# Patient Record
Sex: FEMALE | Race: WHITE | Employment: OTHER | URBAN - METROPOLITAN AREA
[De-identification: names, ages, dates, MRNs, and addresses within clinical notes are randomized per-mention and may not be internally consistent; named-entity substitution may affect disease eponyms.]

---

## 2017-08-17 ENCOUNTER — HOSPITAL ENCOUNTER (OUTPATIENT)
Dept: SURGERY | Age: 62
Discharge: HOME | End: 2017-08-17
Attending: INTERNAL MEDICINE | Admitting: INTERNAL MEDICINE

## 2017-08-17 VITALS
OXYGEN SATURATION: 96 % | WEIGHT: 180 LBS | SYSTOLIC BLOOD PRESSURE: 133 MMHG | TEMPERATURE: 98.2 F | DIASTOLIC BLOOD PRESSURE: 85 MMHG | RESPIRATION RATE: 16 BRPM | HEIGHT: 62 IN | BODY MASS INDEX: 33.13 KG/M2 | HEART RATE: 77 BPM

## 2017-08-17 DIAGNOSIS — Z12.11 ENCOUNTER FOR SCREENING FOR MALIGNANT NEOPLASM OF COLON: ICD-10-CM

## 2017-08-17 RX ORDER — LIDOCAINE HYDROCHLORIDE 10 MG/ML
0.1 INJECTION, SOLUTION EPIDURAL; INFILTRATION; INTRACAUDAL; PERINEURAL ONCE
Status: DISCONTINUED | OUTPATIENT
Start: 2017-08-17 | End: 2017-08-18 | Stop reason: HOSPADM

## 2017-08-17 RX ORDER — DULOXETIN HYDROCHLORIDE 30 MG/1
30 CAPSULE, DELAYED RELEASE ORAL DAILY
COMMUNITY
End: 2022-05-05 | Stop reason: SDUPTHER

## 2017-08-17 RX ORDER — SODIUM CHLORIDE, SODIUM LACTATE, POTASSIUM CHLORIDE, CALCIUM CHLORIDE 600; 310; 30; 20 MG/100ML; MG/100ML; MG/100ML; MG/100ML
INJECTION, SOLUTION INTRAVENOUS CONTINUOUS
Status: DISCONTINUED | OUTPATIENT
Start: 2017-08-17 | End: 2017-08-18 | Stop reason: HOSPADM

## 2017-08-17 RX ADMIN — SODIUM CHLORIDE, SODIUM LACTATE, POTASSIUM CHLORIDE, CALCIUM CHLORIDE: 600; 310; 30; 20 INJECTION, SOLUTION INTRAVENOUS at 07:17

## 2017-08-17 ASSESSMENT — PAIN SCALES - GENERAL
PAINLEVEL_OUTOF10: 0

## 2017-08-17 ASSESSMENT — PAIN - FUNCTIONAL ASSESSMENT: PAIN_FUNCTIONAL_ASSESSMENT: 0-10

## 2020-11-02 ENCOUNTER — HOSPITAL ENCOUNTER (OUTPATIENT)
Dept: MAMMOGRAPHY | Age: 65
Discharge: HOME | End: 2020-11-02
Payer: MEDICARE

## 2020-11-02 PROCEDURE — 77063 BREAST TOMOSYNTHESIS BI: CPT

## 2020-11-03 ENCOUNTER — TELEPHONE (OUTPATIENT)
Dept: MAMMOGRAPHY | Age: 65
End: 2020-11-03

## 2020-11-03 NOTE — TELEPHONE ENCOUNTER
Spoke with patient in regards to radiologist's recommendation for follow up to screening mammogram. tx to scheduling

## 2020-11-09 ENCOUNTER — HOSPITAL ENCOUNTER (OUTPATIENT)
Dept: MAMMOGRAPHY | Age: 65
Discharge: HOME | End: 2020-11-09
Payer: MEDICARE

## 2020-11-09 ENCOUNTER — HOSPITAL ENCOUNTER (OUTPATIENT)
Dept: ULTRASOUND IMAGING | Age: 65
Discharge: HOME | End: 2020-11-09
Payer: MEDICARE

## 2020-11-09 PROCEDURE — G0279 TOMOSYNTHESIS, MAMMO: HCPCS

## 2020-11-09 PROCEDURE — 76642 ULTRASOUND BREAST LIMITED: CPT

## 2021-07-15 ENCOUNTER — HOSPITAL ENCOUNTER (OUTPATIENT)
Dept: CT IMAGING | Age: 66
Discharge: HOME | End: 2021-07-15
Payer: MEDICARE

## 2021-07-15 DIAGNOSIS — M17.11 OSTEOARTHRITIS OF RIGHT KNEE, UNSPECIFIED OSTEOARTHRITIS TYPE: ICD-10-CM

## 2021-07-15 PROCEDURE — 73700 CT LOWER EXTREMITY W/O DYE: CPT

## 2021-07-27 ENCOUNTER — HOSPITAL ENCOUNTER (OUTPATIENT)
Age: 66
Discharge: HOME | End: 2021-07-27
Payer: MEDICARE

## 2021-07-27 LAB
ALBUMIN SERPL-MCNC: 4.5 G/DL (ref 3.4–5)
ANION GAP SERPL CALCULATED.3IONS-SCNC: 12 MMOL/L (ref 3–16)
APTT: 46.3 SEC (ref 26.2–38.6)
BASOPHILS ABSOLUTE: 0.1 K/UL (ref 0–0.2)
BASOPHILS RELATIVE PERCENT: 1.1 %
BILIRUBIN URINE: NEGATIVE
BLOOD, URINE: ABNORMAL
BUN BLDV-MCNC: 21 MG/DL (ref 7–20)
CALCIUM SERPL-MCNC: 9.2 MG/DL (ref 8.3–10.6)
CHLORIDE BLD-SCNC: 99 MMOL/L (ref 99–110)
CLARITY: CLEAR
CO2: 27 MMOL/L (ref 21–32)
COLOR: YELLOW
CREAT SERPL-MCNC: 0.7 MG/DL (ref 0.6–1.2)
EOSINOPHILS ABSOLUTE: 0.2 K/UL (ref 0–0.6)
EOSINOPHILS RELATIVE PERCENT: 2.6 %
EPITHELIAL CELLS, UA: NORMAL /HPF (ref 0–5)
GFR AFRICAN AMERICAN: >60
GFR NON-AFRICAN AMERICAN: >60
GLUCOSE BLD-MCNC: 94 MG/DL (ref 70–99)
GLUCOSE URINE: NEGATIVE MG/DL
HCT VFR BLD CALC: 40 % (ref 36–48)
HEMOGLOBIN: 13.3 G/DL (ref 12–16)
INR BLD: 1.03 (ref 0.88–1.12)
KETONES, URINE: NEGATIVE MG/DL
LEUKOCYTE ESTERASE, URINE: NEGATIVE
LYMPHOCYTES ABSOLUTE: 1.7 K/UL (ref 1–5.1)
LYMPHOCYTES RELATIVE PERCENT: 29.5 %
MCH RBC QN AUTO: 28.3 PG (ref 26–34)
MCHC RBC AUTO-ENTMCNC: 33.4 G/DL (ref 31–36)
MCV RBC AUTO: 84.8 FL (ref 80–100)
MICROSCOPIC EXAMINATION: YES
MONOCYTES ABSOLUTE: 0.5 K/UL (ref 0–1.3)
MONOCYTES RELATIVE PERCENT: 7.8 %
NEUTROPHILS ABSOLUTE: 3.4 K/UL (ref 1.7–7.7)
NEUTROPHILS RELATIVE PERCENT: 59 %
NITRITE, URINE: NEGATIVE
PDW BLD-RTO: 14.3 % (ref 12.4–15.4)
PH UA: 6.5 (ref 5–8)
PLATELET # BLD: 219 K/UL (ref 135–450)
PMV BLD AUTO: 8.5 FL (ref 5–10.5)
POTASSIUM SERPL-SCNC: 4 MMOL/L (ref 3.5–5.1)
PROTEIN UA: NEGATIVE MG/DL
PROTHROMBIN TIME: 11.7 SEC (ref 9.9–12.7)
RBC # BLD: 4.71 M/UL (ref 4–5.2)
RBC UA: NORMAL /HPF (ref 0–4)
SODIUM BLD-SCNC: 138 MMOL/L (ref 136–145)
SPECIFIC GRAVITY UA: 1.02 (ref 1–1.03)
TRANSFERRIN: 228 MG/DL (ref 200–360)
URINE TYPE: ABNORMAL
UROBILINOGEN, URINE: 0.2 E.U./DL
WBC # BLD: 5.8 K/UL (ref 4–11)
WBC UA: NORMAL /HPF (ref 0–5)

## 2021-07-27 PROCEDURE — 83036 HEMOGLOBIN GLYCOSYLATED A1C: CPT

## 2021-07-27 PROCEDURE — 80048 BASIC METABOLIC PNL TOTAL CA: CPT

## 2021-07-27 PROCEDURE — 81001 URINALYSIS AUTO W/SCOPE: CPT

## 2021-07-27 PROCEDURE — 85730 THROMBOPLASTIN TIME PARTIAL: CPT

## 2021-07-27 PROCEDURE — 36415 COLL VENOUS BLD VENIPUNCTURE: CPT

## 2021-07-27 PROCEDURE — 85610 PROTHROMBIN TIME: CPT

## 2021-07-27 PROCEDURE — 87086 URINE CULTURE/COLONY COUNT: CPT

## 2021-07-27 PROCEDURE — 82040 ASSAY OF SERUM ALBUMIN: CPT

## 2021-07-27 PROCEDURE — 84466 ASSAY OF TRANSFERRIN: CPT

## 2021-07-27 PROCEDURE — 85025 COMPLETE CBC W/AUTO DIFF WBC: CPT

## 2021-07-28 LAB
ESTIMATED AVERAGE GLUCOSE: 125.5 MG/DL
HBA1C MFR BLD: 6 %
URINE CULTURE, ROUTINE: NORMAL

## 2021-10-15 ENCOUNTER — CLINICAL DOCUMENTATION (OUTPATIENT)
Dept: OTHER | Age: 66
End: 2021-10-15

## 2021-12-16 ENCOUNTER — TELEPHONE (OUTPATIENT)
Dept: ORTHOPEDIC SURGERY | Age: 66
End: 2021-12-16

## 2021-12-16 NOTE — TELEPHONE ENCOUNTER
LVM for patient regarding the [address] [place] [address] Orthopedic joint pain program. Patient can call [phone] for more information or to schedule an appointment with a joint pain specialist.

## 2022-04-01 LAB
ALBUMIN SERPL-MCNC: 4.3 G/DL
ALP BLD-CCNC: 93 U/L
ALT SERPL-CCNC: 35 U/L
ANION GAP SERPL CALCULATED.3IONS-SCNC: NORMAL MMOL/L
AST SERPL-CCNC: 24 U/L
AVERAGE GLUCOSE: 131
BASOPHILS ABSOLUTE: 0.1 /ΜL
BASOPHILS RELATIVE PERCENT: 1 %
BILIRUB SERPL-MCNC: 0.3 MG/DL (ref 0.1–1.4)
BUN BLDV-MCNC: 16 MG/DL
CALCIUM SERPL-MCNC: 9.3 MG/DL
CHLORIDE BLD-SCNC: 99 MMOL/L
CHOLESTEROL, TOTAL: 164 MG/DL
CHOLESTEROL/HDL RATIO: NORMAL
CO2: 23 MMOL/L
CREAT SERPL-MCNC: 0.69 MG/DL
EOSINOPHILS ABSOLUTE: 0.1 /ΜL
EOSINOPHILS RELATIVE PERCENT: 2 %
GFR CALCULATED: 94
GLUCOSE BLD-MCNC: 99 MG/DL
HBA1C MFR BLD: 6.2 %
HCT VFR BLD CALC: 42.4 % (ref 36–46)
HDLC SERPL-MCNC: 55 MG/DL (ref 35–70)
HEMOGLOBIN: 13.9 G/DL (ref 12–16)
LDL CHOLESTEROL CALCULATED: 90 MG/DL (ref 0–160)
LYMPHOCYTES ABSOLUTE: 1.6 /ΜL
LYMPHOCYTES RELATIVE PERCENT: 27 %
MCH RBC QN AUTO: 27.9 PG
MCHC RBC AUTO-ENTMCNC: 32.8 G/DL
MCV RBC AUTO: 85 FL
MONOCYTES ABSOLUTE: 0.4 /ΜL
MONOCYTES RELATIVE PERCENT: 7 %
NEUTROPHILS ABSOLUTE: 3.6 /ΜL
NEUTROPHILS RELATIVE PERCENT: 63 %
NONHDLC SERPL-MCNC: NORMAL MG/DL
PLATELET # BLD: 234 K/ΜL
PMV BLD AUTO: NORMAL FL
POTASSIUM SERPL-SCNC: 4.3 MMOL/L
RBC # BLD: 4.98 10^6/ΜL
SODIUM BLD-SCNC: 139 MMOL/L
TOTAL PROTEIN: 7
TRIGL SERPL-MCNC: 106 MG/DL
TSH SERPL DL<=0.05 MIU/L-ACNC: 2.9 UIU/ML
VLDLC SERPL CALC-MCNC: 19 MG/DL
WBC # BLD: 5.8 10^3/ML

## 2022-05-05 ENCOUNTER — OFFICE VISIT (OUTPATIENT)
Dept: FAMILY MEDICINE CLINIC | Age: 67
End: 2022-05-05
Payer: MEDICARE

## 2022-05-05 VITALS
BODY MASS INDEX: 39.93 KG/M2 | HEART RATE: 115 BPM | DIASTOLIC BLOOD PRESSURE: 82 MMHG | SYSTOLIC BLOOD PRESSURE: 126 MMHG | OXYGEN SATURATION: 97 % | HEIGHT: 62 IN | TEMPERATURE: 98 F | WEIGHT: 217 LBS

## 2022-05-05 DIAGNOSIS — M79.7 FIBROMYALGIA: ICD-10-CM

## 2022-05-05 DIAGNOSIS — Z78.0 POSTMENOPAUSAL: ICD-10-CM

## 2022-05-05 DIAGNOSIS — N81.10 BLADDER PROLAPSE, FEMALE, ACQUIRED: ICD-10-CM

## 2022-05-05 DIAGNOSIS — I10 ESSENTIAL HYPERTENSION: ICD-10-CM

## 2022-05-05 DIAGNOSIS — R22.43 LOCALIZED SWELLING OF BOTH LOWER LEGS: ICD-10-CM

## 2022-05-05 DIAGNOSIS — Z53.20 SCREENING MAMMOGRAPHY DECLINED: ICD-10-CM

## 2022-05-05 DIAGNOSIS — F33.1 MODERATE EPISODE OF RECURRENT MAJOR DEPRESSIVE DISORDER (HCC): ICD-10-CM

## 2022-05-05 DIAGNOSIS — Z76.89 ENCOUNTER TO ESTABLISH CARE: Primary | ICD-10-CM

## 2022-05-05 LAB
ALBUMIN SERPL-MCNC: 4.7 G/DL (ref 3.4–5)
ANION GAP SERPL CALCULATED.3IONS-SCNC: 18 MMOL/L (ref 3–16)
BUN BLDV-MCNC: 18 MG/DL (ref 7–20)
CALCIUM SERPL-MCNC: 9.7 MG/DL (ref 8.3–10.6)
CHLORIDE BLD-SCNC: 101 MMOL/L (ref 99–110)
CO2: 24 MMOL/L (ref 21–32)
CREAT SERPL-MCNC: 0.8 MG/DL (ref 0.6–1.2)
GFR AFRICAN AMERICAN: >60
GFR NON-AFRICAN AMERICAN: >60
GLUCOSE BLD-MCNC: 93 MG/DL (ref 70–99)
PHOSPHORUS: 3.2 MG/DL (ref 2.5–4.9)
POTASSIUM SERPL-SCNC: 4.2 MMOL/L (ref 3.5–5.1)
SODIUM BLD-SCNC: 143 MMOL/L (ref 136–145)

## 2022-05-05 PROCEDURE — 1036F TOBACCO NON-USER: CPT | Performed by: PHYSICIAN ASSISTANT

## 2022-05-05 PROCEDURE — G8400 PT W/DXA NO RESULTS DOC: HCPCS | Performed by: PHYSICIAN ASSISTANT

## 2022-05-05 PROCEDURE — 1123F ACP DISCUSS/DSCN MKR DOCD: CPT | Performed by: PHYSICIAN ASSISTANT

## 2022-05-05 PROCEDURE — G8417 CALC BMI ABV UP PARAM F/U: HCPCS | Performed by: PHYSICIAN ASSISTANT

## 2022-05-05 PROCEDURE — 99214 OFFICE O/P EST MOD 30 MIN: CPT | Performed by: PHYSICIAN ASSISTANT

## 2022-05-05 PROCEDURE — 3017F COLORECTAL CA SCREEN DOC REV: CPT | Performed by: PHYSICIAN ASSISTANT

## 2022-05-05 PROCEDURE — 4040F PNEUMOC VAC/ADMIN/RCVD: CPT | Performed by: PHYSICIAN ASSISTANT

## 2022-05-05 PROCEDURE — G8427 DOCREV CUR MEDS BY ELIG CLIN: HCPCS | Performed by: PHYSICIAN ASSISTANT

## 2022-05-05 PROCEDURE — 1090F PRES/ABSN URINE INCON ASSESS: CPT | Performed by: PHYSICIAN ASSISTANT

## 2022-05-05 RX ORDER — FUROSEMIDE 40 MG/1
TABLET ORAL
COMMUNITY
Start: 2022-04-28 | End: 2022-05-20 | Stop reason: SDUPTHER

## 2022-05-05 RX ORDER — DULOXETIN HYDROCHLORIDE 60 MG/1
60 CAPSULE, DELAYED RELEASE ORAL DAILY
Qty: 90 CAPSULE | Refills: 1 | Status: SHIPPED | OUTPATIENT
Start: 2022-05-05

## 2022-05-05 RX ORDER — DULOXETIN HYDROCHLORIDE 30 MG/1
30 CAPSULE, DELAYED RELEASE ORAL DAILY
Qty: 7 CAPSULE | Refills: 0 | Status: SHIPPED | OUTPATIENT
Start: 2022-05-05 | End: 2022-05-20

## 2022-05-05 SDOH — ECONOMIC STABILITY: TRANSPORTATION INSECURITY
IN THE PAST 12 MONTHS, HAS THE LACK OF TRANSPORTATION KEPT YOU FROM MEDICAL APPOINTMENTS OR FROM GETTING MEDICATIONS?: NO

## 2022-05-05 SDOH — ECONOMIC STABILITY: TRANSPORTATION INSECURITY
IN THE PAST 12 MONTHS, HAS LACK OF TRANSPORTATION KEPT YOU FROM MEETINGS, WORK, OR FROM GETTING THINGS NEEDED FOR DAILY LIVING?: NO

## 2022-05-05 SDOH — ECONOMIC STABILITY: HOUSING INSECURITY: IN THE LAST 12 MONTHS, HOW MANY PLACES HAVE YOU LIVED?: 1

## 2022-05-05 SDOH — ECONOMIC STABILITY: INCOME INSECURITY: IN THE LAST 12 MONTHS, WAS THERE A TIME WHEN YOU WERE NOT ABLE TO PAY THE MORTGAGE OR RENT ON TIME?: NO

## 2022-05-05 SDOH — ECONOMIC STABILITY: FOOD INSECURITY: WITHIN THE PAST 12 MONTHS, YOU WORRIED THAT YOUR FOOD WOULD RUN OUT BEFORE YOU GOT MONEY TO BUY MORE.: NEVER TRUE

## 2022-05-05 SDOH — ECONOMIC STABILITY: HOUSING INSECURITY
IN THE LAST 12 MONTHS, WAS THERE A TIME WHEN YOU DID NOT HAVE A STEADY PLACE TO SLEEP OR SLEPT IN A SHELTER (INCLUDING NOW)?: NO

## 2022-05-05 SDOH — ECONOMIC STABILITY: FOOD INSECURITY: WITHIN THE PAST 12 MONTHS, THE FOOD YOU BOUGHT JUST DIDN'T LAST AND YOU DIDN'T HAVE MONEY TO GET MORE.: NEVER TRUE

## 2022-05-05 ASSESSMENT — PATIENT HEALTH QUESTIONNAIRE - PHQ9
SUM OF ALL RESPONSES TO PHQ QUESTIONS 1-9: 0
2. FEELING DOWN, DEPRESSED OR HOPELESS: 0
SUM OF ALL RESPONSES TO PHQ QUESTIONS 1-9: 0
SUM OF ALL RESPONSES TO PHQ9 QUESTIONS 1 & 2: 0
1. LITTLE INTEREST OR PLEASURE IN DOING THINGS: 0

## 2022-05-05 ASSESSMENT — ENCOUNTER SYMPTOMS
CONSTIPATION: 0
NAUSEA: 0
SORE THROAT: 0
ABDOMINAL PAIN: 1
SHORTNESS OF BREATH: 0
COUGH: 0
DIARRHEA: 0
RHINORRHEA: 0
VOMITING: 0

## 2022-05-05 ASSESSMENT — SOCIAL DETERMINANTS OF HEALTH (SDOH): HOW HARD IS IT FOR YOU TO PAY FOR THE VERY BASICS LIKE FOOD, HOUSING, MEDICAL CARE, AND HEATING?: HARD

## 2022-05-05 NOTE — PROGRESS NOTES
2022  Agustin Conrad (: 1955)  74 y.o. HPI     Patient presents to establish care- switching pcp for personal reasons. Fibromyalgia- previously on cymbalta for \"whole body pain. \" was told in the past it was fibro. Stopped the medication a few months ago and has noticed gradual worsening of body aches and pains. Mostly in arms torso, and legs. Palpitations: stable on metoprolol. Reports she has an upcoming appointment with cardiology for ongoing chest pain. BP is normal in office today. Denies ha, soa, orthopnea and shen. Anxiety and depression: previously on cymbalta, felt more even keeled. Worsening sleep over the last year, difficulty falling and staying asleep. Currently takes lasix for swelling in the lower extremities. Takes 40 mg daily - has been on this medication 2 months- has not had repeat blood work since starting. Follows with Dr. Jadyn Cerna for chronic abdominal pain and GERD. Currently  on esomeprazole- not working as well as previously. Some increased abdominal pain from baseline. Denies odynophagia and dysphagia. Has prolapsed bladder requesting referral for repair. Has had associated incontinence. Due for mammo- declines states \"I will never do that again. \" she is aware of risks associated with foregoing screening including undiagnosed breast cancer. Review of Systems   Constitutional: Negative for activity change, chills and fever. HENT: Negative for congestion, ear pain, rhinorrhea and sore throat. Eyes: Negative for visual disturbance. Respiratory: Negative for cough and shortness of breath. Cardiovascular: Positive for chest pain. Negative for palpitations. Gastrointestinal: Positive for abdominal pain. Negative for constipation, diarrhea, nausea and vomiting. Genitourinary: Positive for pelvic pain. Negative for difficulty urinating and dysuria. Bladder prolapse   Musculoskeletal: Positive for arthralgias and myalgias.    Skin: Negative for rash. Neurological: Positive for numbness (bilateral feet). Negative for dizziness and weakness. Psychiatric/Behavioral: Negative for sleep disturbance. Past Medical History:   Diagnosis Date    Colitis     Diverticulosis     MVP (mitral valve prolapse)     Spastic colon      Past Surgical History:   Procedure Laterality Date    APPENDECTOMY      BREAST ENHANCEMENT SURGERY  1973    x3- all breast tissue removed then implants    CARPAL TUNNEL RELEASE      COLONOSCOPY  08/17/2017    polyps    HYSTERECTOMY      INNER EAR SURGERY      JOINT REPLACEMENT Right     Knee    TONSILLECTOMY      UPPER GASTROINTESTINAL ENDOSCOPY  08/17/2017     Family History   Problem Relation Age of Onset    Heart Disease Mother     High Blood Pressure Mother     Heart Disease Father     High Blood Pressure Father     Cancer Sister     Heart Disease Maternal Grandfather      Social History     Socioeconomic History    Marital status: Single     Spouse name: Not on file    Number of children: Not on file    Years of education: Not on file    Highest education level: Not on file   Occupational History    Not on file   Tobacco Use    Smoking status: Former Smoker    Smokeless tobacco: Never Used   Vaping Use    Vaping Use: Some days   Substance and Sexual Activity    Alcohol use: No    Drug use: No    Sexual activity: Never   Other Topics Concern    Not on file   Social History Narrative    Not on file     Social Determinants of Health     Financial Resource Strain: High Risk    Difficulty of Paying Living Expenses: Hard   Food Insecurity: No Food Insecurity    Worried About Running Out of Food in the Last Year: Never true    [place] of Food in the Last Year: Never true   Transportation Needs: No Transportation Needs    Lack of Transportation (Medical): No    Lack of Transportation (Non-Medical):  No   Physical Activity:     Days of Exercise per Week: Not on file    Minutes of Exercise per Session: Not on file   Stress:     Feeling of Stress : Not on file   Social Connections:     Frequency of Communication with Friends and Family: Not on file    Frequency of Social Gatherings with Friends and Family: Not on file    Attends Latter day Services: Not on file    Active Member of [address] [place] [address] or Organizations: Not on file    Attends Club or Organization Meetings: Not on file    Marital Status: Not on file   Intimate Partner Violence:     Fear of Current or Ex-Partner: Not on file    Emotionally Abused: Not on file    Physically Abused: Not on file    Sexually Abused: Not on file   Housing Stability: [address] Unable to Pay for Housing in the Last Year: No    Number of [place] in the Last Year: 1    Unstable Housing in the Last Year: No     Allergies   Allergen Reactions    Sertraline Other (See Comments)     Suicidal ideation requiring hospitalization    Sulfa Antibiotics Nausea Only and Other (See Comments)     dizziness       Current Outpatient Medications   Medication Sig Dispense Refill    furosemide (LASIX) 40 MG tablet TAKE 1 TABLET BY MOUTH ONCE DAILY      DULoxetine (CYMBALTA) 30 MG extended release capsule Take 1 capsule by mouth daily 7 capsule 0    DULoxetine (CYMBALTA) 60 MG extended release capsule Take 1 capsule by mouth daily 90 capsule 1    Esomeprazole Magnesium (NEXIUM PO) Take by mouth daily as needed       No current facility-administered medications for this visit. Vitals:    05/05/22 0937   BP: 126/82   Site: Right Upper Arm   Position: Sitting   Cuff Size: Large Adult   Pulse: 115   Temp: 98 °F (36.7 °C)   TempSrc: Oral   SpO2: 97%   Weight: 217 lb (98.4 kg)   Height: 5' 1.5\" (1.562 m)     Estimated body mass index is 40.34 kg/m² as calculated from the following:    Height as of this encounter: 5' 1.5\" (1.562 m). Weight as of this encounter: 217 lb (98.4 kg). Physical Exam  Constitutional:       General: She is not in acute distress. Appearance: She is well-developed. HENT:      Head: Normocephalic and atraumatic. Eyes:      Conjunctiva/sclera: Conjunctivae normal.      Pupils: Pupils are equal, round, and reactive to light. Cardiovascular:      Rate and Rhythm: Normal rate and regular rhythm. Heart sounds: Normal heart sounds. No murmur heard. Pulmonary:      Effort: Pulmonary effort is normal.      Breath sounds: Normal breath sounds. No wheezing. Abdominal:      General: Bowel sounds are normal.      Palpations: Abdomen is soft. Tenderness: There is no abdominal tenderness. Musculoskeletal:      Cervical back: Neck supple. Lymphadenopathy:      Cervical: No cervical adenopathy. Skin:     General: Skin is warm and dry. Findings: No rash. Neurological:      Mental Status: She is alert and oriented to person, place, and time. ASSESSMENT and PLAN:  Army Mckeon was seen today for new patient. Diagnoses and all orders for this visit:    Encounter to establish care  - declines mammo  - utd on colonoscopy   - agreeable to dexa   - reports just had labs done, will request.     Essential hypertension  - well controlled on current regimen, continue     Fibromyalgia  -     DULoxetine (CYMBALTA) 30 MG extended release capsule; Take 1 capsule by mouth daily  -     DULoxetine (CYMBALTA) 60 MG extended release capsule; Take 1 capsule by mouth daily  - restart cymbalta, previously maintained on 60 mg.     Bladder prolapse, female, acquired  -     External Referral To Gynecology    Localized swelling of both lower legs  -     Renal Function Panel; Future  - on lasix- due for renal function recheck. Not on K supplement     Postmenopausal  -     DEXA BONE DENSITY AXIAL SKELETON; Future    Screening mammography declined  - aware of risks associated with this decisions     Moderate episode of recurrent major depressive disorder (HCC)  -     DULoxetine (CYMBALTA) 30 MG extended release capsule;  Take 1 capsule by mouth daily  -     DULoxetine (CYMBALTA) 60 MG extended release capsule; Take 1 capsule by mouth daily      Return in about 4 weeks (around 6/2/2022) for Anxiety, Depression.

## 2022-05-19 NOTE — PROGRESS NOTES
that she does not drink alcohol and does not use drugs. She lives in [facility], she is not . She had 1 daughter. Quit smoking in . Smoked 1 pack a day. Denies alcohol and drugs    Family History:  family history includes Cancer in her sister; Heart Disease in her father, maternal grandfather, and mother; High Blood Pressure in her father and mother. dad  at 80 of heart attack. Mom had heart problem that went undiagnosed and  from that. Home Medications:  Prior to Admission medications    Medication Sig Start Date End Date Taking? Authorizing Provider   furosemide (LASIX) 40 MG tablet TAKE 1 TABLET BY MOUTH ONCE DAILY 22  Yes Historical Provider, MD   DULoxetine (CYMBALTA) 60 MG extended release capsule Take 1 capsule by mouth daily 22  Yes RENARD Lackey   Esomeprazole Magnesium (NEXIUM PO) Take 20 mg by mouth daily    Yes Historical Provider, MD   DULoxetine (CYMBALTA) 30 MG extended release capsule Take 1 capsule by mouth daily  Patient not taking: Reported on 2022   RENARD Lackey        Allergies:  Sertraline and Sulfa antibiotics     Review of Systems:   · Constitutional: there has been no unanticipated weight loss. There's been no change in energy level, sleep pattern, or activity level. · Eyes: No visual changes or diplopia. No scleral icterus. · ENT: No Headaches, hearing loss or vertigo. No mouth sores or sore throat. · Cardiovascular: Reviewed in HPI  · Respiratory: No cough or wheezing, no sputum production. No hematemesis. · Gastrointestinal: No abdominal pain, appetite loss, blood in stools. No change in bowel or bladder habits. · Genitourinary: No dysuria, trouble voiding, or hematuria. · Musculoskeletal:  No gait disturbance, weakness or joint complaints. · Integumentary: No rash or pruritis. · Neurological: No headache, diplopia, change in muscle strength, numbness or tingling.  No change in gait, balance, coordination, mood, affect, memory, mentation, behavior. · Psychiatric: No anxiety, no depression. · Endocrine: No malaise, fatigue or temperature intolerance. No excessive thirst, fluid intake, or urination. No tremor. · Hematologic/Lymphatic: No abnormal bruising or bleeding, blood clots or swollen lymph nodes. · Allergic/Immunologic: No nasal congestion or hives. Physical Examination:    Vitals:    05/20/22 1319   BP: 124/80   Pulse: 104   Temp: 98.3 °F (36.8 °C)   SpO2: 94%        Constitutional and General Appearance: NAD   Respiratory:  · Normal excursion and expansion without use of accessory muscles  · Resp Auscultation: Clear, no crackles or wheezes   Cardiovascular:  · The apical impulses not displaced  · Heart tones are crisp and normal  · Cervical veins are not engorged  · The carotid upstroke is normal in amplitude and contour without delay or bruit  · Normal S1S2, No S3, No Murmur  · Peripheral pulses are symmetrical and full  · There is no clubbing, cyanosis of the extremities. · Trace - 1+ BLE edema  · Femoral Arteries: 2+ and equal  · Pedal Pulses: 2+ and equal   Abdomen:  · No masses or tenderness  · Liver/Spleen: No Abnormalities Noted  Neurological/Psychiatric:  · Alert and oriented in all spheres  · Moves all extremities well  · Exhibits normal gait balance and coordination  · No abnormalities of mood, affect, memory, mentation, or behavior are noted  Skin:  · Skin: warm and dry.     Lab Results   Component Value Date    CHOL 164 04/01/2022    CHOL 183 08/23/2016     Lab Results   Component Value Date    TRIG 106 04/01/2022    TRIG 120 08/23/2016     Lab Results   Component Value Date    HDL 55 04/01/2022    HDL 63 (H) 08/23/2016     Lab Results   Component Value Date    LDLCALC 90 04/01/2022    LDLCALC 96 08/23/2016     Lab Results   Component Value Date    LABVLDL 24 08/23/2016    VLDL 19 04/01/2022     No results found for: FAIZAN    I have personally reviewed all labs including lipids 4/1/22    Assessment:     1. Essential hypertension: Well controlled and will continue current medical regimen. 2. Abnormal EKG:  Most recent EKG 3/31/22 SR 97 bpm; NST change. I do not see concerning findings on this study. 3. SOB (shortness of breath) on exertion: Need concern for CHF +/- cardiomyopathy vs underlying lung dz vs MIRIAN vs obesity/deconditioning vs combination. Will start with ECHO to evaluate EF and pro-BNP level. Need referral to pulm doc for possible MIRIAN and obstructive lung disease due to tob abuse history. 4. LE edema: See #3 above. Possible CHF vs venous insuffiency. Improved with lasix and will continue 40mg daily. 5. Tachycardia: Mildly tachycardic on exam today. Will perform SYED to evaluate avg HR and rhythm. ECHO to see if any evidence for cardiomyopathy. Consider beta blocker based on testing results. Plan:  1. Your heart rate is 104 today. I call that mild tachycardia (faster HR). 2. Your EKG looks normal to me. 3. I would like for you to wear a monitor for 48 hours so we can see what is happening with your heart.   -if your heart rate is steady around 100 bpm then I won't need to start a medication   -if your heart rate is consistently above 110 bpm, then I can start you on a beta blocker medication to help control your heart rate  4. Call to schedule an Echocardiogram to look at heart size and strength   - This test is an ultrasound of your heart just like when you see an ultrasound that a woman has when she is pregnant.  -The test records the movement of your heart valves and chambers.  -It evaluates heart valves, chamber enlargement, abnormal openings, or any fluid in the sac surrounding the heart. 5. I will personally review your tests and then I will have my staff call you with the results. 6. Continue taking your lasix as prescribed   7. I will personally review your tests and then I will have my staff call you with the results.    8. I would like for you to see a lung doctor to evaluate you for sleep apnea and lung function. Follow up with me in 3 months    This note is scribed in the presence of Dr. Juan Gutierrez. Cheryl Duffy by Maty Lema RN.    I, Dr. Greg Fay, personally performed the services described in this documentation, as scribed by the above signed scribe in my presence. It is both accurate and complete to my knowledge. I agree with the details independently gathered by the clinical support staff, while the remaining scribed note accurately describes my personal service to the patient. Cost of prescription medications and patient compliance have been reviewed with patient. All questions answered. Thank you for allowing me to participate in the care of this individual.    Juan Gutierrez.  Cheryl Duffy M.D., [facility]

## 2022-05-20 ENCOUNTER — OFFICE VISIT (OUTPATIENT)
Dept: CARDIOLOGY CLINIC | Age: 67
End: 2022-05-20
Payer: MEDICARE

## 2022-05-20 ENCOUNTER — TELEPHONE (OUTPATIENT)
Dept: CARDIOLOGY CLINIC | Age: 67
End: 2022-05-20

## 2022-05-20 ENCOUNTER — HOSPITAL ENCOUNTER (OUTPATIENT)
Age: 67
Discharge: HOME | End: 2022-05-20
Payer: MEDICARE

## 2022-05-20 VITALS
DIASTOLIC BLOOD PRESSURE: 80 MMHG | HEART RATE: 104 BPM | BODY MASS INDEX: 39.56 KG/M2 | HEIGHT: 62 IN | WEIGHT: 215 LBS | OXYGEN SATURATION: 94 % | SYSTOLIC BLOOD PRESSURE: 124 MMHG | TEMPERATURE: 98.3 F

## 2022-05-20 DIAGNOSIS — R60.0 LOCALIZED EDEMA: ICD-10-CM

## 2022-05-20 DIAGNOSIS — Z87.891 HISTORY OF TOBACCO ABUSE: ICD-10-CM

## 2022-05-20 DIAGNOSIS — R06.02 SOB (SHORTNESS OF BREATH) ON EXERTION: ICD-10-CM

## 2022-05-20 DIAGNOSIS — R94.31 ABNORMAL EKG: ICD-10-CM

## 2022-05-20 DIAGNOSIS — R00.0 TACHYCARDIA: ICD-10-CM

## 2022-05-20 DIAGNOSIS — I10 ESSENTIAL HYPERTENSION: Primary | ICD-10-CM

## 2022-05-20 PROCEDURE — 83880 ASSAY OF NATRIURETIC PEPTIDE: CPT

## 2022-05-20 PROCEDURE — 99204 OFFICE O/P NEW MOD 45 MIN: CPT | Performed by: INTERNAL MEDICINE

## 2022-05-20 PROCEDURE — 36415 COLL VENOUS BLD VENIPUNCTURE: CPT

## 2022-05-20 PROCEDURE — 93225 XTRNL ECG REC<48 HRS REC: CPT | Performed by: INTERNAL MEDICINE

## 2022-05-20 PROCEDURE — G8427 DOCREV CUR MEDS BY ELIG CLIN: HCPCS | Performed by: INTERNAL MEDICINE

## 2022-05-20 PROCEDURE — G8417 CALC BMI ABV UP PARAM F/U: HCPCS | Performed by: INTERNAL MEDICINE

## 2022-05-20 PROCEDURE — 1090F PRES/ABSN URINE INCON ASSESS: CPT | Performed by: INTERNAL MEDICINE

## 2022-05-20 RX ORDER — FUROSEMIDE 40 MG/1
TABLET ORAL
Qty: 90 TABLET | Refills: 3 | Status: SHIPPED | OUTPATIENT
Start: 2022-05-20

## 2022-05-20 NOTE — PATIENT INSTRUCTIONS
Plan:  1. Your heart rate is 104 today. I call that tachycardia  2. Your EKG looks normal to me. 3. I would like for you to wear a monitor for 48 hours so we can see what is happening with your heart.              -if your heart rate is steady around 100 bpm then I won't need to start a medication              -if your heart rate is consistently above 110 bpm, then I can start you on a beta blocker medication to help control your heart rate  4. Call to schedule an Echocardiogram to look at heart size and strength   - This test is an ultrasound of your heart just like when you see an ultrasound that a woman has when she is pregnant.  -The test records the movement of your heart valves and chambers.  -It evaluates heart valves, chamber enlargement, abnormal openings, or any fluid in the sac surrounding the heart. 5. I will personally review your tests and then I will have my staff call you with the results. 6. Continue taking your lasix as prescribed   7. I will personally review your tests and then I will have my staff call you with the results.    8. I would like for you to see a lung doctor to evaluate you for sleep apnea and lung function.     Follow up with me in 3 months

## 2022-05-20 NOTE — TELEPHONE ENCOUNTER
Monitor placed by [employer]  Monitor company [employer]  Length of monitor 48 hours  Monitor ordered by [facility]  Serial number [id number]  Activation successful prior to pt leaving office?  YES

## 2022-05-21 LAB — PRO-BNP: 32 PG/ML (ref 0–124)

## 2022-06-02 PROCEDURE — 93227 XTRNL ECG REC<48 HR R&I: CPT | Performed by: INTERNAL MEDICINE

## 2022-06-14 DIAGNOSIS — R00.2 PALPITATION: ICD-10-CM

## 2022-06-15 ENCOUNTER — HOSPITAL ENCOUNTER (OUTPATIENT)
Dept: NON INVASIVE DIAGNOSTICS | Age: 67
Discharge: HOME | End: 2022-06-15
Payer: MEDICARE

## 2022-06-15 DIAGNOSIS — R60.0 LOCALIZED EDEMA: ICD-10-CM

## 2022-06-15 DIAGNOSIS — R06.02 SOB (SHORTNESS OF BREATH) ON EXERTION: ICD-10-CM

## 2022-06-15 LAB
LV EF: 65 %
LVEF MODALITY: NORMAL

## 2022-06-15 PROCEDURE — 93306 TTE W/DOPPLER COMPLETE: CPT

## 2022-06-16 ENCOUNTER — TELEPHONE (OUTPATIENT)
Dept: CARDIOLOGY CLINIC | Age: 67
End: 2022-06-16

## 2022-06-16 NOTE — TELEPHONE ENCOUNTER
----- Message from Susie Olivares MD sent at 6/15/2022  6:22 PM EDT -----  ECHO shows good heart strength. No concerning findings. Good news.  cpm

## 2022-06-16 NOTE — TELEPHONE ENCOUNTER
----- Message from Jessica Carrera MD sent at 6/14/2022  1:54 PM EDT -----  Monitor good news. Normal rhythm with avg HR < 100bpm. No concerning findings. No need to do anything at this time. Await ECHO results.

## 2022-08-18 ENCOUNTER — TELEMEDICINE (OUTPATIENT)
Dept: PULMONOLOGY | Age: 67
End: 2022-08-18
Payer: MEDICARE

## 2022-08-18 ENCOUNTER — TELEPHONE (OUTPATIENT)
Dept: PULMONOLOGY | Age: 67
End: 2022-08-18

## 2022-08-18 DIAGNOSIS — J84.9 ILD (INTERSTITIAL LUNG DISEASE) (HCC): Primary | ICD-10-CM

## 2022-08-18 PROCEDURE — 99442 PR PHYS/QHP TELEPHONE EVALUATION 11-20 MIN: CPT | Performed by: INTERNAL MEDICINE

## 2022-08-18 RX ORDER — UREA 10 %
500 LOTION (ML) TOPICAL DAILY PRN
COMMUNITY

## 2022-08-18 NOTE — TELEPHONE ENCOUNTER
Within this Telehealth Consent, the terms you and yours refer to the person using the Telehealth Service (Service), or in the case of a use of the Service by or on behalf of a minor, you and yours refer to and include (i) the parent or legal guardian who provides consent to the use of the Service by such minor or uses the Service on behalf of such minor, and (ii) the minor for whom consent is being provided or on whose behalf the Service is being utilized. When using Service, you will be consulting with your health care providers via the use of Telehealth.   Telehealth involves the delivery of healthcare services using electronic communications, information technology or other means between a healthcare provider and a patient who are not in the same physical location. Telehealth may be used for diagnosis, treatment, follow-up and/or patient education, and may include, but is not limited to, one or more of the following:   Electronic transmission of medical records, photo images, personal health information or other data between a patient and a healthcare provider   Interactions between a patient and healthcare provider via audio, video and/or data communications   Use of output data from medical devices, sound and video files    Anticipated Benefits   The use of Telehealth by your Provider(s) through the Service may have the following possible benefits:   Making it easier and more efficient for you to access medical care and treatment for the conditions treated by such Provider(s) utilizing the Service   Allowing you to obtain medical care and treatment by Provider(s) at times that are convenient for you   Enabling you to interact with Provider(s) without the necessity of an in-office appointment     Possible Risks   While the use of Telehealth can provide potential benefits for you, there are also potential risks associated with the use of Telehealth.  These risks include, but may not be limited to the following: Your Provider(s) may not able to provide medical treatment for your particular condition and you may be required to seek alternative healthcare or emergency care services. The electronic systems or other security protocols or safeguards used in the Service could fail, causing a breach of privacy of your medical or other information. Given regulatory requirements in certain jurisdictions, your Provider(s) diagnosis and/or treatment options, especially pertaining to certain prescriptions, may be limited. Acceptance   You understand that Services will be provided via Telehealth. This process involves the use of HIPAA compliant and secure, real-time audio-visual interfacing with a qualified and appropriately trained provider located at [facility]. You understand that, under no circumstances, will this session be recorded. You understand that the Provider(s) at [facility] and other clinical participants will be party to the information obtained during the Telehealth session in accordance with best medical practices. You understand that the information obtained during the Telehealth session will be used to help determine the most appropriate treatment options. You understand that You have the right to revoke this consent at any point in time. You understand that Telehealth is voluntary, and that continued treatment is not dependent upon consent. You understand that, in the event of non-consent to Telehealth services and/or technical difficulties, you will obtain services as typically provided in the absence of Telehealth technology. You understand that this consent will be kept in Your medical record. No potential benefits from the use of Telehealth or specific results can be guaranteed. Your condition may not be cured or improved and, in some cases, may get worse.    There are limitations in the provision of medical care and treatment via Telehealth and the Service and you may not be able to receive diagnosis and/or treatment through the Service for every condition for which you seek diagnosis and/or treatment. There are potential risks to the use of Telehealth, including but not limited to the risks described in this Telehealth Consent. Your Provider(s) have discussed the use of Telehealth and the Service with you, including the benefits and risks of such and you have provided oral consent to your Provider(s) for the use of Telehealth and the Service. You understand that it is your duty to provide your Provider(s) truthful, accurate and complete information, including all relevant information regarding care that you may have received or may be receiving from other healthcare providers outside of the Service. You understand that each of your Provider(s) may determine in his or sole discretion that your condition is not suitable for diagnosis and/or treatment using the Service, and that you may need to seek medical care and treatment a specialist or other healthcare provider, outside of the Service. You understand that you are fully responsible for payment for all services provided by Provider(s) or through use of the Service and that you may not be able to use third-party insurance. You represent that (a) you have read this Telehealth Consent carefully, (b) you understand the risks and benefits of the Service and the use of Telehealth in the medical care and treatment provided to you by Provider(s) using the Service, and (c) you have the legal capacity and authority to provide this consent for yourself and/or the minor for which you are consenting under applicable federal and state laws, including laws relating to the age of [de-identified] and/or parental/guardian consent. You give your informed consent to the use of Telehealth by Provider(s) using the Service under the terms described in the Terms of Service and this Telehealth Consent. The patient was read the following statement and has consented to the visit as of 8/18/22. The patient has been scheduled for their first telehealth visit on 08/18/22 with Dr. Luis Felipe Granado.

## 2022-08-18 NOTE — PROGRESS NOTES
P  [facility]                                               Pulmonary Clinic Consult     I had the pleasure of seeing  Agustina Rebollar     Chief Complaint   Patient presents with    New Patient     R/b Dr. Isaac Zelaya cardiology     Shortness of Breath       HISTORY OF PRESENT ILLNESS:    Agustina Rebollar is a 74y.o. year old  Who  has 30 PPY smoking history     She  quit smoking 10 years ,still vape once in while     The Patient comes in with SOB that has been going on the last 10  years  Associated with cough ,however it got worse with ambulation ,he  states that it get worse with exercise or walking long distance and he can walk . 1 mile .  And go 1 flight of stairs before get short winded    He/She  has cough ,productive for   Sputum and it is more in the   Had Covid 3 times   She had stress and   She has sleep apnea she states there is no way to put cpap             ALLERGIES:    Allergies   Allergen Reactions    Omeprazole Diarrhea    Sertraline Other (See Comments)     Suicidal ideation requiring hospitalization  Suicidal ideation requiring hospitalization  Suicidal ideation requiring hospitalization    Sulfa Antibiotics Nausea Only and Other (See Comments)     dizziness    Propofol Nausea And Vomiting     Pt notes along with nausea and vomiting she had difficulty waking up after surgery       PAST MEDICAL HISTORY:       Diagnosis Date    Colitis     Diverticulosis     MVP (mitral valve prolapse)     Spastic colon        MEDICATIONS:   Current Outpatient Medications   Medication Sig Dispense Refill    furosemide (LASIX) 40 MG tablet TAKE 1 TABLET BY MOUTH ONCE DAILY 90 tablet 3    DULoxetine (CYMBALTA) 60 MG extended release capsule Take 1 capsule by mouth daily 90 capsule 1    Esomeprazole Magnesium (NEXIUM PO) Take 20 mg by mouth daily       calcium carbonate (OS-SOHA) 1250 (500 Ca) MG chewable tablet Take 500 mg by mouth daily as needed       No current facility-administered medications for this visit. 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 There has been no weight changes. No fevers, fatigue or weakness. Head: Patient denies any history of trauma, convulsive disorder or syncope. Skin:  Patient denies history of changes in pigmentation, eruptions or pruritus. No easy bruising or bleeding. EENT: no nasal congestion   Cardiovascular ,No chest pain ,No edema ,  Respiration:SOB: + ,VILLELA :+  Gastrointestinal:No GI bleed ,no melena  ,no hematemesis    Musculoskeletal: no joint pain ,no swelling  Neurological:no , syncope. Denies twitching, convulsions, loss of consciousness or memory. Endocrine:  . No history of goiter, exophthalmos or dryness of skin. The patient has no history of diabetes. Hematopoietic:  No history of bleeding disorders or easy bruising. Rheumatic:  No connective tissue disease history or polyarthritis/inflammatory joint disease. PHYSICAL EXAMINATION:  There were no vitals filed for this visit.       DATA:   PFT ordered     IMPRESSION:    1-SOB   2-Possible MIRIAN  3-recurrent COVID   4-High BMI                PLAN:      -SOB ,unclear etiology   -will do complete work up include PFT ,6 minutes ,CT scan chest   -decline repeat sleep study or titration   -check

## 2022-09-14 ENCOUNTER — TELEPHONE (OUTPATIENT)
Dept: FAMILY MEDICINE CLINIC | Age: 67
End: 2022-09-14

## 2022-09-14 ENCOUNTER — NURSE TRIAGE (OUTPATIENT)
Dept: OTHER | Facility: CLINIC | Age: 67
End: 2022-09-14

## 2022-09-14 ENCOUNTER — OFFICE VISIT (OUTPATIENT)
Dept: FAMILY MEDICINE CLINIC | Age: 67
End: 2022-09-14
Payer: MEDICARE

## 2022-09-14 VITALS
OXYGEN SATURATION: 97 % | HEART RATE: 110 BPM | DIASTOLIC BLOOD PRESSURE: 78 MMHG | SYSTOLIC BLOOD PRESSURE: 128 MMHG | BODY MASS INDEX: 40.12 KG/M2 | WEIGHT: 218 LBS | TEMPERATURE: 97.6 F | HEIGHT: 62 IN

## 2022-09-14 DIAGNOSIS — M54.50 ACUTE LEFT-SIDED LOW BACK PAIN WITHOUT SCIATICA: Primary | ICD-10-CM

## 2022-09-14 DIAGNOSIS — N39.0 URINARY TRACT INFECTION WITHOUT HEMATURIA, SITE UNSPECIFIED: ICD-10-CM

## 2022-09-14 DIAGNOSIS — R42 VERTIGO: ICD-10-CM

## 2022-09-14 DIAGNOSIS — F33.1 MODERATE EPISODE OF RECURRENT MAJOR DEPRESSIVE DISORDER (HCC): ICD-10-CM

## 2022-09-14 DIAGNOSIS — R42 DIZZINESS: ICD-10-CM

## 2022-09-14 DIAGNOSIS — R11.0 NAUSEA: ICD-10-CM

## 2022-09-14 LAB
BILIRUBIN, POC: ABNORMAL
BLOOD URINE, POC: ABNORMAL
CLARITY, POC: ABNORMAL
COLOR, POC: ABNORMAL
GLUCOSE URINE, POC: NEGATIVE
KETONES, POC: NEGATIVE
LEUKOCYTE EST, POC: POSITIVE
NITRITE, POC: ABNORMAL
PH, POC: 5.5
PROTEIN, POC: NEGATIVE
SPECIFIC GRAVITY, POC: >=1.03
UROBILINOGEN, POC: ABNORMAL

## 2022-09-14 PROCEDURE — 99213 OFFICE O/P EST LOW 20 MIN: CPT | Performed by: NURSE PRACTITIONER

## 2022-09-14 PROCEDURE — 1123F ACP DISCUSS/DSCN MKR DOCD: CPT | Performed by: NURSE PRACTITIONER

## 2022-09-14 PROCEDURE — 81002 URINALYSIS NONAUTO W/O SCOPE: CPT | Performed by: NURSE PRACTITIONER

## 2022-09-14 RX ORDER — PROMETHAZINE HYDROCHLORIDE 12.5 MG/1
12.5 TABLET ORAL EVERY 8 HOURS PRN
Qty: 20 TABLET | Refills: 0 | Status: SHIPPED | OUTPATIENT
Start: 2022-09-14

## 2022-09-14 RX ORDER — MECLIZINE HCL 12.5 MG/1
12.5 TABLET ORAL 3 TIMES DAILY PRN
Qty: 30 TABLET | Refills: 0 | Status: SHIPPED | OUTPATIENT
Start: 2022-09-14 | End: 2022-09-24

## 2022-09-14 RX ORDER — LIDOCAINE 50 MG/G
1 PATCH TOPICAL DAILY
Qty: 10 PATCH | Refills: 1 | Status: SHIPPED | OUTPATIENT
Start: 2022-09-14 | End: 2022-09-24

## 2022-09-14 RX ORDER — CEFDINIR 300 MG/1
300 CAPSULE ORAL 2 TIMES DAILY
Qty: 14 CAPSULE | Refills: 0 | Status: SHIPPED | OUTPATIENT
Start: 2022-09-14 | End: 2022-09-21

## 2022-09-14 ASSESSMENT — PATIENT HEALTH QUESTIONNAIRE - PHQ9
2. FEELING DOWN, DEPRESSED OR HOPELESS: 0
6. FEELING BAD ABOUT YOURSELF - OR THAT YOU ARE A FAILURE OR HAVE LET YOURSELF OR YOUR FAMILY DOWN: 0
SUM OF ALL RESPONSES TO PHQ QUESTIONS 1-9: 10
9. THOUGHTS THAT YOU WOULD BE BETTER OFF DEAD, OR OF HURTING YOURSELF: 0
5. POOR APPETITE OR OVEREATING: 2
3. TROUBLE FALLING OR STAYING ASLEEP: 3
1. LITTLE INTEREST OR PLEASURE IN DOING THINGS: 0
SUM OF ALL RESPONSES TO PHQ QUESTIONS 1-9: 10
SUM OF ALL RESPONSES TO PHQ QUESTIONS 1-9: 10
7. TROUBLE CONCENTRATING ON THINGS, SUCH AS READING THE NEWSPAPER OR WATCHING TELEVISION: 3
10. IF YOU CHECKED OFF ANY PROBLEMS, HOW DIFFICULT HAVE THESE PROBLEMS MADE IT FOR YOU TO DO YOUR WORK, TAKE CARE OF THINGS AT HOME, OR GET ALONG WITH OTHER PEOPLE: 1
4. FEELING TIRED OR HAVING LITTLE ENERGY: 2
SUM OF ALL RESPONSES TO PHQ9 QUESTIONS 1 & 2: 0
SUM OF ALL RESPONSES TO PHQ QUESTIONS 1-9: 10
8. MOVING OR SPEAKING SO SLOWLY THAT OTHER PEOPLE COULD HAVE NOTICED. OR THE OPPOSITE, BEING SO FIGETY OR RESTLESS THAT YOU HAVE BEEN MOVING AROUND A LOT MORE THAN USUAL: 0

## 2022-09-14 ASSESSMENT — ENCOUNTER SYMPTOMS
SINUS PAIN: 0
CONSTIPATION: 0
SHORTNESS OF BREATH: 1
NAUSEA: 1
RHINORRHEA: 0
SINUS PRESSURE: 0
DIARRHEA: 1
VOMITING: 1
COUGH: 1
SORE THROAT: 0
FACIAL SWELLING: 0
ABDOMINAL PAIN: 0

## 2022-09-14 NOTE — TELEPHONE ENCOUNTER
Received call from [facility] at [facility] with Red Flag Complaint. Subjective: Caller states \"nausea\"     Current Symptoms: nausea , chronic pain under left breast comes and goes , some sweating also does also feel weak and tired, and sob but sob has been going on for a long time (years) has had covid 2 times hx htn pulse runs 120's , has swelling in hands and feet but is on lasix, all the above symptoms have been gong on since May the Nausea  and dizziness is a new symptoms, some blurred vision, denies weakness on one side of body hx htn and tachycardia    Onset: 3 days ago; gradual    Associated Symptoms: NA    Pain Severity: 0/10; N/A; none    Temperature: none       What has been tried: nothing    LMP: NA Pregnant: NA    Recommended disposition: See in Office Today    Care advice provided, patient verbalizes understanding; denies any other questions or concerns; instructed to call back for any new or worsening symptoms. Patient/Caller agrees with recommended disposition; writer provided warm transfer to Isidro Loredo at [facility] for appointment scheduling     Attention Provider: Thank you for allowing me to participate in the care of your patient. The patient was connected to triage in response to information provided to the ECC/PSC. Please do not respond through this encounter as the response is not directed to a shared pool.          Reason for Disposition   MODERATE dizziness (e.g., interferes with normal activities) (Exception: dizziness caused by heat exposure, sudden standing, or poor fluid intake)    Protocols used: Dizziness-ADULT-OH

## 2022-09-14 NOTE — TELEPHONE ENCOUNTER
Patient notified of UA results. Advised to start medications as prescribed.  Urine to be sent on for culture

## 2022-09-14 NOTE — PROGRESS NOTES
Tessie Amaya (:  1955) is a 74 y.o. female,Established patient, here for evaluation of the following chief complaint(s):  Dizziness (X3 days) and Nausea         ASSESSMENT/PLAN:  1. Acute left-sided low back pain without sciatica  -     POCT Urinalysis no Micro  -     lidocaine (LIDODERM) 5 %; Place 1 patch onto the skin daily for 10 days 12 hours on, 12 hours off., Disp-10 patch, R-1Normal    2. Dizziness  -     POCT Urinalysis no Micro  -     meclizine (ANTIVERT) 12.5 MG tablet; Take 1 tablet by mouth 3 times daily as needed for Dizziness, Disp-30 tablet, R-0Normal  -     cefdinir (OMNICEF) 300 MG capsule; Take 1 capsule by mouth 2 times daily for 7 days, Disp-14 capsule, R-0Normal  -     Culture, Urine  -discussed changing positions slowly when getting up    3. Vertigo  -     meclizine (ANTIVERT) 12.5 MG tablet; Take 1 tablet by mouth 3 times daily as needed for Dizziness, Disp-30 tablet, R-0Normal    4. Urinary tract infection without hematuria, site unspecified  -     POCT Urinalysis no Micro  -     cefdinir (OMNICEF) 300 MG capsule; Take 1 capsule by mouth 2 times daily for 7 days, Disp-14 capsule, R-0Normal  -     Culture, Urine  -discussed antibiotics, patient reports allergy to sulfa but reports no other antibiotic allergies  -POCT UA positive for infection, will send on for culture. 5. Nausea    -phenergan 12.5mg po q8hrs prn    -stay hydrated    6. Moderate episode of recurrent major depressive disorder ([facility] [address]   - Continue current medication regimen     Return in about 4 weeks (around 10/12/2022) for Follow-up with PCP . Subjective   SUBJECTIVE/OBJECTIVE:  Reports feeling ill x 3 days. Reports symtpoms of \"room spinning\",  felt she kept going to her left when trying to walk, symptoms still present, worse when first getting up in the morning. Some nausea with the dizziness. Reports  chronic hearing loss in right ear and chronic tinnitus but no new symptoms relating to her ears. Reports back pain to her left lower back, reports this is not new for her. Denies any numbness or tingling. Denies any sciatic pain or loss of funtion to legs. Has used lidocaine patches in the past and feels this helps her but has been out of these for awhile. Reports she feels she has a UTI, reports she has had them before \"and this is how it feels\" Reports urgency but no frequency. Denies dysuria. Unsure if her nausea is related to this or the dizziness. No blood in urine. No fever or chills. Patient unable to give urine sample while in office, cup given and patient will return after she has a sample. Patient reports she does not feel depressed. States she feels good, denies any suicidal ideation. Feels her medicine is working well for her. Dizziness  This is a new problem. The current episode started in the past 7 days. The problem occurs daily. The problem has been unchanged. Associated symptoms include chest pain, coughing, fatigue, headaches, nausea and vomiting. Pertinent negatives include no abdominal pain, chills, congestion, fever or sore throat. The symptoms are aggravated by walking and twisting. Review of Systems   Constitutional:  Positive for fatigue. Negative for chills and fever. HENT:  Negative for congestion, ear discharge, ear pain, facial swelling, postnasal drip, rhinorrhea, sinus pressure, sinus pain, sneezing and sore throat. Respiratory:  Positive for cough and shortness of breath. Reports chronic cough, no worse than usual      Cardiovascular:  Positive for chest pain and leg swelling. Chest pain under left breast is also chronic for her   Leg swelling is chronic, no changes   Gastrointestinal:  Positive for diarrhea, nausea and vomiting. Negative for abdominal pain and constipation. Chronic diarrhea from IBS    Genitourinary:  Positive for urgency. Negative for difficulty urinating, frequency and hematuria.    Neurological:  Positive for dizziness, light-headedness and headaches. Objective   Physical Exam  Constitutional:       General: She is not in acute distress. Appearance: She is not ill-appearing. HENT:      Right Ear: Tympanic membrane and external ear normal. No decreased hearing noted. Left Ear: Tympanic membrane, ear canal and external ear normal. Decreased hearing noted. Ears:      Comments: Right ear with scar tissue     Eyes:      General: Lids are normal.         Right eye: No foreign body or discharge. Left eye: No foreign body or discharge. Extraocular Movements:      Right eye: Nystagmus present. Left eye: Nystagmus present. Cardiovascular:      Rate and Rhythm: Normal rate and regular rhythm. Comments: Non pitting bilateral lower extremities, patient reports chronic for her, she did not take her lasix yet today   Pulmonary:      Effort: Pulmonary effort is normal.      Breath sounds: Normal breath sounds. Abdominal:      General: Bowel sounds are normal. There is no distension. Palpations: Abdomen is soft. Tenderness: There is no abdominal tenderness. There is no right CVA tenderness or left CVA tenderness. Musculoskeletal:        Arms:       Cervical back: Normal range of motion. Edema present. No rigidity. Right lower le+ Edema present. Left lower le+ Edema present. Comments: Back pain to left lower back. No step-off, no tenderness to palpation. No bruising    Lymphadenopathy:      Cervical: No cervical adenopathy. Neurological:      General: No focal deficit present. Mental Status: She is alert. GCS: GCS eye subscore is 4. GCS verbal subscore is 5. GCS motor subscore is 6. Psychiatric:         Attention and Perception: Attention normal.         Mood and Affect: Mood and affect normal.         Behavior: Behavior normal. Behavior is cooperative. Thought Content: Thought content does not include homicidal or suicidal ideation. Thought content does not include suicidal plan. This note was generated completely or in part utilizing Dragon dictation speech recognition software. Occasionally, words are mistranscribed and despite editing, the text may contain inaccuracies due to incorrect word recognition. If further clarification is needed please contact the office at [phone]. An electronic signature was used to authenticate this note.     --Princeton Olszewski, APRN - CNP

## 2022-09-16 ENCOUNTER — TELEPHONE (OUTPATIENT)
Dept: FAMILY MEDICINE CLINIC | Age: 67
End: 2022-09-16

## 2022-09-16 LAB
ORGANISM: ABNORMAL
URINE CULTURE, ROUTINE: ABNORMAL

## 2022-09-21 ENCOUNTER — HOSPITAL ENCOUNTER (OUTPATIENT)
Dept: PULMONOLOGY | Age: 67
Discharge: HOME | End: 2022-09-21
Payer: MEDICARE

## 2022-09-21 DIAGNOSIS — J84.9 ILD (INTERSTITIAL LUNG DISEASE) (HCC): ICD-10-CM

## 2022-09-21 LAB
DLCO %PRED: 83 %
DLCO PRED: NORMAL
DLCO/VA %PRED: NORMAL
DLCO/VA PRED: NORMAL
DLCO/VA: NORMAL
DLCO: NORMAL
EXPIRATORY TIME-POST: NORMAL
EXPIRATORY TIME: NORMAL
FEF 25-75% %CHNG: NORMAL
FEF 25-75% %PRED-POST: NORMAL
FEF 25-75% %PRED-PRE: NORMAL
FEF 25-75% PRED: NORMAL
FEF 25-75%-POST: NORMAL
FEF 25-75%-PRE: NORMAL
FEV1 %PRED-POST: 82 %
FEV1 %PRED-PRE: 80 %
FEV1 PRED: NORMAL
FEV1-POST: NORMAL
FEV1-PRE: NORMAL
FEV1/FVC %PRED-POST: NORMAL
FEV1/FVC %PRED-PRE: NORMAL
FEV1/FVC PRED: NORMAL
FEV1/FVC-POST: 75 %
FEV1/FVC-PRE: 74 %
FVC %PRED-POST: NORMAL
FVC %PRED-PRE: NORMAL
FVC PRED: NORMAL
FVC-POST: NORMAL
FVC-PRE: NORMAL
GAW %PRED: NORMAL
GAW PRED: NORMAL
GAW: NORMAL
IC %PRED: NORMAL
IC PRED: NORMAL
IC: NORMAL
MEP: NORMAL
MIP: NORMAL
MVV %PRED-PRE: NORMAL
MVV PRED: NORMAL
MVV-PRE: NORMAL
PEF %PRED-POST: NORMAL
PEF %PRED-PRE: NORMAL
PEF PRED: NORMAL
PEF%CHNG: NORMAL
PEF-POST: NORMAL
PEF-PRE: NORMAL
RAW %PRED: NORMAL
RAW PRED: NORMAL
RAW: NORMAL
RV %PRED: NORMAL
RV PRED: NORMAL
RV: NORMAL
SVC %PRED: NORMAL
SVC PRED: NORMAL
SVC: NORMAL
TLC %PRED: 79 %
TLC PRED: NORMAL
TLC: NORMAL
VA %PRED: NORMAL
VA PRED: NORMAL
VA: NORMAL
VTG %PRED: NORMAL
VTG PRED: NORMAL
VTG: NORMAL

## 2022-09-21 PROCEDURE — 94726 PLETHYSMOGRAPHY LUNG VOLUMES: CPT

## 2022-09-21 PROCEDURE — 6370000000 HC RX 637 (ALT 250 FOR IP): Performed by: INTERNAL MEDICINE

## 2022-09-21 PROCEDURE — 94060 EVALUATION OF WHEEZING: CPT

## 2022-09-21 PROCEDURE — 94729 DIFFUSING CAPACITY: CPT

## 2022-09-21 PROCEDURE — 94618 PULMONARY STRESS TESTING: CPT

## 2022-09-21 PROCEDURE — 94640 AIRWAY INHALATION TREATMENT: CPT

## 2022-09-21 RX ORDER — ALBUTEROL SULFATE 90 UG/1
4 AEROSOL, METERED RESPIRATORY (INHALATION) ONCE
Status: COMPLETED | OUTPATIENT
Start: 2022-09-21 | End: 2022-09-21

## 2022-09-21 RX ADMIN — Medication 4 PUFF: at 09:22

## 2022-09-21 ASSESSMENT — PULMONARY FUNCTION TESTS
FEV1_PERCENT_PREDICTED_POST: 82
FEV1_PERCENT_PREDICTED_PRE: 80
FEV1/FVC_PRE: 74
FEV1/FVC_POST: 75

## 2022-10-10 NOTE — PROGRESS NOTES
[facility]   Cardiac Consultation    Referring Provider:  RENARD Guerra     Chief Complaint   Patient presents with    Follow-up     5 mo    Hypertension    Shortness of Breath    Tachycardia    Other     FLUTTERING     Originally saw 5/20/22  Subjective: Ms. Awilda Santa is here today to for routine f/u/ c/o chronic SOB and fatigue     History of Present Illness:  Julianne Martell is a 74 y.o. female who has PMH of HTN, tobacco abuse (quit 2010--1ppd prior), and Fibromyalgia. Most recent GXT stress ECHO 11/12/14 Negative ECG for ischemia with graded exercise test. Duke Treadmill Score is 5 which indicates low risk. Negative stress echo with no indication of inducible ischemia. Most recent EKG 3/31/22 SR 97 bpm; NST change. Most recent 48 hour monitor 5/20-5/23/22 Predominately NSR, PAC's 0.02%, PVC 0.01%. Most recent ECHO 6/15/22 LVEF>65%. mild cLVH. Grade I DD. Mild MAC; Trace to mild MR. Trace TR. Elevated heart rate throughout study. Today, she reports she is still SOB which has been chronic for years. Reports VILLELA with incline or stairs but does OK walking level ground. She has has Covid 3 times and feels like VILLELA worse after most recent infection. She talked to lung Dr. Chiara Pitts and he ordered 6 MWT which was normal without hypoxia walking. Did not see him in office but virtual visit. Did not complete rest of pulm tests he ordered (ie. PFT's, CT imaging). She takes lasix for swelling. Patient denies current edema, chest pain, palpitations, dizziness or syncope. Patient is taking all cardiac medications as prescribed and tolerates them well. Note BNP=32 in May 2022. Weight today is 218# (Up 3# from 5/2022)    Patient is vaccinated against Covid. Drake Cornejo 2/2 Covid+ 2021    Past Medical History:   has a past medical history of Colitis, Diverticulosis, MVP (mitral valve prolapse), and Spastic colon. Surgical History:   has a past surgical history that includes Hysterectomy;  Inner ear surgery; Carpal tunnel release; Appendectomy; Tonsillectomy; Breast enhancement surgery (1973); Upper gastrointestinal endoscopy (2017); Colonoscopy (2017); and joint replacement (Right). Social History:   reports that she has quit smoking in . Smoked 1ppd prior. She has never used smokeless tobacco. She reports that she does not drink alcohol and does not use drugs. She lives in [facility], she is not . She had 1 daughter. Quit smoking in . Smoked 1 pack a day. Denies alcohol and drugs    Family History:  family history includes Cancer in her sister; Heart Disease in her father, maternal grandfather, and mother; High Blood Pressure in her father and mother. dad  at 80 of heart attack. Mom had heart problem that went undiagnosed and  from that. Home Medications:  Prior to Admission medications    Medication Sig Start Date End Date Taking? Authorizing Provider   promethazine (PHENERGAN) 12.5 MG tablet Take 1 tablet by mouth every 8 hours as needed for Nausea 22  Yes ERVIN Sanchez CNP   calcium carbonate (OS-SOHA) 1250 (500 Ca) MG chewable tablet Take 500 mg by mouth daily as needed   Yes Historical Provider, MD   furosemide (LASIX) 40 MG tablet TAKE 1 TABLET BY MOUTH ONCE DAILY 22  Yes Stevan Juan MD   DULoxetine (CYMBALTA) 60 MG extended release capsule Take 1 capsule by mouth daily 22  Yes RENARD Bernard   Esomeprazole Magnesium (NEXIUM PO) Take 20 mg by mouth daily    Yes Historical Provider, MD        Allergies:  Omeprazole, Sertraline, Sulfa antibiotics, and Propofol     Review of Systems:   Constitutional: there has been no unanticipated weight loss. There's been no change in energy level, sleep pattern, or activity level. Eyes: No visual changes or diplopia. No scleral icterus. ENT: No Headaches, hearing loss or vertigo. No mouth sores or sore throat.   Cardiovascular: Reviewed in HPI  Respiratory: No cough or wheezing, no sputum production. No hematemesis. Gastrointestinal: No abdominal pain, appetite loss, blood in stools. No change in bowel or bladder habits. Genitourinary: No dysuria, trouble voiding, or hematuria. Musculoskeletal:  No gait disturbance, weakness or joint complaints. Integumentary: No rash or pruritis. Neurological: No headache, diplopia, change in muscle strength, numbness or tingling. No change in gait, balance, coordination, mood, affect, memory, mentation, behavior. Psychiatric: No anxiety, no depression. Endocrine: No malaise, fatigue or temperature intolerance. No excessive thirst, fluid intake, or urination. No tremor. Hematologic/Lymphatic: No abnormal bruising or bleeding, blood clots or swollen lymph nodes. Allergic/Immunologic: No nasal congestion or hives. Physical Examination:    Vitals:    10/11/22 1547   BP: 132/66   Pulse: (!) 115   SpO2: 96%          Constitutional and General Appearance: NAD   Respiratory:  Normal excursion and expansion without use of accessory muscles  Resp Auscultation: Clear, no crackles or wheezes   Cardiovascular: The apical impulses not displaced  Heart tones are crisp and normal  Cervical veins are not engorged  The carotid upstroke is normal in amplitude and contour without delay or bruit  Normal S1S2, No S3, No Murmur  Peripheral pulses are symmetrical and full  There is no clubbing, cyanosis of the extremities. Trace  BLE edema  Femoral Arteries: 2+ and equal  Pedal Pulses: 2+ and equal   Abdomen:  No masses or tenderness  Liver/Spleen: No Abnormalities Noted  Neurological/Psychiatric:  Alert and oriented in all spheres  Moves all extremities well  Exhibits normal gait balance and coordination  No abnormalities of mood, affect, memory, mentation, or behavior are noted  Skin:  Skin: warm and dry.     Lab Results   Component Value Date    CHOL 164 04/01/2022    CHOL 183 08/23/2016     Lab Results   Component Value Date    TRIG 106 04/01/2022    TRIG 120 08/23/2016     Lab Results   Component Value Date    HDL 55 04/01/2022    HDL 63 (H) 08/23/2016     Lab Results   Component Value Date    LDLCALC 90 04/01/2022    LDLCALC 96 08/23/2016     Lab Results   Component Value Date    LABVLDL 24 08/23/2016    VLDL 19 04/01/2022     No results found for: CHOLHDLRATIO    5/20/22 BNP 32    Assessment:     1. Essential hypertension: Well controlled and will continue current medical regimen. 2. Abnormal EKG:  Most recent EKG 3/31/22 SR 97 bpm; NST change. I do not see concerning findings on this study. 3. SOB (shortness of breath) on exertion: Need concern for HFpEF vs CAD vs underlying lung dz vs MIRIAN vs obesity/deconditioning vs combination. She is going back to Dr. Jil Elam to complete pulm w/u. If he cannot find pulm reason for SOB I will likely have RHC/LHC performed to assess cors, right heart/pulm pressures. Possible HFpEF but is diagnosis of exclusion and want pulm w/u completed prior. 4. LE edema: See #3 above. Improved with lasix and will continue 40mg daily. 5. Tachycardia: Mildly tachycardic. Note SYED in May 2022 without significant arrhythmia. Avg HR 96bpm; rare PAC's/PVC's. I will start cardizem 120mg daily both for elevated HR and BP to see if helps. If increase diastolic filling time she may feel better. Plan:  1. Current medications reviewed. Refills given as warranted. 2. I would recommend calling Dr. Jarrett Lundberg's office and make an appointment to actually meet with him in person.   -there is probably more testing he can order for you.   -I want for you to have a full pulmonary work up.   -if he can't find a pulmonary reason for your shortness of breath then I will consider doing a heart cath   -please let me know when you have your appointment made with Dr. Jil Elam  3.  Start cardizem 120 mg daily to help lower your heart rate and blood pressure    Follow up with me in 3 months    This note is scribed in the presence of Dr. Rober Mcmahon. Alia Boswell by Marizol Ruiz RN.    I, Dr. Sarah Singleton, personally performed the services described in this documentation, as scribed by the above signed scribe in my presence. It is both accurate and complete to my knowledge. I agree with the details independently gathered by the clinical support staff, while the remaining scribed note accurately describes my personal service to the patient. Cost of prescription medications and patient compliance have been reviewed with patient. All questions answered. Thank you for allowing me to participate in the care of this individual.    Valeriy Estrada.  Alia Boswell M.D., [facility]

## 2022-10-11 ENCOUNTER — OFFICE VISIT (OUTPATIENT)
Dept: CARDIOLOGY CLINIC | Age: 67
End: 2022-10-11
Payer: MEDICARE

## 2022-10-11 VITALS
DIASTOLIC BLOOD PRESSURE: 66 MMHG | BODY MASS INDEX: 40.12 KG/M2 | OXYGEN SATURATION: 96 % | WEIGHT: 218 LBS | SYSTOLIC BLOOD PRESSURE: 132 MMHG | HEIGHT: 62 IN | HEART RATE: 115 BPM

## 2022-10-11 DIAGNOSIS — Z87.891 HISTORY OF TOBACCO ABUSE: Primary | ICD-10-CM

## 2022-10-11 PROCEDURE — 99214 OFFICE O/P EST MOD 30 MIN: CPT | Performed by: INTERNAL MEDICINE

## 2022-10-11 PROCEDURE — 1123F ACP DISCUSS/DSCN MKR DOCD: CPT | Performed by: INTERNAL MEDICINE

## 2022-10-11 RX ORDER — DILTIAZEM HYDROCHLORIDE 120 MG/1
120 CAPSULE, COATED, EXTENDED RELEASE ORAL DAILY
Qty: 90 CAPSULE | Refills: 3 | Status: SHIPPED | OUTPATIENT
Start: 2022-10-11

## 2022-10-11 NOTE — PATIENT INSTRUCTIONS
Plan:  1. Current medications reviewed. Refills given as warranted. 2. I would recommend calling Dr. Kelsy Lundberg's office and make an appointment to actually meet with him in person.              -there is probably more testing he can order for you.              -I want for you to have a full pulmonary work up.              -if he can't find a pulmonary reason for your shortness of breath then I will consider doing a heart cath              -please let me know when you have your appointment made with Dr. Salomon Moffett  3.  Start cardizem 120 mg daily to help lower your heart rate and blood pressure     Follow up with me in 3 months

## 2022-10-14 ENCOUNTER — TELEPHONE (OUTPATIENT)
Dept: PULMONOLOGY | Age: 67
End: 2022-10-14

## 2022-10-14 NOTE — TELEPHONE ENCOUNTER
Patient is scheduled for ct that Dr. Williams Graves ordered on 10/18/22 and fua 11/9/22 okay to keep as scheduled?

## 2022-10-14 NOTE — TELEPHONE ENCOUNTER
Per LOV with dr. Herr Face:  1. Current medications reviewed. Refills given as warranted. 2. I would recommend calling Dr. Reynaldo Lundberg's office and make an appointment to actually meet with him in person.              -there is probably more testing he can order for you.              -I want for you to have a full pulmonary work up.              -if he can't find a pulmonary reason for your shortness of breath then I will consider doing a heart cath              -please let me know when you have your appointment made with Dr. Ryann Alvarez  3. Start cardizem 120 mg daily to help lower your heart rate and blood pressure     Follow up with me in 3 months    Please advise on when to schedule pt.

## 2022-10-18 ENCOUNTER — HOSPITAL ENCOUNTER (OUTPATIENT)
Dept: CT IMAGING | Age: 67
Discharge: HOME | End: 2022-10-18
Payer: MEDICARE

## 2022-10-18 DIAGNOSIS — J84.9 ILD (INTERSTITIAL LUNG DISEASE) (HCC): ICD-10-CM

## 2022-10-18 PROCEDURE — 71250 CT THORAX DX C-: CPT

## 2022-10-18 NOTE — PROGRESS NOTES
Patient presented today with script from Dr. Henry Rodrigez office for a Chest ct  w/o contrast.   Dx was ILD which is usually associated with a High Res Chest.  Office was called and order was clarified. Dr. Aung Kumar places his own orders and while he was not in the office to verify,  tech felt that it was best to complete test as ordered. Order was placed from within the office and not over the phone with scheduling.   MAR  [phone], 10/18/2022

## 2022-10-26 ENCOUNTER — TELEPHONE (OUTPATIENT)
Dept: PULMONOLOGY | Age: 67
End: 2022-10-26

## 2022-10-26 DIAGNOSIS — R16.0 LIVER MASS: Primary | ICD-10-CM

## 2022-11-04 NOTE — TELEPHONE ENCOUNTER
Spoke with [facility] they did confirm they received referral but referral was not sent back to provider for review. States they will make sure this happens today has dx is urgent. They will reach back out to the office and let us know what the plan is.  Will watch for jd

## 2022-11-04 NOTE — TELEPHONE ENCOUNTER
[facility] called back states the referral was addressed pt was informed to complete labs/ct prior to appt, pt has not completed yet

## 2022-11-07 ENCOUNTER — HOSPITAL ENCOUNTER (OUTPATIENT)
Dept: CT IMAGING | Age: 67
Discharge: HOME | End: 2022-11-07
Payer: MEDICARE

## 2022-11-07 ENCOUNTER — HOSPITAL ENCOUNTER (OUTPATIENT)
Age: 67
Discharge: HOME | End: 2022-11-07
Payer: MEDICARE

## 2022-11-07 DIAGNOSIS — R16.0 LIVER MASS: ICD-10-CM

## 2022-11-07 LAB
CREAT SERPL-MCNC: 0.7 MG/DL (ref 0.6–1.2)
GFR SERPL CREATININE-BSD FRML MDRD: >60 ML/MIN/{1.73_M2}

## 2022-11-07 PROCEDURE — 82565 ASSAY OF CREATININE: CPT

## 2022-11-07 PROCEDURE — 74170 CT ABD WO CNTRST FLWD CNTRST: CPT

## 2022-11-07 PROCEDURE — 6360000004 HC RX CONTRAST MEDICATION: Performed by: INTERNAL MEDICINE

## 2022-11-07 RX ADMIN — IOPAMIDOL 75 ML: 755 INJECTION, SOLUTION INTRAVENOUS at 16:24

## 2022-11-09 ENCOUNTER — OFFICE VISIT (OUTPATIENT)
Dept: PULMONOLOGY | Age: 67
End: 2022-11-09
Payer: MEDICARE

## 2022-11-09 ENCOUNTER — HOSPITAL ENCOUNTER (OUTPATIENT)
Age: 67
Discharge: HOME | End: 2022-11-09
Payer: MEDICARE

## 2022-11-09 VITALS
OXYGEN SATURATION: 96 % | DIASTOLIC BLOOD PRESSURE: 80 MMHG | HEART RATE: 117 BPM | HEIGHT: 61 IN | BODY MASS INDEX: 41.91 KG/M2 | WEIGHT: 222 LBS | SYSTOLIC BLOOD PRESSURE: 132 MMHG | RESPIRATION RATE: 16 BRPM

## 2022-11-09 DIAGNOSIS — R06.02 SOB (SHORTNESS OF BREATH): ICD-10-CM

## 2022-11-09 DIAGNOSIS — R06.02 SOB (SHORTNESS OF BREATH): Primary | ICD-10-CM

## 2022-11-09 LAB — D DIMER: 0.45 UG/ML FEU (ref 0–0.6)

## 2022-11-09 PROCEDURE — 86140 C-REACTIVE PROTEIN: CPT

## 2022-11-09 PROCEDURE — 36415 COLL VENOUS BLD VENIPUNCTURE: CPT

## 2022-11-09 PROCEDURE — 3078F DIAST BP <80 MM HG: CPT | Performed by: INTERNAL MEDICINE

## 2022-11-09 PROCEDURE — 3074F SYST BP LT 130 MM HG: CPT | Performed by: INTERNAL MEDICINE

## 2022-11-09 PROCEDURE — 82785 ASSAY OF IGE: CPT

## 2022-11-09 PROCEDURE — 1123F ACP DISCUSS/DSCN MKR DOCD: CPT | Performed by: INTERNAL MEDICINE

## 2022-11-09 PROCEDURE — 85048 AUTOMATED LEUKOCYTE COUNT: CPT

## 2022-11-09 PROCEDURE — 85379 FIBRIN DEGRADATION QUANT: CPT

## 2022-11-09 PROCEDURE — 99214 OFFICE O/P EST MOD 30 MIN: CPT | Performed by: INTERNAL MEDICINE

## 2022-11-09 PROCEDURE — 86038 ANTINUCLEAR ANTIBODIES: CPT

## 2022-11-09 NOTE — PROGRESS NOTES
[facility]                                               Pulmonary Clinic Consult     I had the pleasure of seeing  Adama Adame     Chief Complaint   Patient presents with    Follow-up     Pft/ct    Shortness of Breath       HISTORY OF PRESENT ILLNESS:    Adama Adame is a 74y.o. year old  Who  has 30 PPY smoking history     She  quit smoking 10 years ,still vape once in while     The Patient comes in with SOB that has been going on the last 10  years  Associated with cough ,however it got worse with ambulation ,he  states that it get worse with exercise or walking long distance and he can walk . 1 mile .  And go 1 flight of stairs before get short winded    He/She  has cough ,productive for   Sputum and it is more in the   Had Covid 3 times   She had stress and   She has sleep apnea she states there is no way to put cpap   Today visit  She states she is feeling better  Denies any CP  No hemoptysis  Gain weight last 2-3 years  Still with VILLELA        ALLERGIES:    Allergies   Allergen Reactions    Omeprazole Diarrhea    Sertraline Other (See Comments)     Suicidal ideation requiring hospitalization  Suicidal ideation requiring hospitalization  Suicidal ideation requiring hospitalization    Sulfa Antibiotics Nausea Only and Other (See Comments)     dizziness    Propofol Nausea And Vomiting     Pt notes along with nausea and vomiting she had difficulty waking up after surgery       PAST MEDICAL HISTORY:       Diagnosis Date    Colitis     Diverticulosis     MVP (mitral valve prolapse)     Spastic colon        MEDICATIONS:   Current Outpatient Medications   Medication Sig Dispense Refill    dilTIAZem (CARDIZEM CD) 120 MG extended release capsule Take 1 capsule by mouth daily 90 capsule 3    promethazine (PHENERGAN) 12.5 MG tablet Take 1 tablet by mouth every 8 hours as needed for Nausea 20 tablet 0    calcium carbonate (OS-SOHA) 1250 (500 Ca) MG chewable tablet Take 500 mg by mouth daily as needed      furosemide (LASIX) 40 MG tablet TAKE 1 TABLET BY MOUTH ONCE DAILY 90 tablet 3    DULoxetine (CYMBALTA) 60 MG extended release capsule Take 1 capsule by mouth daily 90 capsule 1    Esomeprazole Magnesium (NEXIUM PO) Take 20 mg by mouth daily        No current facility-administered medications for this visit. 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 There has been no weight changes. No fevers, fatigue or weakness. Head: Patient denies any history of trauma, convulsive disorder or syncope. Skin:  Patient denies history of changes in pigmentation, eruptions or pruritus. No easy bruising or bleeding. EENT: no nasal congestion   Cardiovascular ,No chest pain ,No edema ,  Respiration:SOB: + ,VILLELA :+  Gastrointestinal:No GI bleed ,no melena  ,no hematemesis    Musculoskeletal: no joint pain ,no swelling  Neurological:no , syncope. Denies twitching, convulsions, loss of consciousness or memory. Endocrine:  . No history of goiter, exophthalmos or dryness of skin. The patient has no history of diabetes. Hematopoietic:  No history of bleeding disorders or easy bruising.   Rheumatic:  No connective tissue disease history or polyarthritis/inflammatory joint disease. PHYSICAL EXAMINATION:  Vitals:    11/09/22 1525   BP: 132/80   Pulse: (!) 117   Resp: 16   SpO2: 96%     CVS S1,S2  Chest NO murmer  Abdomen no tenderness  CNS no focal deficit  Ext no edema     DATA:   PFT restrictive pattern  Normal DLCO    IMPRESSION:    1-SOB   2-Possible MIRIAN,sleep study down the road if not lost weight in 3 months   3-recurrent COVID   4-High BMI              5- Liver mass followed by GI   PLAN:      -SOB ,unclear etiology ,seems decondition with weight gain and post covid  PFT/CT looks ok ,6 minutes walk is very good ,no hypoxia   -decline repeat sleep study or titration   -check CRP<ESRE ,AMELIE   _ check IgE and eosnophilic count     CT scan no UIP or fibrotic changes     Flu and Pneumovax as per primary     Thank you for allowing me to participate in [facility]. I will keep following with you ,should you have any concerns ,please contact us at [place] pulmonary office     Sincerely,        Favian Harvey MD  Pulmonary & Critical Care Medicine     NOTE: This report was transcribed using voice recognition software. Every effort was made to ensure accuracy; however, inadvertent computerized transcription errors may be present.

## 2022-11-10 LAB
ANTI-NUCLEAR ANTIBODY (ANA): NEGATIVE
C-REACTIVE PROTEIN: 21.6 MG/L (ref 0–5.1)
EOSINOPHILS ABSOLUTE COUNT: 0.2 K/UL (ref 0–0.6)

## 2022-11-11 LAB — IGE: 25 KU/L

## 2022-11-21 DIAGNOSIS — M79.7 FIBROMYALGIA: ICD-10-CM

## 2022-11-21 DIAGNOSIS — F33.1 MODERATE EPISODE OF RECURRENT MAJOR DEPRESSIVE DISORDER (HCC): ICD-10-CM

## 2022-11-21 RX ORDER — DULOXETIN HYDROCHLORIDE 60 MG/1
60 CAPSULE, DELAYED RELEASE ORAL DAILY
Qty: 90 CAPSULE | Refills: 1 | Status: SHIPPED | OUTPATIENT
Start: 2022-11-21

## 2022-11-21 NOTE — TELEPHONE ENCOUNTER
Refill Request     CONFIRM preferrred pharmacy with the patient. If Mail Order Rx - Pend for 90 day refill. Last Seen: Last Seen Department: 9/14/2022  Last Seen by PCP: 5/5/2022    Last Written: 5/5/22 1 refil    If no future appointment scheduled, route STAFF MESSAGE with patient name to the [facility] for scheduling. Next Appointment:   Future Appointments   Date Time Provider Verna Salguero   11/28/2022 10:30 AM RENARD Gilmore [place] - [facility]   2/6/2023  1:00 PM Cintia Overton MD [facility]   2/9/2023  2:15 PM Shy Garber MD [facility]       Message sent to [facility] to schedule appt with patient?   NO      Requested Prescriptions      No prescriptions requested or ordered in this encounter

## 2022-11-28 ENCOUNTER — OFFICE VISIT (OUTPATIENT)
Dept: FAMILY MEDICINE CLINIC | Age: 67
End: 2022-11-28
Payer: MEDICARE

## 2022-11-28 VITALS
WEIGHT: 220 LBS | HEART RATE: 97 BPM | DIASTOLIC BLOOD PRESSURE: 86 MMHG | SYSTOLIC BLOOD PRESSURE: 132 MMHG | TEMPERATURE: 97.7 F | OXYGEN SATURATION: 94 % | BODY MASS INDEX: 41.54 KG/M2 | HEIGHT: 61 IN

## 2022-11-28 DIAGNOSIS — Z00.00 INITIAL MEDICARE ANNUAL WELLNESS VISIT: Primary | ICD-10-CM

## 2022-11-28 PROCEDURE — 1123F ACP DISCUSS/DSCN MKR DOCD: CPT | Performed by: PHYSICIAN ASSISTANT

## 2022-11-28 PROCEDURE — 3074F SYST BP LT 130 MM HG: CPT | Performed by: PHYSICIAN ASSISTANT

## 2022-11-28 PROCEDURE — G0438 PPPS, INITIAL VISIT: HCPCS | Performed by: PHYSICIAN ASSISTANT

## 2022-11-28 PROCEDURE — 3078F DIAST BP <80 MM HG: CPT | Performed by: PHYSICIAN ASSISTANT

## 2022-11-28 RX ORDER — LIDOCAINE 50 MG/G
1 PATCH TOPICAL DAILY
Qty: 30 PATCH | Refills: 0 | Status: SHIPPED | OUTPATIENT
Start: 2022-11-28 | End: 2022-12-28

## 2022-11-28 ASSESSMENT — PATIENT HEALTH QUESTIONNAIRE - PHQ9
1. LITTLE INTEREST OR PLEASURE IN DOING THINGS: 0
2. FEELING DOWN, DEPRESSED OR HOPELESS: 0
9. THOUGHTS THAT YOU WOULD BE BETTER OFF DEAD, OR OF HURTING YOURSELF: 0
8. MOVING OR SPEAKING SO SLOWLY THAT OTHER PEOPLE COULD HAVE NOTICED. OR THE OPPOSITE, BEING SO FIGETY OR RESTLESS THAT YOU HAVE BEEN MOVING AROUND A LOT MORE THAN USUAL: 0
10. IF YOU CHECKED OFF ANY PROBLEMS, HOW DIFFICULT HAVE THESE PROBLEMS MADE IT FOR YOU TO DO YOUR WORK, TAKE CARE OF THINGS AT HOME, OR GET ALONG WITH OTHER PEOPLE: 0
7. TROUBLE CONCENTRATING ON THINGS, SUCH AS READING THE NEWSPAPER OR WATCHING TELEVISION: 0
5. POOR APPETITE OR OVEREATING: 0
3. TROUBLE FALLING OR STAYING ASLEEP: 0
SUM OF ALL RESPONSES TO PHQ QUESTIONS 1-9: 0
6. FEELING BAD ABOUT YOURSELF - OR THAT YOU ARE A FAILURE OR HAVE LET YOURSELF OR YOUR FAMILY DOWN: 0
4. FEELING TIRED OR HAVING LITTLE ENERGY: 0
SUM OF ALL RESPONSES TO PHQ9 QUESTIONS 1 & 2: 0

## 2022-11-28 ASSESSMENT — LIFESTYLE VARIABLES
HOW MANY STANDARD DRINKS CONTAINING ALCOHOL DO YOU HAVE ON A TYPICAL DAY: PATIENT DOES NOT DRINK
HOW OFTEN DO YOU HAVE A DRINK CONTAINING ALCOHOL: NEVER

## 2022-11-28 NOTE — PROGRESS NOTES
Medicare Annual Wellness Visit    Juaquin Gabriel is here for Annual Exam (Patient stated that she is having back problems, lower left side. )    Assessment & Plan   Initial Medicare annual wellness visit    Recommendations for Preventive Services Due: see orders and patient instructions/AVS.  Recommended screening schedule for the next 5-10 years is provided to the patient in written form: see Patient Instructions/AVS.     Return for Medicare Annual Wellness Visit in 1 year. Subjective   The following acute and/or chronic problems were also addressed today:  Reviewed cardiology and pulmonology notes  Will follow up with GI regarding abnormal CT abd- needs abdominal MRI   Patient declines PT referral for back pain. Requests that lidocain patches be sent in. Patient's complete Health Risk Assessment and screening values have been reviewed and are found in Flowsheets. The following problems were reviewed today and where indicated follow up appointments were made and/or referrals ordered.     Positive Risk Factor Screenings with Interventions:    Fall Risk:  Do you feel unsteady or are you worried about falling? : no  2 or more falls in past year?: no  Fall with injury in past year?: (!) yes   Fall Risk Interventions:    Home safety tips provided            General Health and ACP:  General  In general, how would you say your health is?: Good  In the past 7 days, have you experienced any of the following: New or Increased Pain, New or Increased Fatigue, Loneliness, Social Isolation, Stress or Anger?: (!) Yes  Select all that apply: (!) New or Increased Fatigue, New or Increased Pain  Do you get the social and emotional support that you need?: Yes  Do you have a Living Will?: (!) No    Advance Directives       Power of  Living Will ACP-Advance Directive ACP-Power of     Not on File Not on File Not on File Not on File        General Health Risk Interventions:  Pain issues: home exercises provided, chronic back pain, declines PT referral. Will send home with stretches  Fatigue: regular exercise recommended- 3-5 times per week, 30-45 minutes per session, untreated sleep apnea, encouraged to follow up with pulm for sleep study  No Living Will: Patient declines ACP discussion/assistance    Health Habits/Nutrition:  Physical Activity: Insufficiently Active    Days of Exercise per Week: 7 days    Minutes of Exercise per Session: 20 min     Have you lost any weight without trying in the past 3 months?: No  Body mass index: (!) 41.56  Have you seen the dentist within the past year?: (!) No  Health Habits/Nutrition Interventions:  Dental exam overdue:  patient encouraged to make appointment with his/her dentist    Hearing/Vision:  Do you or your family notice any trouble with your hearing that hasn't been managed with hearing aids?: No  Do you have difficulty driving, watching TV, or doing any of your daily activities because of your eyesight?: No  Have you had an eye exam within the past year?: (!) No  No results found.   Hearing/Vision Interventions:  Vision concerns:  patient encouraged to make appointment with his/her eye specialist     ADLs:  In the past 7 days, did you need help from others to perform any of the following everyday activities: Eating, dressing, grooming, bathing, toileting, or walking/balance?: No  In the past 7 days, did you need help from others to take care of any of the following: Laundry, housekeeping, banking/finances, shopping, telephone use, food preparation, transportation, or taking medications?: (!) Yes  Select all that apply: (!) Housekeeping  ADL Interventions:  Patient declines any further evaluation/treatment for this issue          Objective   Vitals:    11/28/22 1029   BP: 132/86   Site: Right Upper Arm   Position: Sitting   Cuff Size: Large Adult   Pulse: 97   Temp: 97.7 °F (36.5 °C)   TempSrc: Oral   SpO2: 94%   Weight: 220 lb (99.8 kg)   Height: 5' 1\" (1.549 m)      Body mass index is 41.57 kg/m². General Appearance: alert and oriented to person, place and time, well developed and well- nourished, in no acute distress  Skin: warm and dry, no rash or erythema  Head: normocephalic and atraumatic  Eyes: pupils equal, round, and reactive to light, extraocular eye movements intact, conjunctivae normal  ENT: tympanic membrane, external ear and ear canal normal bilaterally, nose without deformity, nasal mucosa and turbinates normal without polyps  Neck: supple and non-tender without mass, no thyromegaly or thyroid nodules, no cervical lymphadenopathy  Pulmonary/Chest: clear to auscultation bilaterally- no wheezes, rales or rhonchi, normal air movement, no respiratory distress  Cardiovascular: normal rate, regular rhythm, normal S1 and S2, no murmurs, rubs, clicks, or gallops, distal pulses intact, no carotid bruits  Abdomen: soft, non-tender, non-distended, normal bowel sounds, no masses or organomegaly  Extremities: no cyanosis, clubbing or edema  Musculoskeletal: normal range of motion, no joint swelling, deformity or tenderness  Neurologic: reflexes normal and symmetric, no cranial nerve deficit, gait, coordination and speech normal       Allergies   Allergen Reactions    Omeprazole Diarrhea    Sertraline Other (See Comments)     Suicidal ideation requiring hospitalization  Suicidal ideation requiring hospitalization  Suicidal ideation requiring hospitalization    Sulfa Antibiotics Nausea Only and Other (See Comments)     dizziness    Propofol Nausea And Vomiting     Pt notes along with nausea and vomiting she had difficulty waking up after surgery     Prior to Visit Medications    Medication Sig Taking?  Authorizing Provider   DULoxetine (CYMBALTA) 60 MG extended release capsule Take 1 capsule by mouth daily Yes RENARD Garcia   dilTIAZem (CARDIZEM CD) 120 MG extended release capsule Take 1 capsule by mouth daily Yes Yissel Valderrama MD   promethazine (PHENERGAN) 12.5 MG tablet Take 1 tablet by mouth every 8 hours as needed for Nausea Yes ERVIN Nava - EBEN   calcium carbonate (OS-SOHA) 1250 (500 Ca) MG chewable tablet Take 500 mg by mouth daily as needed Yes Historical Provider, MD   furosemide (LASIX) 40 MG tablet TAKE 1 TABLET BY MOUTH ONCE DAILY Yes Bernabe Cuenca MD   Esomeprazole Magnesium (NEXIUM PO) Take 20 mg by mouth daily  Yes Historical Provider, MD       CareTeam (Including outside providers/suppliers regularly involved in providing care):   Patient Care Team:  RENARD Dhaliwal as PCP - General (Physician Assistant)  RENARD Dhaliwal as PCP - [facility]  Mimi Camacho MD as Surgeon (Orthopedic Surgery)  Joi Ramirez MD as Consulting Physician (Pulmonology)     Reviewed and updated this visit:  Tobacco  Allergies  Meds  Problems  Med Hx  Surg Hx  Soc Hx  Fam Hx

## 2022-11-30 ENCOUNTER — TELEPHONE (OUTPATIENT)
Dept: FAMILY MEDICINE CLINIC | Age: 67
End: 2022-11-30

## 2022-11-30 NOTE — TELEPHONE ENCOUNTER
Submitted PA for Lidocaine 5% patches,  Key: [id number]. LIDOCAINE PAD 5% is approved through 12/31/2023. Please notify patient. Thank you.

## 2023-02-03 NOTE — PROGRESS NOTES
[address]   Cardiac Consultation    Referring Provider:  RENARD Quintana     Chief Complaint   Patient presents with    3 Month Follow-Up    Hypertension    Palpitations       Originally saw 5/20/22  Subjective: Ms. Monroe Acosta is here today to for routine f/u/ c/o chronic SOB and fatigue     History of Present Illness:  Gerardo Scott is a 69 y.o. female who has PMH of HTN, tobacco abuse (quit 2010--1ppd prior), and Fibromyalgia. Most recent GXT stress ECHO 11/12/14 Negative ECG for ischemia with graded exercise test. Duke Treadmill Score is 5 which indicates low risk. Negative stress echo with no indication of inducible ischemia. Most recent EKG 3/31/22 SR 97 bpm; NST change. Most recent 48 hour monitor 5/20-5/23/22 Predominately NSR, PAC's 0.02%, PVC 0.01%. Most recent ECHO 6/15/22 LVEF>65%. mild cLVH. Grade I DD. Mild MAC; Trace to mild MR. Trace TR. Elevated heart rate throughout study. Dr. Candace Spears ordered PFT 9/21/2022 noted borderline mild restrictive lung defect and 6 MWT which was normal without hypoxia walking. He ordered Chest CT 10/18/22 negative for ILD; steatosis; nonsp 3.5cm liver mass. F/U CT imaging 11/22 demonstrated mass likely represented area of focal fat. Last note reported no concerning issues on PFT's or CT imaging to explain SOB. 6MWT normal also. Today, she reports chronic sob with exertion and fatigue. This is about the same as last visit. She notes having intermittent palpitations. Denies chest pain or lightheadedness. She is compliant with medications. She tolerates well. Patient with no complaints of chest pain, dizziness, edema, or orthopnea/PND. Weight today is 224# (Up 8# from 10/11/2022)    Patient is vaccinated against Covid. Drake Cornejo 2/2 Covid+ 2021    Past Medical History:   has a past medical history of Colitis, Diverticulosis, MVP (mitral valve prolapse), and Spastic colon.     Surgical History:   has a past surgical history that includes Hysterectomy; Inner ear surgery; Carpal tunnel release; Appendectomy; Tonsillectomy; Breast enhancement surgery (1973); Upper gastrointestinal endoscopy (2017); Colonoscopy (2017); and joint replacement (Right). Social History:   reports that she has quit smoking in . Smoked 1ppd prior. She has never used smokeless tobacco. She reports that she does not drink alcohol and does not use drugs. She lives in [facility], she is not . She had 1 daughter. Quit smoking in . Smoked 1 pack a day. Denies alcohol and drugs    Family History:  family history includes Cancer in her sister; Heart Disease in her father, maternal grandfather, and mother; High Blood Pressure in her father and mother. dad  at 80 of heart attack. Mom had heart problem that went undiagnosed and  from that. Home Medications:  Prior to Admission medications    Medication Sig Start Date End Date Taking? Authorizing Provider   DULoxetine (CYMBALTA) 60 MG extended release capsule Take 1 capsule by mouth daily 22  Yes RENARD Verduzco   dilTIAZem (CARDIZEM CD) 120 MG extended release capsule Take 1 capsule by mouth daily 10/11/22  Yes Aurelia Mora MD   furosemide (LASIX) 40 MG tablet TAKE 1 TABLET BY MOUTH ONCE DAILY 22  Yes Aurelia Mora MD   Esomeprazole Magnesium (NEXIUM PO) Take 20 mg by mouth daily    Yes Historical Provider, MD   promethazine (PHENERGAN) 12.5 MG tablet Take 1 tablet by mouth every 8 hours as needed for Nausea  Patient not taking: Reported on 2023   ERVIN Hill CNP   calcium carbonate (OS-SOHA) 1250 (500 Ca) MG chewable tablet Take 500 mg by mouth daily as needed  Patient not taking: Reported on 2023    Historical Provider, MD        Allergies:  Omeprazole, Sertraline, Sulfa antibiotics, and Propofol     Review of Systems:   Constitutional: there has been no unanticipated weight loss. There's been no change in energy level, sleep pattern, or activity level. Eyes: No visual changes or diplopia. No scleral icterus. ENT: No Headaches, hearing loss or vertigo. No mouth sores or sore throat. Cardiovascular: Reviewed in HPI  Respiratory: No cough or wheezing, no sputum production. No hematemesis. Gastrointestinal: No abdominal pain, appetite loss, blood in stools. No change in bowel or bladder habits. Genitourinary: No dysuria, trouble voiding, or hematuria. Musculoskeletal:  No gait disturbance, weakness or joint complaints. Integumentary: No rash or pruritis. Neurological: No headache, diplopia, change in muscle strength, numbness or tingling. No change in gait, balance, coordination, mood, affect, memory, mentation, behavior. Psychiatric: No anxiety, no depression. Endocrine: No malaise, fatigue or temperature intolerance. No excessive thirst, fluid intake, or urination. No tremor. Hematologic/Lymphatic: No abnormal bruising or bleeding, blood clots or swollen lymph nodes. Allergic/Immunologic: No nasal congestion or hives. Physical Examination:    Vitals:    02/06/23 1235   BP: (!) 147/86   Pulse: (!) 104   SpO2: 98%          Constitutional and General Appearance: NAD   Respiratory:  Normal excursion and expansion without use of accessory muscles  Resp Auscultation: Clear, no crackles or wheezes   Cardiovascular: The apical impulses not displaced  Heart tones are crisp and normal  Cervical veins are not engorged  The carotid upstroke is normal in amplitude and contour without delay or bruit  Normal S1S2, No S3, No Murmur  Peripheral pulses are symmetrical and full  There is no clubbing, cyanosis of the extremities.   Trace  BLE edema  Femoral Arteries: 2+ and equal  Pedal Pulses: 2+ and equal   Abdomen:  No masses or tenderness  Liver/Spleen: No Abnormalities Noted  Neurological/Psychiatric:  Alert and oriented in all spheres  Moves all extremities well  Exhibits normal gait balance and coordination  No abnormalities of mood, affect, memory, mentation, or behavior are noted  Skin:  Skin: warm and dry. LABS  Lab Results   Component Value Date     05/05/2022    K 4.2 05/05/2022     05/05/2022    CO2 24 05/05/2022    BUN 18 05/05/2022    CREATININE 0.7 11/07/2022    GLUCOSE 93 05/05/2022    CALCIUM 9.7 05/05/2022    PROT 7.1 08/23/2016    LABALBU 4.7 05/05/2022    BILITOT 0.3 04/01/2022    ALKPHOS 93 04/01/2022    AST 24 04/01/2022    ALT 35 04/01/2022    LABGLOM >60 11/07/2022    GFRAA >60 05/05/2022    AGRATIO 1.6 08/23/2016    GLOB 2.7 08/23/2016     Lab Results   Component Value Date    WBC 5.8 04/01/2022    HGB 13.9 04/01/2022    HCT 42.4 04/01/2022    MCV 85 04/01/2022     04/01/2022     Lab Results   Component Value Date    TSH 2.900 04/01/2022     Hemoglobin A1C   Date Value Ref Range Status   04/01/2022 6.2 % Final         Lab Results   Component Value Date    CHOL 164 04/01/2022    CHOL 183 08/23/2016     Lab Results   Component Value Date    TRIG 106 04/01/2022    TRIG 120 08/23/2016     Lab Results   Component Value Date    HDL 55 04/01/2022    HDL 63 (H) 08/23/2016     Lab Results   Component Value Date    LDLCALC 90 04/01/2022    LDLCALC 96 08/23/2016     Lab Results   Component Value Date    LABVLDL 24 08/23/2016    VLDL 19 04/01/2022     No results found for: CHOLHDLRATIO    5/20/22 BNP 32    Assessment:     1. Essential hypertension: Suboptimal BP control and will need adjustment of antihypertensive medical regimen. Increase cardizem to 240mg qd.     2. Abnormal EKG:  Most recent EKG 3/31/22 SR 97 bpm; NST change. I do not see concerning findings on this study. 3. SOB (shortness of breath) on exertion: Need concern for HFpEF vs CAD.  Timbokerline Alvarez has done pulm w/u and cannot find pulm reason for SOB. I believe RHC/LHC now best performed to assess cors, right heart/pulm pressures. Possible HFpEF given female, older, and obese.  I recommend left and right heart cath for definitive evaluation of coronary arteries, pulm/right heart pressues. Risks, benefits, expectations, and alternative treatments were discussed. Questions appropriately answered. Maged Kennedy agrees to proceed and verbalized understanding. Note low BNP in May 2022 but can be normal in HFpEF. 4. LE edema: See #3 above. Improved with lasix and will continue 40mg daily. 5. Tachycardia: Mildly tachycardic. Note SYED in May 2022 without significant arrhythmia. Avg HR 96bpm; rare PAC's/PVC's. Increasing cardizem for HR and BP to 240mg daily. Plan:  1. Current medications reviewed. No refills given as warranted. 2. Recommend RHC/LHC to assess cardiac cause for sob. Risks and benefits discussed. Will proceed. Plan to follow up after cath. This note was scribed in the presence of Bebe Bradley MD by Ermelinda Kerr RN. I, Dr. Molly Singh, personally performed the services described in this documentation, as scribed by the above signed scribe in my presence. It is both accurate and complete to my knowledge. I agree with the details independently gathered by the clinical support staff, while the remaining scribed note accurately describes my personal service to the patient. Cost of prescription medications and patient compliance have been reviewed with patient. All questions answered. Thank you for allowing me to participate in the care of this individual.    Darryl Doshi.  Oksana Cranker, M.D., [facility]

## 2023-02-06 ENCOUNTER — TELEPHONE (OUTPATIENT)
Dept: CARDIOLOGY CLINIC | Age: 68
End: 2023-02-06

## 2023-02-06 ENCOUNTER — OFFICE VISIT (OUTPATIENT)
Dept: CARDIOLOGY CLINIC | Age: 68
End: 2023-02-06
Payer: MEDICARE

## 2023-02-06 VITALS
SYSTOLIC BLOOD PRESSURE: 147 MMHG | BODY MASS INDEX: 41.22 KG/M2 | HEIGHT: 62 IN | HEART RATE: 104 BPM | DIASTOLIC BLOOD PRESSURE: 86 MMHG | WEIGHT: 224 LBS | OXYGEN SATURATION: 98 %

## 2023-02-06 DIAGNOSIS — R94.31 ABNORMAL EKG: ICD-10-CM

## 2023-02-06 DIAGNOSIS — R53.82 CHRONIC FATIGUE: ICD-10-CM

## 2023-02-06 DIAGNOSIS — R93.1 ABNORMAL FINDINGS ON DIAGNOSTIC IMAGING OF HEART AND CORONARY CIRCULATION: ICD-10-CM

## 2023-02-06 DIAGNOSIS — R06.02 SOB (SHORTNESS OF BREATH) ON EXERTION: Primary | ICD-10-CM

## 2023-02-06 DIAGNOSIS — Z87.891 HISTORY OF TOBACCO ABUSE: ICD-10-CM

## 2023-02-06 PROCEDURE — 3077F SYST BP >= 140 MM HG: CPT | Performed by: INTERNAL MEDICINE

## 2023-02-06 PROCEDURE — 1123F ACP DISCUSS/DSCN MKR DOCD: CPT | Performed by: INTERNAL MEDICINE

## 2023-02-06 PROCEDURE — 3079F DIAST BP 80-89 MM HG: CPT | Performed by: INTERNAL MEDICINE

## 2023-02-06 PROCEDURE — 99214 OFFICE O/P EST MOD 30 MIN: CPT | Performed by: INTERNAL MEDICINE

## 2023-02-06 RX ORDER — DILTIAZEM HYDROCHLORIDE 240 MG/1
240 CAPSULE, COATED, EXTENDED RELEASE ORAL DAILY
Qty: 90 CAPSULE | Refills: 3 | Status: SHIPPED | OUTPATIENT
Start: 2023-02-06

## 2023-02-06 NOTE — PATIENT INSTRUCTIONS
Plan:  1. Current medications reviewed. No refills given as warranted. 2. Recommend RHC/LHC to assess cardiac cause for sob. Risks and benefits discussed. Will proceed. Plan to follow up after cath.

## 2023-02-09 ENCOUNTER — TELEMEDICINE (OUTPATIENT)
Dept: PULMONOLOGY | Age: 68
End: 2023-02-09
Payer: MEDICARE

## 2023-02-09 DIAGNOSIS — F17.200 SMOKING: ICD-10-CM

## 2023-02-09 DIAGNOSIS — R06.02 SOB (SHORTNESS OF BREATH): Primary | ICD-10-CM

## 2023-02-09 PROCEDURE — 1123F ACP DISCUSS/DSCN MKR DOCD: CPT | Performed by: INTERNAL MEDICINE

## 2023-02-09 PROCEDURE — 99214 OFFICE O/P EST MOD 30 MIN: CPT | Performed by: INTERNAL MEDICINE

## 2023-02-09 NOTE — PROGRESS NOTES
P  [facility]                                               Pulmonary Clinic Consult     I had the pleasure of seeing  Kiley Malin     Chief Complaint   Patient presents with    Follow-up     3 month f/u ILD       HISTORY OF PRESENT ILLNESS:    Kiley Malin is a 69y.o. year old  Who  has 30 PPY smoking history     She  quit smoking 10 years ,still vape once in while     The Patient comes in with SOB that has been going on the last 10  years  Associated with cough ,however it got worse with ambulation ,he  states that it get worse with exercise or walking long distance and he can walk . 1 mile .  And go 1 flight of stairs before get short winded    He/She  has cough ,productive for   Sputum and it is more in the   Had Covid 3 times   She had stress and   She has sleep apnea she states there is no way to put cpap   Today visit  She has been stable   SOB only when ambulate  Quit vaping   Gain 30 # last few years   Denies any CP  No hemoptysis  Still with VILLELA but not worse        ALLERGIES:    Allergies   Allergen Reactions    Omeprazole Diarrhea    Sertraline Other (See Comments)     Suicidal ideation requiring hospitalization  Suicidal ideation requiring hospitalization  Suicidal ideation requiring hospitalization    Sulfa Antibiotics Nausea Only and Other (See Comments)     dizziness    Propofol Nausea And Vomiting     Pt notes along with nausea and vomiting she had difficulty waking up after surgery       PAST MEDICAL HISTORY:       Diagnosis Date    Colitis     Diverticulosis     MVP (mitral valve prolapse)     Spastic colon        MEDICATIONS:   Current Outpatient Medications   Medication Sig Dispense Refill    dilTIAZem (CARDIZEM CD) 240 MG extended release capsule Take 1 capsule by mouth daily 90 capsule 3    DULoxetine (CYMBALTA) 60 MG extended release capsule Take 1 capsule by mouth daily 90 capsule 1    promethazine (PHENERGAN) 12.5 MG tablet Take 1 tablet by mouth every 8 hours as needed for Nausea 20 tablet 0    calcium carbonate (OS-SOHA) 1250 (500 Ca) MG chewable tablet Take 500 mg by mouth daily as needed      furosemide (LASIX) 40 MG tablet TAKE 1 TABLET BY MOUTH ONCE DAILY 90 tablet 3    Esomeprazole Magnesium (NEXIUM PO) Take 20 mg by mouth daily        No current facility-administered medications for this visit. 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 There has been no weight changes. No fevers, fatigue or weakness. Head: Patient denies any history of trauma, convulsive disorder or syncope. Skin:  Patient denies history of changes in pigmentation, eruptions or pruritus. No easy bruising or bleeding. EENT: no nasal congestion   Cardiovascular ,No chest pain ,No edema ,  Respiration:SOB: + ,VILLELA :+  Gastrointestinal:No GI bleed ,no melena  ,no hematemesis    Musculoskeletal: no joint pain ,no swelling  Neurological:no , syncope. Denies twitching, convulsions, loss of consciousness or memory. Endocrine:  . No history of goiter, exophthalmos or dryness of skin. The patient has no history of diabetes.     Hematopoietic:  No history of bleeding disorders or easy bruising. Rheumatic:  No connective tissue disease history or polyarthritis/inflammatory joint disease. PHYSICAL EXAMINATION:  There were no vitals filed for this visit. Video visit     DATA:   PFT restrictive pattern  Normal DLCO    IMPRESSION:    1-SOB   2-Possible MIRIAN,sleep study down the road if not lost weight in 3 months   3-recurrent COVID   4-High BMI              5- Liver mass followed by GI     PLAN:      -SOB ,unclear etiology ,seems decondition with weight gain and post covid  PFT/CT looks ok ,6 minutes walk is very good ,no hypoxia   -decline repeat sleep study or titration   -though CRP slightly high ,AMELIE,other test was ok ,follow by PCP  _ check IgE and eosnophilic count looks normal  She is going t    CT scan no UIP or fibrotic changes   Continue screen CT lung cancer screen in 8-9/2023  Twan Mallory was evaluated through a synchronous (real-time) audio-video encounter. The patient (or guardian if applicable) is aware that this is a billable service, which includes applicable co-pays. This Virtual Visit was conducted with patient's (and/or legal guardian's) consent. The visit was conducted pursuant to the emergency declaration under the S    Flu and Pneumovax as per primary     Thank you for allowing me to participate in Twan Mallory care. I will keep following with you ,should you have any concerns ,please contact us at [facility] pulmonary office     Sincerely,        Diaz Chambers MD  Pulmonary & Critical Care Medicine     NOTE: This report was transcribed using voice recognition software. Every effort was made to ensure accuracy; however, inadvertent computerized transcription errors may be present.

## 2023-02-13 NOTE — TELEPHONE ENCOUNTER
Spoke with patient. Patient is scheduled with Dr. Jose Lawson for Right and Left Heart Cath on 2/27/23 at [facility], arrival time of 7:30am to the Cath Lab. Please have patient arrive to the main entrance of [facility] and check in with the registration desk. RN's - Please call patient regarding medication instructions. Remind patient to be NPO after midnight (8 hours prior). Do not apply lotions/creams on skin the day of procedure. [facility] - eva only.

## 2023-02-13 NOTE — TELEPHONE ENCOUNTER
NPO after midnight  HOLD Lasix   Rest of meds okay with a sip of water  No lotion/cream    Patient aware and VU

## 2023-02-21 ENCOUNTER — HOSPITAL ENCOUNTER (OUTPATIENT)
Age: 68
Discharge: HOME | End: 2023-02-21
Payer: MEDICARE

## 2023-02-21 DIAGNOSIS — J84.9 ILD (INTERSTITIAL LUNG DISEASE) (HCC): ICD-10-CM

## 2023-02-21 LAB
EOSINOPHILS ABSOLUTE COUNT: 0.1 K/UL (ref 0–0.6)
SEDIMENTATION RATE, ERYTHROCYTE: 36 MM/HR (ref 0–30)

## 2023-02-21 PROCEDURE — 82785 ASSAY OF IGE: CPT

## 2023-02-21 PROCEDURE — 85652 RBC SED RATE AUTOMATED: CPT

## 2023-02-21 PROCEDURE — 85048 AUTOMATED LEUKOCYTE COUNT: CPT

## 2023-02-21 PROCEDURE — 36415 COLL VENOUS BLD VENIPUNCTURE: CPT

## 2023-02-21 PROCEDURE — 86038 ANTINUCLEAR ANTIBODIES: CPT

## 2023-02-22 LAB — ANTI-NUCLEAR ANTIBODY (ANA): NEGATIVE

## 2023-02-23 LAB — IGE: 21 KU/L

## 2023-02-24 ENCOUNTER — TELEPHONE (OUTPATIENT)
Dept: CARDIOLOGY CLINIC | Age: 68
End: 2023-02-24

## 2023-02-24 NOTE — TELEPHONE ENCOUNTER
Patient is scheduled to have a LHC on 2/28/23. Patient's insurance has denied the request for authorization. For peer to peer, please call [phone], select Opt #1 and refer to Case #[id number] when calling. If authorization is obtained, please provide.  Thanks

## 2023-02-28 ENCOUNTER — HOSPITAL ENCOUNTER (OUTPATIENT)
Dept: CARDIAC CATH/INVASIVE PROCEDURES | Age: 68
Discharge: HOME | End: 2023-02-28
Attending: INTERNAL MEDICINE | Admitting: INTERNAL MEDICINE
Payer: MEDICARE

## 2023-02-28 VITALS — BODY MASS INDEX: 41.13 KG/M2 | WEIGHT: 223.5 LBS | HEIGHT: 62 IN

## 2023-02-28 DIAGNOSIS — I50.31 ACUTE HEART FAILURE WITH PRESERVED EJECTION FRACTION (HCC): Primary | ICD-10-CM

## 2023-02-28 PROBLEM — R06.00 DYSPNEA: Status: ACTIVE | Noted: 2023-02-28

## 2023-02-28 LAB
ANION GAP SERPL CALCULATED.3IONS-SCNC: 12 MMOL/L (ref 3–16)
BUN BLDV-MCNC: 20 MG/DL (ref 7–20)
CALCIUM SERPL-MCNC: 10.2 MG/DL (ref 8.3–10.6)
CHLORIDE BLD-SCNC: 99 MMOL/L (ref 99–110)
CO2: 29 MMOL/L (ref 21–32)
CREAT SERPL-MCNC: 0.7 MG/DL (ref 0.6–1.2)
EKG ATRIAL RATE: 98 BPM
EKG DIAGNOSIS: NORMAL
EKG P AXIS: 46 DEGREES
EKG P-R INTERVAL: 138 MS
EKG Q-T INTERVAL: 364 MS
EKG QRS DURATION: 72 MS
EKG QTC CALCULATION (BAZETT): 464 MS
EKG R AXIS: 15 DEGREES
EKG T AXIS: 66 DEGREES
EKG VENTRICULAR RATE: 98 BPM
GFR SERPL CREATININE-BSD FRML MDRD: >60 ML/MIN/{1.73_M2}
GLUCOSE BLD-MCNC: 112 MG/DL (ref 70–99)
HCT VFR BLD CALC: 38.5 % (ref 36–48)
HEMOGLOBIN: 12.7 G/DL (ref 12–16)
MCH RBC QN AUTO: 28.2 PG (ref 26–34)
MCHC RBC AUTO-ENTMCNC: 33.1 G/DL (ref 31–36)
MCV RBC AUTO: 85.2 FL (ref 80–100)
PDW BLD-RTO: 14.2 % (ref 12.4–15.4)
PLATELET # BLD: 214 K/UL (ref 135–450)
PMV BLD AUTO: 7.8 FL (ref 5–10.5)
POTASSIUM SERPL-SCNC: 3.8 MMOL/L (ref 3.5–5.1)
RBC # BLD: 4.51 M/UL (ref 4–5.2)
SODIUM BLD-SCNC: 140 MMOL/L (ref 136–145)
WBC # BLD: 7.3 K/UL (ref 4–11)

## 2023-02-28 PROCEDURE — C1769 GUIDE WIRE: HCPCS

## 2023-02-28 PROCEDURE — 2500000003 HC RX 250 WO HCPCS

## 2023-02-28 PROCEDURE — 6360000002 HC RX W HCPCS

## 2023-02-28 PROCEDURE — 6370000000 HC RX 637 (ALT 250 FOR IP)

## 2023-02-28 PROCEDURE — 85027 COMPLETE CBC AUTOMATED: CPT

## 2023-02-28 PROCEDURE — 93005 ELECTROCARDIOGRAM TRACING: CPT | Performed by: INTERNAL MEDICINE

## 2023-02-28 PROCEDURE — 93460 R&L HRT ART/VENTRICLE ANGIO: CPT

## 2023-02-28 PROCEDURE — 2709999900 HC NON-CHARGEABLE SUPPLY

## 2023-02-28 PROCEDURE — 93010 ELECTROCARDIOGRAM REPORT: CPT | Performed by: INTERNAL MEDICINE

## 2023-02-28 PROCEDURE — C1894 INTRO/SHEATH, NON-LASER: HCPCS

## 2023-02-28 PROCEDURE — C1887 CATHETER, GUIDING: HCPCS

## 2023-02-28 PROCEDURE — 93460 R&L HRT ART/VENTRICLE ANGIO: CPT | Performed by: INTERNAL MEDICINE

## 2023-02-28 PROCEDURE — 80048 BASIC METABOLIC PNL TOTAL CA: CPT

## 2023-02-28 RX ORDER — SODIUM CHLORIDE 0.9 % (FLUSH) 0.9 %
5-40 SYRINGE (ML) INJECTION PRN
Status: DISCONTINUED | OUTPATIENT
Start: 2023-02-28 | End: 2023-02-28 | Stop reason: HOSPADM

## 2023-02-28 RX ORDER — MIDAZOLAM HYDROCHLORIDE 1 MG/ML
INJECTION INTRAMUSCULAR; INTRAVENOUS
Status: COMPLETED | OUTPATIENT
Start: 2023-02-28 | End: 2023-02-28

## 2023-02-28 RX ORDER — ATORVASTATIN CALCIUM 40 MG/1
40 TABLET, FILM COATED ORAL DAILY
Qty: 90 TABLET | Refills: 1 | Status: SHIPPED | OUTPATIENT
Start: 2023-02-28

## 2023-02-28 RX ORDER — SODIUM CHLORIDE 9 MG/ML
INJECTION, SOLUTION INTRAVENOUS PRN
Status: DISCONTINUED | OUTPATIENT
Start: 2023-02-28 | End: 2023-02-28 | Stop reason: HOSPADM

## 2023-02-28 RX ORDER — FENTANYL CITRATE 50 UG/ML
INJECTION, SOLUTION INTRAMUSCULAR; INTRAVENOUS
Status: COMPLETED | OUTPATIENT
Start: 2023-02-28 | End: 2023-02-28

## 2023-02-28 RX ORDER — SODIUM CHLORIDE 0.9 % (FLUSH) 0.9 %
5-40 SYRINGE (ML) INJECTION EVERY 12 HOURS SCHEDULED
Status: DISCONTINUED | OUTPATIENT
Start: 2023-02-28 | End: 2023-02-28 | Stop reason: HOSPADM

## 2023-02-28 RX ORDER — ASPIRIN 325 MG
325 TABLET ORAL ONCE
Status: COMPLETED | OUTPATIENT
Start: 2023-02-28 | End: 2023-02-28

## 2023-02-28 RX ORDER — ACETAMINOPHEN 325 MG/1
650 TABLET ORAL EVERY 4 HOURS PRN
Status: DISCONTINUED | OUTPATIENT
Start: 2023-02-28 | End: 2023-02-28 | Stop reason: HOSPADM

## 2023-02-28 RX ORDER — HEPARIN SODIUM 1000 [USP'U]/ML
INJECTION, SOLUTION INTRAVENOUS; SUBCUTANEOUS
Status: COMPLETED | OUTPATIENT
Start: 2023-02-28 | End: 2023-02-28

## 2023-02-28 RX ORDER — ASPIRIN 81 MG/1
81 TABLET ORAL DAILY
Qty: 90 TABLET | Refills: 1 | Status: SHIPPED | OUTPATIENT
Start: 2023-02-28

## 2023-02-28 RX ADMIN — MIDAZOLAM HYDROCHLORIDE 1 MG: 1 INJECTION INTRAMUSCULAR; INTRAVENOUS at 08:35

## 2023-02-28 RX ADMIN — HEPARIN SODIUM 5000 UNITS: 1000 INJECTION, SOLUTION INTRAVENOUS; SUBCUTANEOUS at 09:04

## 2023-02-28 RX ADMIN — Medication 325 MG: at 07:23

## 2023-02-28 RX ADMIN — FENTANYL CITRATE 50 MCG: 50 INJECTION, SOLUTION INTRAMUSCULAR; INTRAVENOUS at 09:22

## 2023-02-28 RX ADMIN — Medication 10 ML: at 07:24

## 2023-02-28 RX ADMIN — MIDAZOLAM HYDROCHLORIDE 1 MG: 1 INJECTION INTRAMUSCULAR; INTRAVENOUS at 08:53

## 2023-02-28 RX ADMIN — FENTANYL CITRATE 25 MCG: 50 INJECTION, SOLUTION INTRAMUSCULAR; INTRAVENOUS at 08:53

## 2023-02-28 RX ADMIN — FENTANYL CITRATE 25 MCG: 50 INJECTION, SOLUTION INTRAMUSCULAR; INTRAVENOUS at 08:35

## 2023-02-28 NOTE — H&P
Brief Pre-Op Note/Sedation Assessment      Yamel Fried  1955  [id number]  10:07 AM    Planned Procedure: Cardiac Catheterization Procedure  Post Procedure Plan: Return to same level of care  Consent: I have discussed with the patient and/or the patient representative the indication, alternatives, and the possible risks and/or complications of the planned procedure and the anesthesia methods. The patient and/or patient representative appear to understand and agree to proceed. Chief Complaint:   Exertional dyspnea, HFpEF    Indications for Cath Procedure:  Presentation:  Worsening Angina and Suspected CAD  2. Anginal Classification within 2 weeks:  CCS III - Symptoms with everyday living activities, i.e., moderate limitation  3. Angina Symptoms Assessment:  Atypical Chest Pain  4. Heart Failure Class within last 2 weeks:  Yes:  Heart Failure Type: Diastolic Severity:  Class III - Symptoms of HF on less-than-ordinary exertion  5. Cardiovascular Instability:  No    Prior Ischemic Workup/Eval:  Pre-Procedural Medications: Yes: Aspirin and Ca Channel Blockers  2. Stress Test Completed? No    Does Patient need surgery? Cath Valve Surgery:  No    Pre-Procedure Medical History:  Vital Signs:  Ht 5' 2\" (1.575 m)   Wt 223 lb 8 oz (101.4 kg)   BMI 40.88 kg/m²     Allergies:     Allergies   Allergen Reactions    Omeprazole Diarrhea    Sertraline Other (See Comments)     Suicidal ideation requiring hospitalization  Suicidal ideation requiring hospitalization  Suicidal ideation requiring hospitalization    Sulfa Antibiotics Nausea Only and Other (See Comments)     dizziness    Propofol Nausea And Vomiting     Pt notes along with nausea and vomiting she had difficulty waking up after surgery     Medications:    Current Facility-Administered Medications   Medication Dose Route Frequency Provider Last Rate Last Admin    sodium chloride flush 0.9 % injection 5-40 mL  5-40 mL IntraVENous 2 times per day Adrian Haddox, DO   10 mL at 02/28/23 0724    sodium chloride flush 0.9 % injection 5-40 mL  5-40 mL IntraVENous PRN Adrian Haddox, DO        0.9 % sodium chloride infusion   IntraVENous PRN Adrian Haddox, DO        sodium chloride flush 0.9 % injection 5-40 mL  5-40 mL IntraVENous 2 times per day Adrian Haddox, DO        sodium chloride flush 0.9 % injection 5-40 mL  5-40 mL IntraVENous PRN Adrian Haddox, DO        0.9 % sodium chloride infusion   IntraVENous PRN Adrian Haddox, DO        acetaminophen (TYLENOL) tablet 650 mg  650 mg Oral Q4H PRN Adrian Haddox, DO           Past Medical History:    Past Medical History:   Diagnosis Date    Colitis     Diverticulosis     MVP (mitral valve prolapse)     Spastic colon        Surgical History:    Past Surgical History:   Procedure Laterality Date    APPENDECTOMY      BREAST ENHANCEMENT SURGERY  1973    x3- all breast tissue removed then implants    CARPAL TUNNEL RELEASE      COLONOSCOPY  08/17/2017    polyps    HYSTERECTOMY (CERVIX STATUS UNKNOWN)      INNER EAR SURGERY      JOINT REPLACEMENT Right     Knee    TONSILLECTOMY      UPPER GASTROINTESTINAL ENDOSCOPY  08/17/2017             Pre-Sedation:  Pre-Sedation Documentation and Exam:  I have assessed the patient and reviewed the H&P on the chart. Prior History of Anesthesia Complications:   none    Modified Mallampati:  III (soft palate, base of uvula visible)    ASA Classification:  Class 3 - A patient with severe systemic disease that limits activity but is not incapacitating    Fidel Scale: Activity:  2 - Able to move 4 extremities voluntarily on command  Respiration:  2 - Able to breathe deeply and cough freely  Circulation:  2 - BP+/- 20mmHg of normal  Consciousness:  2 - Fully awake  Oxygen Saturation (color):  2 - Able to maintain oxygen saturation >92% on room air    Sedation/Anesthesia Plan:  Guard the patient's safety and welfare. Minimize physical discomfort and pain.   Minimize negative psychological responses to treatment by providing sedation and analgesia and maximize the potential amnesia. Patient to meet pre-procedure discharge plan.     Medication Planned:  midazolam intravenously and fentanyl intravenously    Patient is an appropriate candidate for plan of sedation:   yes      Electronically signed by Antonia Brower DO on 2/28/2023 at 10:07 AM

## 2023-02-28 NOTE — PROCEDURES
CARDIAC CATHETERIZATION REPORT    Date of Procedure: 2/28/2023  : Jennifer Brower DO  Primary Indication: Exertional dyspnea, HFpEF    Procedures Performed:  1. Coronary angiography  2. Left heart catheterization  3. Right heart catheterization  4. Right brachial venography  5. Moderate conscious sedation    Procedural Details:  Access: Local anesthetic was given and access was obtained in the right radial artery using a micropuncture technique and a 6F Terumo Slender Sheath was placed without difficulty. A 5F Terumo Slender Sheath was placed in the right brachial vein via wire exchange of an existing IV. Diagnostic: A 5F Marck Letters catheter was used to perform the right heart catheterization. 5F TIG catheter and 5F JL3.5 catheter were used to perform selective right and left coronary angiography, respectively. The 5F TIG catheter was used to perform the left heart catheterization. No significant gradient was observed on pull-back of the catheter across the aortic valve. Hemostasis: At the end of the procedure, the radial sheath was removed and a hemoband was placed over the arteriotomy site and filled with air to maintain hemostasis. The venous sheath was removed and manual pressure applied to maintain hemostasis. Findings:  Hemodynamics:  A. Right heart catheterization                   1. RA: 13 mmHg                   2. RV: 34/14 mmHg                   3. PA: 33/20 (25) mmHg                   4. PCWP: 17 mmHg                   5. Saturations: AO 91%, RA 66%, PA 65%                   6. Rosie CO: 6.17 L/min                   7. Rosie CI: 3.08 L/min*m2                   8. Rosie SVR: 972 dyne*sec/cm5                   9. Rosie PVR: 104 dyne*sec/cm5     B. Opening arterial pressure: 122/72 (88) mmHg      C. LVEDP: 17 mmHg    2. Coronary anatomy:  A. Left main artery: The left main artery bifurcates into the left anterior descending artery and left circumflex artery.   The left main artery has no angiographically significant disease. B. Left anterior descending artery: Transapical vessel which gives rise to one large diagonal artery. The LAD has a 30% ostial stenosis. The remainder of the vessel has minor luminal irregularities. The diagonal artery has a 20-30% proximal/mid-vessel stenosis. C. Left circumflex artery: Non-dominant vessel that gives rise to 2 obtuse marginal arteries. The Lcx has minor luminal irregularities. The OM1 is a small vessel with a high origin and minor luminal irregularities. The OM2 is a large branching vessel with a 30% proximal stenosis. The remainder of the vessel has minor luminal irregularities. D. Right coronary artery: Dominant vessel that gives rise to the posterior descending artery and posterolateral branch. The RCA has a 20% proximal stenosis and 30% mid-vessel stenosis. The RPDA has a 20-30% mid-vessel stenosis. The RPLB has minor luminal irregularities. 3. Right branchial venography:  A. The right brachial vein is patent with mild-moderate disease in the distal vessel. Technical Factors:  Complications:  None. Estimated blood loss: Minimal.  Radiation:  Air kerma 847 mGy and 9.4 minutes of fluoroscopy  Sedation: Moderate conscious sedation was administered by qualified nursing personnel under continuous hemodynamic monitoring, starting at 8:36 AM and ending at 9:25 AM.  Medications: 2.5 mg IA verapamil, 2 mg IV Versed, 100 mcg IV Fentanyl, 5,000 units IV heparin  Contrast: 60 cc of Isovue     Impression:  1. Mild non-obstructive coronary artery disease. 2. Mildly elevated left-sided cardiac filling pressures. 3. Moderately elevated right-sided cardiac filling pressures. 4. Borderline mild pulmonary hypertension with mean PAP 25 mmHg. 5. Normal Rosie cardiac output and index. Plan:  1. Increase lasix to 40 mg qAM and 20 mg qPM for the next 3 days and then return to taking 40 mg daily thereafter.   2. Start aspirin 81 mg daily and atorvastatin 40 mg daily.  3. Continue diltiazem  mg daily.  4. Follow-up with [facility] in 2 weeks.      Adrian Brower DO  [facility]

## 2023-02-28 NOTE — DISCHARGE INSTRUCTIONS
Please increase lasix to 40 mg (one full tablet) in the morning and 20 mg (one-half tablet) in the afternoon for 3 days and then return to taking 40 mg once daily thereafter. Take the attached lab slips and have a BMP and magnesium level checked at a [facility] in one week. Follow-up with Dr. Corinna Moore in 2 weeks. FOLLOW-UP APPOINTMENTS    [facility] OFFICE - Follow-up appointment on 3/15/2023 at 10:30 AM with Gearl Cuff, CNP Please arrive 15 minutes early to complete necessary paperwork. [facility] Office [address]:  [phone]. If you are unable to make this appointment, please call to reschedule [phone]. To get to the office go to the side of the hospital at [facility], enter at the [facility], entrance that faces [address]. Take the elevator to the 3rd floor turn right off elevator then take hallway to the right. Go down the medel and office will be on your right [address]. Cath Labs at  [facility]   Discharge Instructions        2/28/2023  Abisai Parham   Date of Birth 1955       Activity:  No driving for 24 hours. In 24 hours you may remove dressing and shower, wash site gently with soap and water and leave open to air  Avoid submerging your arm in sitting water for 5 days. Do not use your right hand for 24 hours, then  No lifting more than 5 pounds for 5 days. No lotions, powders, or ointments near site for 5 days. No work/school for 5 days unless instructed otherwise by your cardiologist.    Diet:   Resume previous diet, if a cardiac diet is specified you will receive a handout with  general guidelines. Drink extra non-alcoholic/decaffienated fluids for first 24 hours after your procedure.     Arm Management:  If bleeding occurs from the site or a hematoma (lump) begins to increase in size, apply pressure directly over the site, call 911 to return to the hospital.    Special Instructions:  Report any coolness or numbness in the arm  Report any chills, fever, itching, red bumps or rash   Report any of the following to the MD: drainage from the site, redness and/or swelling at the site, increased tenderness at the site   If you are currently taking Metformin or Metformin combination medications for Diabetes, hold your dose for 48 hours after your procedure. Consult your Cardiologist before taking any NSAIDS, vitamin supplements, estrogen, or estrogen plus progestin. Do not stop taking Plavix, Brilinta or Effient, without first consulting your cardiologist.    Sedation Discharge Instructions: For the next 24 hours do not drive a car, operate machinery, power tools or kitchen appliances. Do not drink alcohol; including beer or wine. Do not make any important decisions or sign any important papers. For the next 24 hours you can expect drowsiness, light-headed or dizziness, nausea/ vomiting, inability to concentrate, fatigue and desire to sleep. We strongly suggest that a responsible adult be with for the next 24 hours, for your protection and safety. You are not allowed to drive yourself home, or take any type of public transportation. Cardiac Rehab: If your doctor recommends Cardiac rehab, you will receive a call from that department to schedule your 1st appointment. Your Care Instructions  There are many things that cause chest pain. Some are not serious and will get better on their own in a few days. But some kinds of chest pain need more testing and treatment. Your doctor may have recommended follow-up visit in the next 4 to 8 weeks. If you are not feeling better, you may need more tests or treatment. Even though your doctor has released you, you still need to watch for any problems. The doctor carefully checked you, but sometimes problems can develop later. If you have new symptoms or if your symptoms do not improve, get medical help right away.  If you have worse or different chest pain or pressure that lasts more than 5 minutes or you passed out (lost consciouness), call 911 or seek other emergency help right away. A medical visit is only one step in your treatment. Even if you feel better, you still need to do what your doctor recommends, such as going to all suggested follow-up appointments and taking medications exactly as directed. This will help you recover and help prevent future problems. How can you care for yourself at home? Rest until you feel better. Take your medicine exactly as prescribed. Call your doctor if you think you are having problems with your medicine. Do not drive after taking a prescription pain medication. When should you call for help? Call 911 if: You passed out (lost consciousness). You have severe difficulty breathing. You have symptoms of a heart attack. These may include:  Chest pain or pressure, or a strange feeling in your chest.  Sweating. Shortness of breath. Nausea or vomiting. Pain, pressure, or a strange feeling in your back, neck or jaw, or upper belly or in one or both shoulders or arms. Lightheadedness or sudden weakness. A fast or irregular heartbeat. After you call 911, the  may tell you to chew 1 adult-strength or 2 to 4                  low-dose aspirin. Wait for an ambulance. Do not try to drive yourself. Call your doctor today if :  You have any trouble breathing. Your chest pain gets worse. You are dizzy or lightheaded, or you feel like you may faint. You are not getting better as expected. You are having new or different chest pain.

## 2023-03-09 ENCOUNTER — HOSPITAL ENCOUNTER (OUTPATIENT)
Age: 68
Discharge: HOME | End: 2023-03-09
Payer: MEDICARE

## 2023-03-09 LAB
A/G RATIO: 1.3 (ref 1.1–2.2)
ALBUMIN SERPL-MCNC: 4 G/DL (ref 3.4–5)
ALP BLD-CCNC: 106 U/L (ref 40–129)
ALT SERPL-CCNC: 28 U/L (ref 10–40)
ANION GAP SERPL CALCULATED.3IONS-SCNC: 12 MMOL/L (ref 3–16)
AST SERPL-CCNC: 20 U/L (ref 15–37)
BASOPHILS ABSOLUTE: 0 K/UL (ref 0–0.2)
BASOPHILS RELATIVE PERCENT: 0.6 %
BILIRUB SERPL-MCNC: 0.3 MG/DL (ref 0–1)
BUN BLDV-MCNC: 16 MG/DL (ref 7–20)
CALCIUM SERPL-MCNC: 9.6 MG/DL (ref 8.3–10.6)
CHLORIDE BLD-SCNC: 101 MMOL/L (ref 99–110)
CO2: 26 MMOL/L (ref 21–32)
CREAT SERPL-MCNC: 0.7 MG/DL (ref 0.6–1.2)
EOSINOPHILS ABSOLUTE: 0.1 K/UL (ref 0–0.6)
EOSINOPHILS RELATIVE PERCENT: 2 %
GFR SERPL CREATININE-BSD FRML MDRD: >60 ML/MIN/{1.73_M2}
GLUCOSE BLD-MCNC: 124 MG/DL (ref 70–99)
HCT VFR BLD CALC: 37.6 % (ref 36–48)
HEMOGLOBIN: 12.5 G/DL (ref 12–16)
INR BLD: 1.07 (ref 0.87–1.14)
LYMPHOCYTES ABSOLUTE: 1.6 K/UL (ref 1–5.1)
LYMPHOCYTES RELATIVE PERCENT: 22.7 %
MCH RBC QN AUTO: 28.3 PG (ref 26–34)
MCHC RBC AUTO-ENTMCNC: 33.3 G/DL (ref 31–36)
MCV RBC AUTO: 85 FL (ref 80–100)
MONOCYTES ABSOLUTE: 0.4 K/UL (ref 0–1.3)
MONOCYTES RELATIVE PERCENT: 5.4 %
NEUTROPHILS ABSOLUTE: 4.8 K/UL (ref 1.7–7.7)
NEUTROPHILS RELATIVE PERCENT: 69.3 %
PDW BLD-RTO: 14.8 % (ref 12.4–15.4)
PLATELET # BLD: 235 K/UL (ref 135–450)
PMV BLD AUTO: 8.2 FL (ref 5–10.5)
POTASSIUM SERPL-SCNC: 3.9 MMOL/L (ref 3.5–5.1)
PROTHROMBIN TIME: 13.7 SEC (ref 11.7–14.5)
RBC # BLD: 4.42 M/UL (ref 4–5.2)
SODIUM BLD-SCNC: 139 MMOL/L (ref 136–145)
TOTAL PROTEIN: 7.2 G/DL (ref 6.4–8.2)
WBC # BLD: 6.9 K/UL (ref 4–11)

## 2023-03-09 PROCEDURE — 80053 COMPREHEN METABOLIC PANEL: CPT

## 2023-03-09 PROCEDURE — 82105 ALPHA-FETOPROTEIN SERUM: CPT

## 2023-03-09 PROCEDURE — 36415 COLL VENOUS BLD VENIPUNCTURE: CPT

## 2023-03-09 PROCEDURE — 85025 COMPLETE CBC W/AUTO DIFF WBC: CPT

## 2023-03-09 PROCEDURE — 85610 PROTHROMBIN TIME: CPT

## 2023-03-11 LAB — AFP: 4.1 UG/L

## 2023-03-15 ENCOUNTER — OFFICE VISIT (OUTPATIENT)
Dept: CARDIOLOGY CLINIC | Age: 68
End: 2023-03-15
Payer: MEDICARE

## 2023-03-15 VITALS
WEIGHT: 220.4 LBS | HEART RATE: 90 BPM | SYSTOLIC BLOOD PRESSURE: 123 MMHG | BODY MASS INDEX: 40.56 KG/M2 | DIASTOLIC BLOOD PRESSURE: 79 MMHG | OXYGEN SATURATION: 93 % | HEIGHT: 62 IN

## 2023-03-15 DIAGNOSIS — I50.32 CHRONIC DIASTOLIC CHF (CONGESTIVE HEART FAILURE) (HCC): Primary | ICD-10-CM

## 2023-03-15 DIAGNOSIS — I25.10 CORONARY ARTERY DISEASE INVOLVING NATIVE CORONARY ARTERY OF NATIVE HEART WITHOUT ANGINA PECTORIS: ICD-10-CM

## 2023-03-15 PROCEDURE — 99214 OFFICE O/P EST MOD 30 MIN: CPT | Performed by: NURSE PRACTITIONER

## 2023-03-15 PROCEDURE — 1123F ACP DISCUSS/DSCN MKR DOCD: CPT | Performed by: NURSE PRACTITIONER

## 2023-03-15 PROCEDURE — 3074F SYST BP LT 130 MM HG: CPT | Performed by: NURSE PRACTITIONER

## 2023-03-15 PROCEDURE — 3078F DIAST BP <80 MM HG: CPT | Performed by: NURSE PRACTITIONER

## 2023-03-15 RX ORDER — SPIRONOLACTONE 25 MG/1
25 TABLET ORAL DAILY
Qty: 30 TABLET | Refills: 3 | Status: SHIPPED | OUTPATIENT
Start: 2023-03-15

## 2023-03-15 ASSESSMENT — ENCOUNTER SYMPTOMS
SHORTNESS OF BREATH: 1
GASTROINTESTINAL NEGATIVE: 1

## 2023-03-15 NOTE — PATIENT INSTRUCTIONS
Start spironolactone 25 mg daily   Continue other medications  Blood work in 2 weeks  Continued weight loss and exercise  Follow up in 1-2 months with me and 4 months with Dr. Marsha Polanco

## 2023-03-15 NOTE — PROGRESS NOTES
[address]   Cardiology Note              Date:  March 15, 2023  Patientname: Kriss Hinton  YOB: 1955    Primary Care physician: RENARD Carrero    HISTORY OF PRESENT ILLNESS: Kriss Hinton is a 69 y.o. female with a history of mild CAD, HFpEF, HTN, fibromyalgia. Cardiac monitor 5/2022 showed SR with PACs/PVCs. Echo 6/2022 showed EF>65%, mild LVH. Due to ongoing shortness of breath, [facility] 2/28/2023 showed mild CAD, elevated filling pressures, lasix temporarily increased. Today she presents for hospital follow up for CAD s/p LHC. She states edema and shortness of breath improved somewhat on increased lasix, though she still has dyspnea on exertion. She has no chest pain, dizziness or syncope. She has lost about 4 lbs. Office weight today 3/15/2023: 220 lbs  Office weight 2/6/2023: 224 lbs    Cardiologist: Dr. Ned Corey 2/2023    Past Medical History:   has a past medical history of Colitis, Diverticulosis, MVP (mitral valve prolapse), and Spastic colon. Past Surgical History:   has a past surgical history that includes Hysterectomy; Inner ear surgery; Carpal tunnel release; Appendectomy; Tonsillectomy; Breast enhancement surgery (1973); Upper gastrointestinal endoscopy (08/17/2017); Colonoscopy (08/17/2017); and joint replacement (Right). Home Medications:    Prior to Admission medications    Medication Sig Start Date End Date Taking?  Authorizing Provider   aspirin EC 81 MG EC tablet Take 1 tablet by mouth daily 2/28/23   Adrian Brower, DO   atorvastatin (LIPITOR) 40 MG tablet Take 1 tablet by mouth daily 2/28/23   Adrian Mariex, DO   dilTIAZem (CARDIZEM CD) 240 MG extended release capsule Take 1 capsule by mouth daily 2/6/23   Cyndee Sanchez MD   DULoxetine (CYMBALTA) 60 MG extended release capsule Take 1 capsule by mouth daily 11/21/22   RENARD Carrero   promethazine (PHENERGAN) 12.5 MG tablet Take 1 tablet by mouth every 8 hours as needed for Nausea 9/14/22   Annmarie Miller Kimberly Singh, APRN - CNP   calcium carbonate (OS-SOHA) 1250 (500 Ca) MG chewable tablet Take 500 mg by mouth daily as needed    Historical Provider, MD   furosemide (LASIX) 40 MG tablet TAKE 1 TABLET BY MOUTH ONCE DAILY 5/20/22   Delano Chin MD   Esomeprazole Magnesium (NEXIUM PO) Take 20 mg by mouth daily     Historical Provider, MD     Allergies:  Omeprazole, Sertraline, Sulfa antibiotics, and Propofol    Social History:   reports that she quit smoking about 21 months ago. Her smoking use included cigarettes. She has a 32.00 pack-year smoking history. She has never used smokeless tobacco. She reports that she does not drink alcohol and does not use drugs. Family History: family history includes Cancer in her sister; Heart Disease in her father, maternal grandfather, and mother; High Blood Pressure in her father and mother. Review of Systems   Review of Systems   Constitutional: Negative. Respiratory:  Positive for shortness of breath. Cardiovascular:  Positive for leg swelling. Negative for chest pain and palpitations. Gastrointestinal: Negative. Neurological: Negative. OBJECTIVE:    Vital signs:    /79   Pulse 90   Ht 5' 2\" (1.575 m)   Wt 220 lb 6.4 oz (100 kg)   SpO2 93%   BMI 40.31 kg/m²      Physical Exam:  Constitutional:  Comfortable and alert, NAD, appears stated age  Eyes: PERRL, sclera nonicteric  Neck:  Supple, no masses, no thyroidmegaly, no JVD  Skin:  Warm and dry; no rash or lesions  Heart:  Regular, normal apex, S1 and S2 normal, no M/G/R  Lungs:  Normal respiratory effort; clear; no wheezing/rhonchi/rales  Abdomen: soft, non tender, + bowel sounds  Extremities:  No edema or cyanosis; no clubbing  Neuro: alert and oriented, moves legs and arms equally, normal mood and affect  Right radial site soft, no hematoma, 2+ pulse    Data Reviewed:      Echo 6/2022:  Technically difficult examination.    LV systolic function is increased (hyperdynamic) with EF estimated >65%.   No regional wall motion abnormalities are seen. Normal left ventricle size with mild concentric LV hypertrophy. Grade I diastolic dysfunction with normal filling pressure. Mild mitral annular calcification. Trace to mild mitral regurgitation. Trace tricuspid valve regurgitation. Inadequate tricuspid regurgitation to estimate systolic pulmonary artery   pressure. Elevated heart rate throughout study. Coronary angiogram 2/28/2023:  Primary Indication: Exertional dyspnea, HFpEF  Procedures Performed:  1. Coronary angiography  2. Left heart catheterization  3. Right heart catheterization  4. Right brachial venography  5. Moderate conscious sedation  Procedural Details:  Access: Local anesthetic was given and access was obtained in the right radial artery using a micropuncture technique and a 6F Terumo Slender Sheath was placed without difficulty. A 5F Terumo Slender Sheath was placed in the right brachial vein via wire exchange of an existing IV. Diagnostic: A 5F Lisa Baston catheter was used to perform the right heart catheterization. 5F TIG catheter and 5F JL3.5 catheter were used to perform selective right and left coronary angiography, respectively. The 5F TIG catheter was used to perform the left heart catheterization. No significant gradient was observed on pull-back of the catheter across the aortic valve. Hemostasis: At the end of the procedure, the radial sheath was removed and a hemoband was placed over the arteriotomy site and filled with air to maintain hemostasis. The venous sheath was removed and manual pressure applied to maintain hemostasis. Findings:  Hemodynamics:  A.  Right heart catheterization                   1. RA: 13 mmHg                   2. RV: 34/14 mmHg                   3. PA: 33/20 (25) mmHg                   4. PCWP: 17 mmHg                   5. Saturations: AO 91%, RA 66%, PA 65%                   6. Rosie CO: 6.17 L/min                   7. Rosie CI: 3.08 L/min*m2                   8. Rosie SVR: 972 dyne*sec/cm5                   9. Rosie PVR: 104 dyne*sec/cm5              B. Opening arterial pressure: 122/72 (88) mmHg              C. LVEDP: 17 mmHg  2. Coronary anatomy:  A. Left main artery: The left main artery bifurcates into the left anterior descending artery and left circumflex artery. The left main artery has no angiographically significant disease. B. Left anterior descending artery: Transapical vessel which gives rise to one large diagonal artery. The LAD has a 30% ostial stenosis. The remainder of the vessel has minor luminal irregularities. The diagonal artery has a 20-30% proximal/mid-vessel stenosis. C. Left circumflex artery: Non-dominant vessel that gives rise to 2 obtuse marginal arteries. The Lcx has minor luminal irregularities. The OM1 is a small vessel with a high origin and minor luminal irregularities. The OM2 is a large branching vessel with a 30% proximal stenosis. The remainder of the vessel has minor luminal irregularities. D. Right coronary artery: Dominant vessel that gives rise to the posterior descending artery and posterolateral branch. The RCA has a 20% proximal stenosis and 30% mid-vessel stenosis. The RPDA has a 20-30% mid-vessel stenosis. The RPLB has minor luminal irregularities. 3. Right branchial venography:  A. The right brachial vein is patent with mild-moderate disease in the distal vessel. Technical Factors:  Complications:  None. Estimated blood loss: Minimal.  Radiation:  Air kerma 847 mGy and 9.4 minutes of fluoroscopy  Sedation: Moderate conscious sedation was administered by qualified nursing personnel under continuous hemodynamic monitoring, starting at 8:36 AM and ending at 9:25 AM.  Medications: 2.5 mg IA verapamil, 2 mg IV Versed, 100 mcg IV Fentanyl, 5,000 units IV heparin  Contrast: 60 cc of Isovue   Impression:  1. Mild non-obstructive coronary artery disease.   2. Mildly elevated left-sided cardiac filling pressures. 3. Moderately elevated right-sided cardiac filling pressures. 4. Borderline mild pulmonary hypertension with mean PAP 25 mmHg. 5. Normal Rosie cardiac output and index. Plan:  1. Increase lasix to 40 mg qAM and 20 mg qPM for the next 3 days and then return to taking 40 mg daily thereafter. 2. Start aspirin 81 mg daily and atorvastatin 40 mg daily. 3. Continue diltiazem  mg daily. 4. Follow-up with [facility] in 2 weeks. Cardiology Labs Reviewed:   CBC: No results for input(s): WBC, HGB, HCT, PLT in the last 72 hours. BMP:No results for input(s): NA, K, CO2, BUN, CREATININE, LABGLOM, GLUCOSE in the last 72 hours. PT/INR: No results for input(s): PROTIME, INR in the last 72 hours. APTT:No results for input(s): APTT in the last 72 hours. FASTING LIPID PANEL:  Lab Results   Component Value Date/Time    HDL 55 04/01/2022 12:00 AM    LDLCALC 90 04/01/2022 12:00 AM    TRIG 106 04/01/2022 12:00 AM     LIVER PROFILE:No results for input(s): AST, ALT, ALB in the last 72 hours. BNP:   Lab Results   Component Value Date/Time    PROBNP 32 05/20/2022 02:58 PM     Reviewed all labs and imaging today    Assessment:   HFpEF: mild fluid overload   - moderately elevated right sided filling pressures on [place] 2/28/2023  Pulmonary HTN: mild  Palpitations: stable  - no significant arrhythmia on Holter 2022  CAD: no angina; mild on angiogram 2/28/2023  HTN: controlled  Possible MIRIAN  Obesity: continued weight loss recommended    Plan:   1. Start spironolactone 25 mg daily for HFpEF and pulmonary HTN  2. BMP in 1-2 weeks  3. Continue aspirin, statin, diltiazem, lasix  4. Check BP at home and call the office if consistently out of goal range  5. Stay active along with a healthy diet  6.  Follow up with me in 1 month and Dr. Alia Boswell in 4 months    ERVIN Yadav-CNP  [facility]  [phone]

## 2023-03-23 ENCOUNTER — HOSPITAL ENCOUNTER (OUTPATIENT)
Age: 68
Discharge: HOME | End: 2023-03-23
Payer: MEDICARE

## 2023-03-23 DIAGNOSIS — I50.32 CHRONIC DIASTOLIC CHF (CONGESTIVE HEART FAILURE) (HCC): ICD-10-CM

## 2023-03-23 LAB
ANION GAP SERPL CALCULATED.3IONS-SCNC: 15 MMOL/L (ref 3–16)
BUN SERPL-MCNC: 18 MG/DL (ref 7–20)
CALCIUM SERPL-MCNC: 9.7 MG/DL (ref 8.3–10.6)
CHLORIDE SERPL-SCNC: 101 MMOL/L (ref 99–110)
CO2 SERPL-SCNC: 24 MMOL/L (ref 21–32)
CREAT SERPL-MCNC: 0.8 MG/DL (ref 0.6–1.2)
GFR SERPLBLD CREATININE-BSD FMLA CKD-EPI: >60 ML/MIN/{1.73_M2}
GLUCOSE SERPL-MCNC: 127 MG/DL (ref 70–99)
POTASSIUM SERPL-SCNC: 4.3 MMOL/L (ref 3.5–5.1)
SODIUM SERPL-SCNC: 140 MMOL/L (ref 136–145)

## 2023-03-23 PROCEDURE — 36415 COLL VENOUS BLD VENIPUNCTURE: CPT

## 2023-03-23 PROCEDURE — 80048 BASIC METABOLIC PNL TOTAL CA: CPT

## 2023-03-24 ENCOUNTER — TELEPHONE (OUTPATIENT)
Dept: CARDIOLOGY CLINIC | Age: 68
End: 2023-03-24

## 2023-03-24 NOTE — TELEPHONE ENCOUNTER
----- Message from Torey Maldonado DO sent at 3/24/2023  8:30 AM EDT -----  Please let patient know that her creatinine and electrolytes remain stable.

## 2023-04-03 ENCOUNTER — HOSPITAL ENCOUNTER (OUTPATIENT)
Dept: MRI IMAGING | Age: 68
Discharge: HOME | End: 2023-04-03
Payer: MEDICARE

## 2023-04-03 DIAGNOSIS — R16.0 LIVER MASS: ICD-10-CM

## 2023-04-03 PROCEDURE — A9579 GAD-BASE MR CONTRAST NOS,1ML: HCPCS | Performed by: INTERNAL MEDICINE

## 2023-04-03 PROCEDURE — 6360000004 HC RX CONTRAST MEDICATION: Performed by: INTERNAL MEDICINE

## 2023-04-03 PROCEDURE — 74183 MRI ABD W/O CNTR FLWD CNTR: CPT

## 2023-04-03 RX ADMIN — GADOTERIDOL 20 ML: 279.3 INJECTION, SOLUTION INTRAVENOUS at 08:34

## 2023-04-16 ASSESSMENT — ENCOUNTER SYMPTOMS
GASTROINTESTINAL NEGATIVE: 1
SHORTNESS OF BREATH: 1

## 2023-04-17 ENCOUNTER — OFFICE VISIT (OUTPATIENT)
Dept: CARDIOLOGY CLINIC | Age: 68
End: 2023-04-17
Payer: MEDICARE

## 2023-04-17 VITALS
DIASTOLIC BLOOD PRESSURE: 78 MMHG | SYSTOLIC BLOOD PRESSURE: 128 MMHG | TEMPERATURE: 98.6 F | HEART RATE: 98 BPM | OXYGEN SATURATION: 95 % | HEIGHT: 62 IN | BODY MASS INDEX: 39.9 KG/M2 | WEIGHT: 216.8 LBS

## 2023-04-17 DIAGNOSIS — I25.10 CORONARY ARTERY DISEASE INVOLVING NATIVE CORONARY ARTERY OF NATIVE HEART WITHOUT ANGINA PECTORIS: ICD-10-CM

## 2023-04-17 DIAGNOSIS — I50.32 CHRONIC DIASTOLIC (CONGESTIVE) HEART FAILURE (HCC): Primary | ICD-10-CM

## 2023-04-17 PROCEDURE — 3074F SYST BP LT 130 MM HG: CPT | Performed by: NURSE PRACTITIONER

## 2023-04-17 PROCEDURE — 99214 OFFICE O/P EST MOD 30 MIN: CPT | Performed by: NURSE PRACTITIONER

## 2023-04-17 PROCEDURE — 1123F ACP DISCUSS/DSCN MKR DOCD: CPT | Performed by: NURSE PRACTITIONER

## 2023-04-17 PROCEDURE — 3078F DIAST BP <80 MM HG: CPT | Performed by: NURSE PRACTITIONER

## 2023-04-17 NOTE — PROGRESS NOTES
[facility]   Cardiology Note              Date:  April 16, 2023  Patientname: Pato Connolly  YOB: 1955    Primary Care physician: RENARD Jesus    HISTORY OF PRESENT ILLNESS: Pato Connolly is a 69 y.o. female with a history of mild CAD, HFpEF, HTN, fibromyalgia. Cardiac monitor 5/2022 showed SR with PACs/PVCs. Echo 6/2022 showed EF>65%, mild LVH. Due to ongoing shortness of breath, [facility] 2/28/2023 showed mild CAD, elevated filling pressures, lasix temporarily increased. At office appointment 3/15/2023, spironolactone started. Today she presents for follow up for HFpEF. She is losing weight and thinks she feels better. Edema has slightly improved, not much change in shortness of breath with exertion. She had an episode of chest burning that occurred at rest, lasted about 2 hours and resolved spontaneously (she fell asleep and when she woke up it was gone), no recurrence. She has occasional palpitations. She aims to walk about 7000-10,000 steps a day, tolerating well. Office weight 4/17/2023: 216 lbs  Office weight 2/6/2023: 224 lbs    Cardiologist: Dr. Rolo Coles 2/2023    Past Medical History:   has a past medical history of Colitis, Diverticulosis, MVP (mitral valve prolapse), and Spastic colon. Past Surgical History:   has a past surgical history that includes Hysterectomy; Inner ear surgery; Carpal tunnel release; Appendectomy; Tonsillectomy; Breast enhancement surgery (1973); Upper gastrointestinal endoscopy (08/17/2017); Colonoscopy (08/17/2017); and joint replacement (Right). Home Medications:    Prior to Admission medications    Medication Sig Start Date End Date Taking?  Authorizing Provider   spironolactone (ALDACTONE) 25 MG tablet Take 1 tablet by mouth daily 3/15/23   ERVIN Camacho CNP   aspirin EC 81 MG EC tablet Take 1 tablet by mouth daily 2/28/23   Adrian Brower,    atorvastatin (LIPITOR) 40 MG tablet Take 1 tablet by mouth daily 2/28/23   Adrian Brower,

## 2023-04-17 NOTE — PATIENT INSTRUCTIONS
Everything looks great today, good job! Continue current medications  Stay active along with a healthy diet, you're doing great!   Check BP at home and call the office if consistently out of goal range  Follow up as planned with Dr. Maile Perales in July

## 2023-06-01 ENCOUNTER — OFFICE VISIT (OUTPATIENT)
Dept: FAMILY MEDICINE CLINIC | Age: 68
End: 2023-06-01
Payer: MEDICARE

## 2023-06-01 VITALS
OXYGEN SATURATION: 96 % | TEMPERATURE: 97.6 F | DIASTOLIC BLOOD PRESSURE: 68 MMHG | HEART RATE: 104 BPM | BODY MASS INDEX: 39.86 KG/M2 | HEIGHT: 62 IN | WEIGHT: 216.6 LBS | SYSTOLIC BLOOD PRESSURE: 130 MMHG

## 2023-06-01 DIAGNOSIS — R73.01 IFG (IMPAIRED FASTING GLUCOSE): ICD-10-CM

## 2023-06-01 DIAGNOSIS — F33.1 MODERATE EPISODE OF RECURRENT MAJOR DEPRESSIVE DISORDER (HCC): ICD-10-CM

## 2023-06-01 DIAGNOSIS — R53.83 FATIGUE, UNSPECIFIED TYPE: ICD-10-CM

## 2023-06-01 DIAGNOSIS — J84.9 ILD (INTERSTITIAL LUNG DISEASE) (HCC): ICD-10-CM

## 2023-06-01 DIAGNOSIS — I50.31 ACUTE HEART FAILURE WITH PRESERVED EJECTION FRACTION (HCC): Primary | ICD-10-CM

## 2023-06-01 DIAGNOSIS — E66.01 OBESITY, CLASS III, BMI 40-49.9 (MORBID OBESITY) (HCC): ICD-10-CM

## 2023-06-01 DIAGNOSIS — E55.9 VITAMIN D DEFICIENCY: ICD-10-CM

## 2023-06-01 DIAGNOSIS — I10 ESSENTIAL HYPERTENSION: ICD-10-CM

## 2023-06-01 DIAGNOSIS — E78.5 DYSLIPIDEMIA: ICD-10-CM

## 2023-06-01 PROCEDURE — 1123F ACP DISCUSS/DSCN MKR DOCD: CPT | Performed by: PHYSICIAN ASSISTANT

## 2023-06-01 PROCEDURE — 3078F DIAST BP <80 MM HG: CPT | Performed by: PHYSICIAN ASSISTANT

## 2023-06-01 PROCEDURE — 3075F SYST BP GE 130 - 139MM HG: CPT | Performed by: PHYSICIAN ASSISTANT

## 2023-06-01 PROCEDURE — 99214 OFFICE O/P EST MOD 30 MIN: CPT | Performed by: PHYSICIAN ASSISTANT

## 2023-06-01 SDOH — ECONOMIC STABILITY: FOOD INSECURITY: WITHIN THE PAST 12 MONTHS, YOU WORRIED THAT YOUR FOOD WOULD RUN OUT BEFORE YOU GOT MONEY TO BUY MORE.: NEVER TRUE

## 2023-06-01 SDOH — ECONOMIC STABILITY: INCOME INSECURITY: HOW HARD IS IT FOR YOU TO PAY FOR THE VERY BASICS LIKE FOOD, HOUSING, MEDICAL CARE, AND HEATING?: NOT HARD AT ALL

## 2023-06-01 SDOH — ECONOMIC STABILITY: FOOD INSECURITY: WITHIN THE PAST 12 MONTHS, THE FOOD YOU BOUGHT JUST DIDN'T LAST AND YOU DIDN'T HAVE MONEY TO GET MORE.: NEVER TRUE

## 2023-06-01 ASSESSMENT — PATIENT HEALTH QUESTIONNAIRE - PHQ9
SUM OF ALL RESPONSES TO PHQ9 QUESTIONS 1 & 2: 2
SUM OF ALL RESPONSES TO PHQ QUESTIONS 1-9: 14
1. LITTLE INTEREST OR PLEASURE IN DOING THINGS: 2
SUM OF ALL RESPONSES TO PHQ QUESTIONS 1-9: 14
6. FEELING BAD ABOUT YOURSELF - OR THAT YOU ARE A FAILURE OR HAVE LET YOURSELF OR YOUR FAMILY DOWN: 1
2. FEELING DOWN, DEPRESSED OR HOPELESS: 0
3. TROUBLE FALLING OR STAYING ASLEEP: 3
10. IF YOU CHECKED OFF ANY PROBLEMS, HOW DIFFICULT HAVE THESE PROBLEMS MADE IT FOR YOU TO DO YOUR WORK, TAKE CARE OF THINGS AT HOME, OR GET ALONG WITH OTHER PEOPLE: 1
SUM OF ALL RESPONSES TO PHQ QUESTIONS 1-9: 14
9. THOUGHTS THAT YOU WOULD BE BETTER OFF DEAD, OR OF HURTING YOURSELF: 0
8. MOVING OR SPEAKING SO SLOWLY THAT OTHER PEOPLE COULD HAVE NOTICED. OR THE OPPOSITE, BEING SO FIGETY OR RESTLESS THAT YOU HAVE BEEN MOVING AROUND A LOT MORE THAN USUAL: 0
SUM OF ALL RESPONSES TO PHQ QUESTIONS 1-9: 14
5. POOR APPETITE OR OVEREATING: 3
4. FEELING TIRED OR HAVING LITTLE ENERGY: 2
7. TROUBLE CONCENTRATING ON THINGS, SUCH AS READING THE NEWSPAPER OR WATCHING TELEVISION: 3

## 2023-06-01 ASSESSMENT — ANXIETY QUESTIONNAIRES
2. NOT BEING ABLE TO STOP OR CONTROL WORRYING: 0
6. BECOMING EASILY ANNOYED OR IRRITABLE: 0
IF YOU CHECKED OFF ANY PROBLEMS ON THIS QUESTIONNAIRE, HOW DIFFICULT HAVE THESE PROBLEMS MADE IT FOR YOU TO DO YOUR WORK, TAKE CARE OF THINGS AT HOME, OR GET ALONG WITH OTHER PEOPLE: NOT DIFFICULT AT ALL
5. BEING SO RESTLESS THAT IT IS HARD TO SIT STILL: 1
3. WORRYING TOO MUCH ABOUT DIFFERENT THINGS: 0
7. FEELING AFRAID AS IF SOMETHING AWFUL MIGHT HAPPEN: 0
1. FEELING NERVOUS, ANXIOUS, OR ON EDGE: 0

## 2023-06-01 ASSESSMENT — ENCOUNTER SYMPTOMS
NAUSEA: 0
DIARRHEA: 0
VOMITING: 0
ABDOMINAL PAIN: 0
SORE THROAT: 0
COUGH: 0
SHORTNESS OF BREATH: 0
RHINORRHEA: 0
CONSTIPATION: 0

## 2023-06-01 NOTE — PROGRESS NOTES
Weight: 216 lb 9.6 oz (98.2 kg)   Height: 5' 1.5\" (1.562 m)     Estimated body mass index is 40.26 kg/m² as calculated from the following:    Height as of this encounter: 5' 1.5\" (1.562 m). Weight as of this encounter: 216 lb 9.6 oz (98.2 kg). Physical Exam  Constitutional:       General: She is not in acute distress. Appearance: She is well-developed. HENT:      Head: Normocephalic and atraumatic. Eyes:      Extraocular Movements: Extraocular movements intact. Conjunctiva/sclera: Conjunctivae normal.      Pupils: Pupils are equal, round, and reactive to light. Cardiovascular:      Rate and Rhythm: Normal rate and regular rhythm. Heart sounds: Normal heart sounds. No murmur heard. Pulmonary:      Effort: Pulmonary effort is normal.      Breath sounds: Normal breath sounds. No wheezing. Musculoskeletal:      Cervical back: Neck supple. Lymphadenopathy:      Cervical: No cervical adenopathy. Skin:     General: Skin is warm and dry. Findings: No rash. Neurological:      Mental Status: She is alert and oriented to person, place, and time.    Psychiatric:         Mood and Affect: Mood normal.         Behavior: Behavior normal.

## 2023-06-19 RX ORDER — FUROSEMIDE 40 MG/1
TABLET ORAL
Qty: 90 TABLET | Refills: 0 | Status: SHIPPED | OUTPATIENT
Start: 2023-06-19

## 2023-07-20 NOTE — PROGRESS NOTES
[facility]   Cardiac Office Note    Referring Provider:  RENARD Hollins     No chief complaint on file. Originally saw 5/20/22  Subjective: Ms. Jenn Pineda is here today to for routine f/u c/o***     History of Present Illness:  Umesh Posadas is a 69 y.o. female who has PMH of HTN, tobacco abuse (quit 2010--1ppd prior), and Fibromyalgia. Most recent GXT stress ECHO 11/12/14 Negative ECG for ischemia with graded exercise test. Duke Treadmill Score is 5 which indicates low risk. Negative stress echo with no indication of inducible ischemia. Most recent EKG 3/31/22 SR 97 bpm; NST change. Most recent 48 hour monitor 5/20-5/23/22 Predominately NSR, PAC's 0.02%, PVC 0.01%. Most recent ECHO 6/15/22 LVEF>65%. mild cLVH. Grade I DD. Mild MAC; Trace to mild MR. Trace TR. Elevated heart rate throughout study. Dr. Alexander Branham ordered PFT 9/21/2022 noted borderline mild restrictive lung defect and 6 MWT which was normal without hypoxia walking. He ordered Chest CT 10/18/22 negative for ILD; steatosis; nonsp 3.5cm liver mass. F/U CT imaging 11/22 demonstrated mass likely represented area of focal fat. Last note reported no concerning issues on PFT's or CT imaging to explain SOB. 6MWT normal also. She underwent [facility] 2/28/2023 showed mild CAD, elevated filling pressure, lasix temporarily increased. Most recent EKG 2/28/23 NSR;poor R wave progression 98 bpm.  She followed up with Zarina Cook NP on 3/15/2023 and added spironolactone 25 mg daily. Today, she reports ***      Weight today is ***# (Up ***# from 2/6/2023)224      Past Medical History:   has a past medical history of Colitis, Diverticulosis, MVP (mitral valve prolapse), and Spastic colon. Surgical History:   has a past surgical history that includes Hysterectomy; Inner ear surgery; Carpal tunnel release; Appendectomy; Tonsillectomy; Breast enhancement surgery (1973); Upper gastrointestinal endoscopy (08/17/2017);  Colonoscopy (08/17/2017); and joint

## 2023-07-25 NOTE — PROGRESS NOTES
[facility]   Cardiac Office Note    Referring Provider:  RENARD Zarco     Chief Complaint   Patient presents with    Follow-up    Hypertension    Congestive Heart Failure       Originally saw 5/20/22  Subjective: Ms. Marcellus Blue is here today to for routine cardiology follow up; c/o baseline VILLELA today     History of Present Illness:  Diego Aldana is a 69 y.o. female who has PMH HTN, NOCAD dx 2/23, tobacco abuse (quit 2010--1ppd prior), and Fibromyalgia. Most recent GXT stress ECHO 11/12/14 Negative ECG for ischemia with graded exercise test. Duke Treadmill Score is 5 which indicates low risk. Negative stress echo with no indication of inducible ischemia. Most recent EKG 3/31/22 SR 97 bpm; NST change. 48 hour monitor 5/20-5/23/22 Predominately NSR, PAC's 0.02%, PVC 0.01%. ECHO 6/15/22 LVEF>65%. mild cLVH. Grade I DD. Mild MAC; Trace to mild MR. Trace TR. Elevated heart rate throughout study. Dr. Yoel Sullivan ordered PFT 9/21/2022 noted borderline mild restrictive lung defect and 6 MWT which was normal without hypoxia walking. Chest CT 10/18/22 negative for ILD; steatosis; nonsp 3.5cm liver mass. F/U CT imaging 11/22 demonstrated mass likely represented area of focal fat. No concerning issues on PFT's or CT imaging to explain SOB. 6MWT normal also. [facility] 2/28/2023 showed mild NOCAD (20-30% multiple branches, mildly elevated right and left filling pressure (LVEDP 17, RA=13). EKG 2/28/23 NSR;poor R wave progression 98 bpm.  OV with Hakan Hodges NP  3/15/23 started spironolactone 25 mg daily and lasix temporarily increased to BID for few days. Today, she reports still having baseline VILLELA. There has no change in her breathing with adding spirinolactone or doubling lasix (just made her thirsty). She thinks her SOB could be related to her weight and she is trying to work on it. She walks her dog 4 times a day and is walking around 10,000 steps a day. She is able to do all of her daily activities.   Patient

## 2023-07-26 ENCOUNTER — OFFICE VISIT (OUTPATIENT)
Dept: CARDIOLOGY CLINIC | Age: 68
End: 2023-07-26
Payer: MEDICARE

## 2023-07-26 VITALS
BODY MASS INDEX: 40.96 KG/M2 | WEIGHT: 222.6 LBS | OXYGEN SATURATION: 96 % | HEIGHT: 62 IN | SYSTOLIC BLOOD PRESSURE: 133 MMHG | HEART RATE: 102 BPM | DIASTOLIC BLOOD PRESSURE: 76 MMHG

## 2023-07-26 DIAGNOSIS — I10 ESSENTIAL HYPERTENSION: Primary | ICD-10-CM

## 2023-07-26 DIAGNOSIS — Z79.899 MEDICATION MANAGEMENT: ICD-10-CM

## 2023-07-26 DIAGNOSIS — Z87.891 HISTORY OF TOBACCO ABUSE: ICD-10-CM

## 2023-07-26 DIAGNOSIS — R06.02 SOB (SHORTNESS OF BREATH) ON EXERTION: ICD-10-CM

## 2023-07-26 PROCEDURE — 3075F SYST BP GE 130 - 139MM HG: CPT | Performed by: INTERNAL MEDICINE

## 2023-07-26 PROCEDURE — 99214 OFFICE O/P EST MOD 30 MIN: CPT | Performed by: INTERNAL MEDICINE

## 2023-07-26 PROCEDURE — 1123F ACP DISCUSS/DSCN MKR DOCD: CPT | Performed by: INTERNAL MEDICINE

## 2023-07-26 PROCEDURE — 3078F DIAST BP <80 MM HG: CPT | Performed by: INTERNAL MEDICINE

## 2023-07-26 RX ORDER — DILTIAZEM HYDROCHLORIDE 360 MG/1
360 CAPSULE, EXTENDED RELEASE ORAL DAILY
Qty: 90 CAPSULE | Refills: 3 | Status: SHIPPED | OUTPATIENT
Start: 2023-07-26

## 2023-07-26 NOTE — PATIENT INSTRUCTIONS
Plan:  Current medications reviewed. No refills given as warranted. Repeat blood work  -BMP, Lipids   -you will need to be fasting for blood work  -it is ok to take your medicine with sips of water or black coffee  No additional cardiac testing at this time. I will personally review your tests and then I will have my staff call you with the results. You have some small 20-30% blockages and I my increase your cholesterol medication  Increase you diltiazem to 360 mg daily when your current bottle of 240 mg is finished. Follow up with me in 6 months, call in December to make your next appointment.

## 2023-08-28 RX ORDER — ATORVASTATIN CALCIUM 40 MG/1
TABLET, FILM COATED ORAL
Qty: 90 TABLET | Refills: 0 | Status: SHIPPED | OUTPATIENT
Start: 2023-08-28

## 2023-08-29 NOTE — TELEPHONE ENCOUNTER
Pt stated that she picked up the diltiazem from the pharmacy and it was the 240mg. Pt stated that at her last ov in July 2023 that medication was increased to 360mg. Pt would like to know if she should take the 240mg. Please advise.

## 2023-08-30 RX ORDER — DILTIAZEM HYDROCHLORIDE 360 MG/1
360 CAPSULE, EXTENDED RELEASE ORAL DAILY
Qty: 90 CAPSULE | Refills: 3 | Status: CANCELLED | OUTPATIENT
Start: 2023-08-30

## 2023-08-30 RX ORDER — DILTIAZEM HYDROCHLORIDE 360 MG/1
360 CAPSULE, EXTENDED RELEASE ORAL DAILY
Qty: 90 CAPSULE | Refills: 3 | Status: SHIPPED | OUTPATIENT
Start: 2023-08-30

## 2023-08-30 NOTE — TELEPHONE ENCOUNTER
I resent script for the 360 mg daily. This was sent 7/26/2023 but per patient the pharmacy states they do not have this.

## 2023-08-30 NOTE — TELEPHONE ENCOUNTER
Per [facility] last OV 7/26/23 note:    Plan:  Current medications reviewed. No refills given as warranted. Repeat blood work  -BMP, Lipids   -you will need to be fasting for blood work  -it is ok to take your medicine with sips of water or black coffee  No additional cardiac testing at this time. I will personally review your tests and then I will have my staff call you with the results. You have some small 20-30% blockages and I my increase your cholesterol medication  Increase you diltiazem to 360 mg daily when your current bottle of 240 mg is finished.     No upcoming OV  BMP 3/23/23  CMP 3/9/23  [facility] OOT

## 2023-09-07 RX ORDER — FUROSEMIDE 40 MG/1
TABLET ORAL
Qty: 90 TABLET | Refills: 3 | Status: SHIPPED | OUTPATIENT
Start: 2023-09-07

## 2023-10-23 ENCOUNTER — HOSPITAL ENCOUNTER (OUTPATIENT)
Dept: CT IMAGING | Age: 68
Discharge: HOME | End: 2023-10-23
Attending: INTERNAL MEDICINE
Payer: MEDICARE

## 2023-10-23 DIAGNOSIS — F17.200 SMOKING: ICD-10-CM

## 2023-10-23 PROCEDURE — 71271 CT THORAX LUNG CANCER SCR C-: CPT

## 2023-10-27 ENCOUNTER — TELEPHONE (OUTPATIENT)
Dept: BARIATRICS/WEIGHT MGMT | Age: 68
End: 2023-10-27

## 2023-10-27 ENCOUNTER — OFFICE VISIT (OUTPATIENT)
Dept: PULMONOLOGY | Age: 68
End: 2023-10-27

## 2023-10-27 VITALS
DIASTOLIC BLOOD PRESSURE: 80 MMHG | OXYGEN SATURATION: 96 % | HEART RATE: 110 BPM | HEIGHT: 62 IN | SYSTOLIC BLOOD PRESSURE: 130 MMHG | WEIGHT: 222 LBS | BODY MASS INDEX: 40.85 KG/M2

## 2023-10-27 DIAGNOSIS — Z87.891 PERSONAL HISTORY OF TOBACCO USE: Primary | ICD-10-CM

## 2023-10-27 RX ORDER — ALBUTEROL SULFATE 90 UG/1
2 AEROSOL, METERED RESPIRATORY (INHALATION) EVERY 4 HOURS PRN
Qty: 18 G | Refills: 6 | Status: SHIPPED | OUTPATIENT
Start: 2023-10-27

## 2023-10-27 ASSESSMENT — SLEEP AND FATIGUE QUESTIONNAIRES
HOW LIKELY ARE YOU TO NOD OFF OR FALL ASLEEP WHILE SITTING QUIETLY AFTER LUNCH WITHOUT ALCOHOL: 3
HOW LIKELY ARE YOU TO NOD OFF OR FALL ASLEEP WHEN YOU ARE A PASSENGER IN A CAR FOR AN HOUR WITHOUT A BREAK: 3
HOW LIKELY ARE YOU TO NOD OFF OR FALL ASLEEP WHILE SITTING AND READING: 3
HOW LIKELY ARE YOU TO NOD OFF OR FALL ASLEEP WHILE WATCHING TV: 3
HOW LIKELY ARE YOU TO NOD OFF OR FALL ASLEEP WHILE LYING DOWN TO REST IN THE AFTERNOON WHEN CIRCUMSTANCES PERMIT: 3
HOW LIKELY ARE YOU TO NOD OFF OR FALL ASLEEP WHILE SITTING INACTIVE IN A PUBLIC PLACE: 2
HOW LIKELY ARE YOU TO NOD OFF OR FALL ASLEEP IN A CAR, WHILE STOPPED FOR A FEW MINUTES IN TRAFFIC: 1
NECK CIRCUMFERENCE (INCHES): 14.5
ESS TOTAL SCORE: 18
HOW LIKELY ARE YOU TO NOD OFF OR FALL ASLEEP WHILE SITTING AND TALKING TO SOMEONE: 0

## 2023-10-27 NOTE — TELEPHONE ENCOUNTER
Patient called re 10/5 seminar - she states she sent her stuff in 3 weeks ago (ins) and then called yesterday was told we dont have.   Printed card and put with MA that runs benefits

## 2023-10-27 NOTE — PROGRESS NOTES
P  [facility]                                               Pulmonary Clinic Consult     I had the pleasure of seeing  James Hall     Chief Complaint   Patient presents with    Follow-up    Shortness of Breath    Sleep Apnea       HISTORY OF PRESENT ILLNESS:    James Hall is a 69y.o. year old  Who  has 30 PPY smoking history     She  quit smoking 10 years ,still vape once in while     The Patient comes in with SOB that has been going on the last 10  years  Associated with cough ,however it got worse with ambulation ,he  states that it get worse with exercise or walking long distance and he can walk . 1 mile .  And go 1 flight of stairs before get short winded    He/She  has cough ,productive for   Sputum and it is more in the   Had Covid 3 times   She had stress and   She has sleep apnea she states there is no way to put cpap   Today visit  She has been stable   SOB only when ambulate  Quit vaping   Gain 30 # last few years   Denies any CP  No hemoptysis  Still with VILLELA but not worse        ALLERGIES:    Allergies   Allergen Reactions    Omeprazole Diarrhea    Sertraline Other (See Comments)     Suicidal ideation requiring hospitalization  Suicidal ideation requiring hospitalization  Suicidal ideation requiring hospitalization    Sulfa Antibiotics Nausea Only and Other (See Comments)     dizziness    Propofol Nausea And Vomiting     Pt notes along with nausea and vomiting she had difficulty waking up after surgery       PAST MEDICAL HISTORY:       Diagnosis Date    Colitis     Diverticulosis     MVP (mitral valve prolapse)     Spastic colon        MEDICATIONS:   Current Outpatient Medications   Medication Sig Dispense Refill    furosemide (LASIX) 40 MG tablet Take 1 tablet by mouth once daily 90 tablet 3    dilTIAZem (CARDIZEM CD) 360 MG extended release capsule Take 1 capsule by mouth daily 90 capsule 3    atorvastatin (LIPITOR) 40 MG tablet Take 1 tablet by mouth positive S1/positive S2

## 2023-10-31 ENCOUNTER — TELEPHONE (OUTPATIENT)
Dept: BARIATRICS/WEIGHT MGMT | Age: 68
End: 2023-10-31

## 2023-10-31 NOTE — TELEPHONE ENCOUNTER
Patient was sent Dr. Mila Benitez digital bariatric seminar. Patient DOES have BWLS coverage with [facility] Medicare (no req diet) Bariatric benefit form scanned in media.     *Spoke with patient, Info given  Per pt: no h/o WLS, BMI > 35  Pk mailed

## 2023-11-16 ENCOUNTER — TELEMEDICINE (OUTPATIENT)
Dept: FAMILY MEDICINE CLINIC | Age: 68
End: 2023-11-16
Payer: MEDICARE

## 2023-11-16 DIAGNOSIS — J06.9 VIRAL URI: Primary | ICD-10-CM

## 2023-11-16 PROCEDURE — 99213 OFFICE O/P EST LOW 20 MIN: CPT | Performed by: PHYSICIAN ASSISTANT

## 2023-11-16 PROCEDURE — 1123F ACP DISCUSS/DSCN MKR DOCD: CPT | Performed by: PHYSICIAN ASSISTANT

## 2023-11-16 RX ORDER — BENZONATATE 100 MG/1
100 CAPSULE ORAL 3 TIMES DAILY PRN
Qty: 30 CAPSULE | Refills: 0 | Status: SHIPPED | OUTPATIENT
Start: 2023-11-16 | End: 2023-11-26

## 2023-11-16 ASSESSMENT — ENCOUNTER SYMPTOMS
VOMITING: 0
CONSTIPATION: 0
NAUSEA: 0
ABDOMINAL PAIN: 0
SORE THROAT: 0
SHORTNESS OF BREATH: 0
RHINORRHEA: 0
SINUS PAIN: 1
COUGH: 1
DIARRHEA: 0
SINUS PRESSURE: 1

## 2023-11-16 NOTE — PROGRESS NOTES
palpitations. Gastrointestinal:  Negative for abdominal pain, constipation, diarrhea, nausea and vomiting. Genitourinary:  Negative for difficulty urinating and dysuria. Musculoskeletal:  Negative for arthralgias and myalgias. Skin:  Negative for rash. Neurological:  Negative for dizziness, weakness and numbness. Psychiatric/Behavioral:  Negative for sleep disturbance.            Objective   Patient-Reported Vitals  No data recorded     Physical Exam  [INSTRUCTIONS:  \"[x]\" Indicates a positive item  \"[]\" Indicates a negative item  -- DELETE ALL ITEMS NOT EXAMINED]    Constitutional: [x] Appears well-developed and well-nourished [x] No apparent distress      [] Abnormal -     Mental status: [x] Alert and awake  [x] Oriented to person/place/time [x] Able to follow commands    [] Abnormal -     Eyes:   EOM    [x]  Normal    [] Abnormal -   Sclera  [x]  Normal    [] Abnormal -          Discharge [x]  None visible   [] Abnormal -     HENT: [x] Normocephalic, atraumatic  [] Abnormal -   [x] Mouth/Throat: Mucous membranes are moist    External Ears [x] Normal  [] Abnormal -    Neck: [x] No visualized mass [] Abnormal -     Pulmonary/Chest: [x] Respiratory effort normal   [x] No visualized signs of difficulty breathing or respiratory distress        [] Abnormal -      Musculoskeletal:   [x] Normal gait with no signs of ataxia         [x] Normal range of motion of neck        [] Abnormal -     Neurological:        [x] No Facial Asymmetry (Cranial nerve 7 motor function) (limited exam due to video visit)          [x] No gaze palsy        [] Abnormal -          Skin:        [x] No significant exanthematous lesions or discoloration noted on facial skin         [] Abnormal -            Psychiatric:       [x] Normal Affect [] Abnormal -        [x] No Hallucinations    Other pertinent observable physical exam findings:-             --RENARD Kang

## 2023-11-20 RX ORDER — ATORVASTATIN CALCIUM 40 MG/1
TABLET, FILM COATED ORAL
Qty: 90 TABLET | Refills: 0 | Status: SHIPPED | OUTPATIENT
Start: 2023-11-20

## 2023-11-30 DIAGNOSIS — F33.1 MODERATE EPISODE OF RECURRENT MAJOR DEPRESSIVE DISORDER (HCC): ICD-10-CM

## 2023-11-30 DIAGNOSIS — M79.7 FIBROMYALGIA: ICD-10-CM

## 2023-11-30 RX ORDER — DULOXETIN HYDROCHLORIDE 60 MG/1
CAPSULE, DELAYED RELEASE ORAL
Qty: 90 CAPSULE | Refills: 1 | Status: SHIPPED | OUTPATIENT
Start: 2023-11-30

## 2023-11-30 NOTE — TELEPHONE ENCOUNTER
Refill Request     CONFIRM preferred pharmacy with the patient. If Mail Order Rx - Pend for 90 day refill. Last Seen: Last Seen Department: 11/16/2023  Last Seen by PCP: 11/16/2023    Last Written: 06/15/2023 90 cap 1 refills     If no future appointment scheduled:  Review the last OV with PCP and review information for follow-up visit,  Route STAFF MESSAGE with patient name to the [facility] for scheduling with the following information:            -  Timing of next visit           -  Visit type ie Physical, OV, etc           -  Diagnoses/Reason ie. COPD, HTN - Do not use MEDICATION, Follow-up or CHECK UP - Give reason for visit      Next Appointment:   Future Appointments   Date Time Provider [address]   12/1/2023 10:30 AM RENARD Florentino  [place] - DYD   12/14/2023 10:45 AM Claudia Flowers MD [facility] WT MMA   10/28/2024 11:00 AM [facility] [place] [facility] Rad   11/6/2024 11:00 AM MD Marty Coleman [facility]       Message sent to [address] [place] [address] to schedule appt with patient?   NO      Requested Prescriptions     Pending Prescriptions Disp Refills    DULoxetine (CYMBALTA) 60 MG extended release capsule [Pharmacy Med Name: DULoxetine HCl 60 MG Oral Capsule Delayed Release Particles] 90 capsule 0     Sig: Take 1 capsule by mouth once daily

## 2023-12-07 ENCOUNTER — HOSPITAL ENCOUNTER (OUTPATIENT)
Age: 68
Discharge: HOME | End: 2023-12-07
Payer: MEDICARE

## 2023-12-07 DIAGNOSIS — R73.01 IFG (IMPAIRED FASTING GLUCOSE): ICD-10-CM

## 2023-12-07 DIAGNOSIS — R53.83 FATIGUE, UNSPECIFIED TYPE: ICD-10-CM

## 2023-12-07 DIAGNOSIS — E78.5 DYSLIPIDEMIA: ICD-10-CM

## 2023-12-07 DIAGNOSIS — Z79.899 MEDICATION MANAGEMENT: ICD-10-CM

## 2023-12-07 DIAGNOSIS — I10 ESSENTIAL HYPERTENSION: ICD-10-CM

## 2023-12-07 DIAGNOSIS — E55.9 VITAMIN D DEFICIENCY: ICD-10-CM

## 2023-12-07 LAB
25(OH)D3 SERPL-MCNC: 18.7 NG/ML
ALBUMIN SERPL-MCNC: 4.4 G/DL (ref 3.4–5)
ALBUMIN/GLOB SERPL: 1.4 {RATIO} (ref 1.1–2.2)
ALP SERPL-CCNC: 136 U/L (ref 40–129)
ALT SERPL-CCNC: 30 U/L (ref 10–40)
ANION GAP SERPL CALCULATED.3IONS-SCNC: 13 MMOL/L (ref 3–16)
ANION GAP SERPL CALCULATED.3IONS-SCNC: 14 MMOL/L (ref 3–16)
AST SERPL-CCNC: 21 U/L (ref 15–37)
BILIRUB SERPL-MCNC: 0.4 MG/DL (ref 0–1)
BUN SERPL-MCNC: 18 MG/DL (ref 7–20)
BUN SERPL-MCNC: 19 MG/DL (ref 7–20)
CALCIUM SERPL-MCNC: 9.8 MG/DL (ref 8.3–10.6)
CALCIUM SERPL-MCNC: 9.8 MG/DL (ref 8.3–10.6)
CHLORIDE SERPL-SCNC: 101 MMOL/L (ref 99–110)
CHLORIDE SERPL-SCNC: 102 MMOL/L (ref 99–110)
CHOLEST SERPL-MCNC: 102 MG/DL (ref 0–199)
CO2 SERPL-SCNC: 26 MMOL/L (ref 21–32)
CO2 SERPL-SCNC: 26 MMOL/L (ref 21–32)
CREAT SERPL-MCNC: 0.8 MG/DL (ref 0.6–1.2)
CREAT SERPL-MCNC: 0.8 MG/DL (ref 0.6–1.2)
DEPRECATED RDW RBC AUTO: 15.5 % (ref 12.4–15.4)
GFR SERPLBLD CREATININE-BSD FMLA CKD-EPI: >60 ML/MIN/{1.73_M2}
GFR SERPLBLD CREATININE-BSD FMLA CKD-EPI: >60 ML/MIN/{1.73_M2}
GLUCOSE SERPL-MCNC: 124 MG/DL (ref 70–99)
GLUCOSE SERPL-MCNC: 126 MG/DL (ref 70–99)
HCT VFR BLD AUTO: 40.3 % (ref 36–48)
HDLC SERPL-MCNC: 49 MG/DL (ref 40–60)
HGB BLD-MCNC: 13.2 G/DL (ref 12–16)
LDLC SERPL CALC-MCNC: 37 MG/DL
MCH RBC QN AUTO: 28.2 PG (ref 26–34)
MCHC RBC AUTO-ENTMCNC: 32.8 G/DL (ref 31–36)
MCV RBC AUTO: 85.9 FL (ref 80–100)
PLATELET # BLD AUTO: 274 K/UL (ref 135–450)
PMV BLD AUTO: 8.8 FL (ref 5–10.5)
POTASSIUM SERPL-SCNC: 4.2 MMOL/L (ref 3.5–5.1)
POTASSIUM SERPL-SCNC: 4.4 MMOL/L (ref 3.5–5.1)
PROT SERPL-MCNC: 7.5 G/DL (ref 6.4–8.2)
RBC # BLD AUTO: 4.68 M/UL (ref 4–5.2)
SODIUM SERPL-SCNC: 140 MMOL/L (ref 136–145)
SODIUM SERPL-SCNC: 142 MMOL/L (ref 136–145)
TRIGL SERPL-MCNC: 79 MG/DL (ref 0–150)
TSH SERPL DL<=0.005 MIU/L-ACNC: 2.35 UIU/ML (ref 0.27–4.2)
VIT B12 SERPL-MCNC: 439 PG/ML (ref 211–911)
VLDLC SERPL CALC-MCNC: 16 MG/DL
WBC # BLD AUTO: 8.7 K/UL (ref 4–11)

## 2023-12-07 PROCEDURE — 36415 COLL VENOUS BLD VENIPUNCTURE: CPT

## 2023-12-07 PROCEDURE — 84443 ASSAY THYROID STIM HORMONE: CPT

## 2023-12-07 PROCEDURE — 80053 COMPREHEN METABOLIC PANEL: CPT

## 2023-12-07 PROCEDURE — 85027 COMPLETE CBC AUTOMATED: CPT

## 2023-12-07 PROCEDURE — 80061 LIPID PANEL: CPT

## 2023-12-07 PROCEDURE — 83036 HEMOGLOBIN GLYCOSYLATED A1C: CPT

## 2023-12-07 PROCEDURE — 82306 VITAMIN D 25 HYDROXY: CPT

## 2023-12-07 PROCEDURE — 82607 VITAMIN B-12: CPT

## 2023-12-08 LAB
EST. AVERAGE GLUCOSE BLD GHB EST-MCNC: 148.5 MG/DL
HBA1C MFR BLD: 6.8 %

## 2023-12-13 ENCOUNTER — TELEPHONE (OUTPATIENT)
Dept: BARIATRICS/WEIGHT MGMT | Age: 68
End: 2023-12-13

## 2023-12-14 ENCOUNTER — OFFICE VISIT (OUTPATIENT)
Dept: BARIATRICS/WEIGHT MGMT | Age: 68
End: 2023-12-14
Payer: MEDICARE

## 2023-12-14 VITALS
OXYGEN SATURATION: 95 % | SYSTOLIC BLOOD PRESSURE: 138 MMHG | BODY MASS INDEX: 42.25 KG/M2 | WEIGHT: 229.6 LBS | HEART RATE: 103 BPM | RESPIRATION RATE: 18 BRPM | DIASTOLIC BLOOD PRESSURE: 80 MMHG | HEIGHT: 62 IN

## 2023-12-14 DIAGNOSIS — E66.01 MORBID OBESITY WITH BMI OF 40.0-44.9, ADULT (HCC): Primary | ICD-10-CM

## 2023-12-14 DIAGNOSIS — I10 ESSENTIAL HYPERTENSION: ICD-10-CM

## 2023-12-14 DIAGNOSIS — E11.69 DIABETES MELLITUS TYPE 2 IN OBESE (HCC): ICD-10-CM

## 2023-12-14 DIAGNOSIS — E66.9 DIABETES MELLITUS TYPE 2 IN OBESE (HCC): ICD-10-CM

## 2023-12-14 DIAGNOSIS — K21.9 CHRONIC GERD: ICD-10-CM

## 2023-12-14 PROBLEM — G47.33 OBSTRUCTIVE SLEEP APNEA: Status: ACTIVE | Noted: 2023-12-14

## 2023-12-14 PROCEDURE — 3044F HG A1C LEVEL LT 7.0%: CPT | Performed by: SURGERY

## 2023-12-14 PROCEDURE — 3075F SYST BP GE 130 - 139MM HG: CPT | Performed by: SURGERY

## 2023-12-14 PROCEDURE — 3079F DIAST BP 80-89 MM HG: CPT | Performed by: SURGERY

## 2023-12-14 PROCEDURE — 1123F ACP DISCUSS/DSCN MKR DOCD: CPT | Performed by: SURGERY

## 2023-12-14 PROCEDURE — 99205 OFFICE O/P NEW HI 60 MIN: CPT | Performed by: SURGERY

## 2023-12-14 NOTE — PATIENT INSTRUCTIONS
Patient received dietary handouts and education.       -Plan for Laparoscopic sleeve gastrectomy      Pre-operative work up Ordered:    - F/U in 4 weeks. - Nutrition Labs. - Protein Shake Trial.  - Psych Evaluation.   - Cardiac Clearance. - EGD (endoscopy to check your stomach). - Support Group Attendance. - Obtain letter of medical necessity (PCP Letter). - Quit Smoking,  Alcohol, Caffeine and Carbonated Drinks  - Obtain records for Weight History 2 yrs. - Start Regular Exercise and track your activities. - Start Tracking your food Intake and follow dietary guidelines. - Referral to Behaviorist.           Patient advised that its their responsibility to follow up for studies, referrals and/or labs ordered today.

## 2024-01-08 ENCOUNTER — OFFICE VISIT (OUTPATIENT)
Dept: FAMILY MEDICINE CLINIC | Age: 69
End: 2024-01-08
Payer: MEDICARE

## 2024-01-08 VITALS
HEIGHT: 61 IN | SYSTOLIC BLOOD PRESSURE: 126 MMHG | DIASTOLIC BLOOD PRESSURE: 72 MMHG | HEART RATE: 88 BPM | BODY MASS INDEX: 42.48 KG/M2 | OXYGEN SATURATION: 99 % | WEIGHT: 225 LBS | TEMPERATURE: 97 F

## 2024-01-08 DIAGNOSIS — Z00.00 MEDICARE ANNUAL WELLNESS VISIT, SUBSEQUENT: Primary | ICD-10-CM

## 2024-01-08 DIAGNOSIS — F33.1 MODERATE EPISODE OF RECURRENT MAJOR DEPRESSIVE DISORDER (HCC): ICD-10-CM

## 2024-01-08 DIAGNOSIS — I50.32 CHRONIC DIASTOLIC (CONGESTIVE) HEART FAILURE (HCC): ICD-10-CM

## 2024-01-08 DIAGNOSIS — J84.9 ILD (INTERSTITIAL LUNG DISEASE) (HCC): ICD-10-CM

## 2024-01-08 DIAGNOSIS — Z12.11 COLON CANCER SCREENING: ICD-10-CM

## 2024-01-08 DIAGNOSIS — E66.9 DIABETES MELLITUS TYPE 2 IN OBESE (HCC): ICD-10-CM

## 2024-01-08 DIAGNOSIS — Z78.0 POST-MENOPAUSAL: ICD-10-CM

## 2024-01-08 DIAGNOSIS — G25.0 ESSENTIAL TREMOR: ICD-10-CM

## 2024-01-08 DIAGNOSIS — I50.31 ACUTE HEART FAILURE WITH PRESERVED EJECTION FRACTION (HCC): ICD-10-CM

## 2024-01-08 DIAGNOSIS — Z12.31 ENCOUNTER FOR SCREENING MAMMOGRAM FOR MALIGNANT NEOPLASM OF BREAST: ICD-10-CM

## 2024-01-08 DIAGNOSIS — E66.01 OBESITY, CLASS III, BMI 40-49.9 (MORBID OBESITY) (HCC): ICD-10-CM

## 2024-01-08 DIAGNOSIS — E11.69 DIABETES MELLITUS TYPE 2 IN OBESE (HCC): ICD-10-CM

## 2024-01-08 PROCEDURE — G0439 PPPS, SUBSEQ VISIT: HCPCS | Performed by: PHYSICIAN ASSISTANT

## 2024-01-08 PROCEDURE — 99214 OFFICE O/P EST MOD 30 MIN: CPT | Performed by: PHYSICIAN ASSISTANT

## 2024-01-08 PROCEDURE — 3078F DIAST BP <80 MM HG: CPT | Performed by: PHYSICIAN ASSISTANT

## 2024-01-08 PROCEDURE — 3074F SYST BP LT 130 MM HG: CPT | Performed by: PHYSICIAN ASSISTANT

## 2024-01-08 PROCEDURE — 1123F ACP DISCUSS/DSCN MKR DOCD: CPT | Performed by: PHYSICIAN ASSISTANT

## 2024-01-08 ASSESSMENT — ENCOUNTER SYMPTOMS
ABDOMINAL PAIN: 0
COUGH: 0
RHINORRHEA: 0
CONSTIPATION: 0
NAUSEA: 0
DIARRHEA: 0
SORE THROAT: 0
VOMITING: 0
SHORTNESS OF BREATH: 0

## 2024-01-08 ASSESSMENT — LIFESTYLE VARIABLES
HOW OFTEN DO YOU HAVE A DRINK CONTAINING ALCOHOL: NEVER
HOW MANY STANDARD DRINKS CONTAINING ALCOHOL DO YOU HAVE ON A TYPICAL DAY: PATIENT DOES NOT DRINK

## 2024-01-08 ASSESSMENT — PATIENT HEALTH QUESTIONNAIRE - PHQ9
4. FEELING TIRED OR HAVING LITTLE ENERGY: 3
SUM OF ALL RESPONSES TO PHQ QUESTIONS 1-9: 12
7. TROUBLE CONCENTRATING ON THINGS, SUCH AS READING THE NEWSPAPER OR WATCHING TELEVISION: 0
2. FEELING DOWN, DEPRESSED OR HOPELESS: 0
SUM OF ALL RESPONSES TO PHQ QUESTIONS 1-9: 12
SUM OF ALL RESPONSES TO PHQ QUESTIONS 1-9: 12
6. FEELING BAD ABOUT YOURSELF - OR THAT YOU ARE A FAILURE OR HAVE LET YOURSELF OR YOUR FAMILY DOWN: 1
10. IF YOU CHECKED OFF ANY PROBLEMS, HOW DIFFICULT HAVE THESE PROBLEMS MADE IT FOR YOU TO DO YOUR WORK, TAKE CARE OF THINGS AT HOME, OR GET ALONG WITH OTHER PEOPLE: 0
3. TROUBLE FALLING OR STAYING ASLEEP: 3
SUM OF ALL RESPONSES TO PHQ9 QUESTIONS 1 & 2: 3
8. MOVING OR SPEAKING SO SLOWLY THAT OTHER PEOPLE COULD HAVE NOTICED. OR THE OPPOSITE, BEING SO FIGETY OR RESTLESS THAT YOU HAVE BEEN MOVING AROUND A LOT MORE THAN USUAL: 0
1. LITTLE INTEREST OR PLEASURE IN DOING THINGS: 3
5. POOR APPETITE OR OVEREATING: 2
9. THOUGHTS THAT YOU WOULD BE BETTER OFF DEAD, OR OF HURTING YOURSELF: 0
SUM OF ALL RESPONSES TO PHQ QUESTIONS 1-9: 12

## 2024-01-08 NOTE — PROGRESS NOTES
Medicare Annual Wellness Visit    Estrella Whiting is here for Medicare AWV (And 6 month follow up )    Assessment & Plan   Medicare annual wellness visit, subsequent  -     DEXA BONE DENSITY AXIAL SKELETON; Future  -     [facility] DILMA DIGITAL SCREEN BILATERAL; Future  -     Chelsea Villa MD, Gastroenterology, [facility]  - screenings ordered  - labs due, pending from Dr. Logan     Post-menopausal  -     DEXA BONE DENSITY AXIAL SKELETON; Future  Encounter for screening mammogram for malignant neoplasm of breast  -     [facility] DILMA DIGITAL SCREEN BILATERAL; Future  Colon cancer screening  -     Chelsea Villa MD, Gastroenterology, [facility]    Recommendations for Preventive Services Due: see orders and patient instructions/AVS.  Recommended screening schedule for the next 5-10 years is provided to the patient in written form: see Patient Instructions/AVS.     No follow-ups on file.     Subjective       Patient's complete Health Risk Assessment and screening values have been reviewed and are found in Flowsheets. The following problems were reviewed today and where indicated follow up appointments were made and/or referrals ordered.    Positive Risk Factor Screenings with Interventions:        Depression:  PHQ-2 Score: 3  PHQ-9 Total Score: 12    Interpretation:  5-9 mild   10-14 moderate   15-19 moderately severe   20-27 severe   Interventions:  Patient declines any further evaluation or treatment          General HRA Questions:  Select all that apply: (!) New or Increased Fatigue    Fatigue Interventions:  Labs pending      Activity, Diet, and Weight:          Do you eat balanced/healthy meals regularly?: Yes    Body mass index is 41.83 kg/m². (!) Abnormal    Obesity Interventions:  Walks dog several times daily, encouraged with chair exercises.              Hearing Screen:  Do you or your family notice any trouble with your hearing that hasn't been managed with hearing aids?: (!)

## 2024-01-08 NOTE — PROGRESS NOTES
2024  Estrella Whiting (: 1955)  68 y.o.    ASSESSMENT and PLAN:  Estrella was seen today for medicare awv.    Diagnoses and all orders for this visit:    Medicare annual wellness visit, subsequent  -     DEXA BONE DENSITY AXIAL SKELETON; Future  -     [facility] DILMA DIGITAL SCREEN BILATERAL; Future  -     Chelsea Villa MD, Gastroenterology, [facility]    Post-menopausal  -     DEXA BONE DENSITY AXIAL SKELETON; Future    Chronic diastolic (congestive) heart failure (HCC)    Obesity, Class III, BMI 40-49.9 (morbid obesity) (HCC)  - following with bariatric sxs    Chronic heart failure with preserved ejection fraction (HCC)  - Following with cardiology   - cardizem recently increased,     Moderate episode of recurrent major depressive disorder (HCC)  - stable on cymbalta     Diabetes mellitus type 2 in obese (HCC)  -     Diabetic Foot Exam  -     metFORMIN (GLUCOPHAGE) 500 MG tablet; Take 1 tablet by mouth daily (with breakfast)  - new Diabetes education provided today:    Diabetes pathoetiology, difference between type 1 and type 2 diabetes, and progressive nature of Type 2 DM.  Nutrition as a mainstream of diabetes therapy. [place] about label reading.  Carbs: good carbs and bad carbs, importance of carb counting, incorporation of protein with each meal to reduce Glycemic index, importance of portions, Carb/insulin ratio.      ILD (interstitial lung disease) (HCC)  - following with pulm     Encounter for screening mammogram for malignant neoplasm of breast  -     [facility] DILMA DIGITAL SCREEN BILATERAL; Future    Colon cancer screening  -     Chelsea Villa MD, Gastroenterology, [facility]    Essential tremor  - check labs, if negative will trial mirapex.     Other orders  -     Cancel: MICROALBUMIN / CREATININE URINE RATIO      Return in about 3 months (around 2024) for Diabetes Mellitus & tremor.    HPI    Started with shaking in bilateral hands about 3-4 months ago  Gradually

## 2024-01-10 ENCOUNTER — HOSPITAL ENCOUNTER (OUTPATIENT)
Age: 69
Discharge: HOME | End: 2024-01-10
Payer: MEDICARE

## 2024-01-10 DIAGNOSIS — E66.01 MORBID OBESITY WITH BMI OF 40.0-44.9, ADULT (HCC): ICD-10-CM

## 2024-01-10 DIAGNOSIS — E66.9 DIABETES MELLITUS TYPE 2 IN OBESE (HCC): ICD-10-CM

## 2024-01-10 DIAGNOSIS — I10 ESSENTIAL HYPERTENSION: ICD-10-CM

## 2024-01-10 DIAGNOSIS — K21.9 CHRONIC GERD: ICD-10-CM

## 2024-01-10 DIAGNOSIS — E11.69 DIABETES MELLITUS TYPE 2 IN OBESE (HCC): ICD-10-CM

## 2024-01-10 LAB
25(OH)D3 SERPL-MCNC: 20.8 NG/ML
ALBUMIN SERPL-MCNC: 4.3 G/DL (ref 3.4–5)
ALBUMIN/GLOB SERPL: 1.3 {RATIO} (ref 1.1–2.2)
ALP SERPL-CCNC: 124 U/L (ref 40–129)
ALT SERPL-CCNC: 34 U/L (ref 10–40)
ANION GAP SERPL CALCULATED.3IONS-SCNC: 15 MMOL/L (ref 3–16)
AST SERPL-CCNC: 23 U/L (ref 15–37)
BASOPHILS # BLD: 0.1 K/UL (ref 0–0.2)
BASOPHILS NFR BLD: 0.8 %
BILIRUB SERPL-MCNC: 0.3 MG/DL (ref 0–1)
BUN SERPL-MCNC: 20 MG/DL (ref 7–20)
CALCIUM SERPL-MCNC: 9.5 MG/DL (ref 8.3–10.6)
CHLORIDE SERPL-SCNC: 102 MMOL/L (ref 99–110)
CHOLEST SERPL-MCNC: 100 MG/DL (ref 0–199)
CO2 SERPL-SCNC: 25 MMOL/L (ref 21–32)
CREAT SERPL-MCNC: 0.7 MG/DL (ref 0.6–1.2)
DEPRECATED RDW RBC AUTO: 15.7 % (ref 12.4–15.4)
EOSINOPHIL # BLD: 0.1 K/UL (ref 0–0.6)
EOSINOPHIL NFR BLD: 1.9 %
FOLATE SERPL-MCNC: 8.4 NG/ML (ref 4.78–24.2)
GFR SERPLBLD CREATININE-BSD FMLA CKD-EPI: >60 ML/MIN/{1.73_M2}
GLUCOSE SERPL-MCNC: 128 MG/DL (ref 70–99)
HCT VFR BLD AUTO: 41.7 % (ref 36–48)
HDLC SERPL-MCNC: 45 MG/DL (ref 40–60)
HGB BLD-MCNC: 13.3 G/DL (ref 12–16)
INR PPP: 1.01 (ref 0.84–1.16)
IRON SATN MFR SERPL: 23 % (ref 15–50)
IRON SERPL-MCNC: 67 UG/DL (ref 37–145)
LDLC SERPL CALC-MCNC: 39 MG/DL
LYMPHOCYTES # BLD: 1.5 K/UL (ref 1–5.1)
LYMPHOCYTES NFR BLD: 20.1 %
MCH RBC QN AUTO: 27 PG (ref 26–34)
MCHC RBC AUTO-ENTMCNC: 32 G/DL (ref 31–36)
MCV RBC AUTO: 84.5 FL (ref 80–100)
MONOCYTES # BLD: 0.5 K/UL (ref 0–1.3)
MONOCYTES NFR BLD: 6.6 %
NEUTROPHILS # BLD: 5.1 K/UL (ref 1.7–7.7)
NEUTROPHILS NFR BLD: 70.6 %
PLATELET # BLD AUTO: 256 K/UL (ref 135–450)
PMV BLD AUTO: 8.9 FL (ref 5–10.5)
POTASSIUM SERPL-SCNC: 4.2 MMOL/L (ref 3.5–5.1)
PROT SERPL-MCNC: 7.6 G/DL (ref 6.4–8.2)
PROTHROMBIN TIME: 13.3 SEC (ref 11.5–14.8)
RBC # BLD AUTO: 4.94 M/UL (ref 4–5.2)
SODIUM SERPL-SCNC: 142 MMOL/L (ref 136–145)
TIBC SERPL-MCNC: 297 UG/DL (ref 260–445)
TRIGL SERPL-MCNC: 78 MG/DL (ref 0–150)
TSH SERPL DL<=0.005 MIU/L-ACNC: 2.15 UIU/ML (ref 0.27–4.2)
VIT B12 SERPL-MCNC: 389 PG/ML (ref 211–911)
VLDLC SERPL CALC-MCNC: 16 MG/DL
WBC # BLD AUTO: 7.3 K/UL (ref 4–11)

## 2024-01-10 PROCEDURE — 82746 ASSAY OF FOLIC ACID SERUM: CPT

## 2024-01-10 PROCEDURE — 84446 ASSAY OF VITAMIN E: CPT

## 2024-01-10 PROCEDURE — 85025 COMPLETE CBC W/AUTO DIFF WBC: CPT

## 2024-01-10 PROCEDURE — 84443 ASSAY THYROID STIM HORMONE: CPT

## 2024-01-10 PROCEDURE — 82306 VITAMIN D 25 HYDROXY: CPT

## 2024-01-10 PROCEDURE — 84425 ASSAY OF VITAMIN B-1: CPT

## 2024-01-10 PROCEDURE — 84590 ASSAY OF VITAMIN A: CPT

## 2024-01-10 PROCEDURE — 80061 LIPID PANEL: CPT

## 2024-01-10 PROCEDURE — 82607 VITAMIN B-12: CPT

## 2024-01-10 PROCEDURE — 83036 HEMOGLOBIN GLYCOSYLATED A1C: CPT

## 2024-01-10 PROCEDURE — 83540 ASSAY OF IRON: CPT

## 2024-01-10 PROCEDURE — 83550 IRON BINDING TEST: CPT

## 2024-01-10 PROCEDURE — 80053 COMPREHEN METABOLIC PANEL: CPT

## 2024-01-10 PROCEDURE — 36415 COLL VENOUS BLD VENIPUNCTURE: CPT

## 2024-01-10 PROCEDURE — 85610 PROTHROMBIN TIME: CPT

## 2024-01-11 LAB
EST. AVERAGE GLUCOSE BLD GHB EST-MCNC: 145.6 MG/DL
HBA1C MFR BLD: 6.7 %

## 2024-01-13 LAB
A-TOCOPHEROL VIT E SERPL-MCNC: 6.8 MG/L (ref 5.5–18)
ANNOTATION COMMENT IMP: NORMAL
BETA+GAMMA TOCOPHEROL SERPL-MCNC: 1.6 MG/L (ref 0–6)
RETINYL PALMITATE SERPL-MCNC: <0.02 MG/L (ref 0–0.1)
VIT A SERPL-MCNC: 0.63 MG/L (ref 0.3–1.2)
VIT B1 BLD-MCNC: 111 NMOL/L (ref 70–180)

## 2024-01-25 ENCOUNTER — OFFICE VISIT (OUTPATIENT)
Dept: BARIATRICS/WEIGHT MGMT | Age: 69
End: 2024-01-25
Payer: MEDICARE

## 2024-01-25 VITALS
HEART RATE: 117 BPM | BODY MASS INDEX: 41.22 KG/M2 | HEIGHT: 62 IN | RESPIRATION RATE: 18 BRPM | WEIGHT: 224 LBS | SYSTOLIC BLOOD PRESSURE: 126 MMHG | OXYGEN SATURATION: 97 % | DIASTOLIC BLOOD PRESSURE: 76 MMHG

## 2024-01-25 DIAGNOSIS — E66.9 DIABETES MELLITUS TYPE 2 IN OBESE (HCC): ICD-10-CM

## 2024-01-25 DIAGNOSIS — I10 ESSENTIAL HYPERTENSION: Primary | ICD-10-CM

## 2024-01-25 DIAGNOSIS — E66.01 MORBID OBESITY WITH BMI OF 40.0-44.9, ADULT (HCC): ICD-10-CM

## 2024-01-25 DIAGNOSIS — E11.69 DIABETES MELLITUS TYPE 2 IN OBESE (HCC): ICD-10-CM

## 2024-01-25 DIAGNOSIS — G47.33 OBSTRUCTIVE SLEEP APNEA: ICD-10-CM

## 2024-01-25 DIAGNOSIS — K21.9 CHRONIC GERD: ICD-10-CM

## 2024-01-25 PROCEDURE — 1123F ACP DISCUSS/DSCN MKR DOCD: CPT | Performed by: SURGERY

## 2024-01-25 PROCEDURE — 3074F SYST BP LT 130 MM HG: CPT | Performed by: SURGERY

## 2024-01-25 PROCEDURE — 99214 OFFICE O/P EST MOD 30 MIN: CPT | Performed by: SURGERY

## 2024-01-25 PROCEDURE — 3044F HG A1C LEVEL LT 7.0%: CPT | Performed by: SURGERY

## 2024-01-25 PROCEDURE — 3078F DIAST BP <80 MM HG: CPT | Performed by: SURGERY

## 2024-01-25 NOTE — PROGRESS NOTES
Estrella Whiting lost 5.6 lbs over past ~month.  Pt states she started metformin 2 weeks ago and it has curb her appetite.    Is pt eating at least 4 times everyday? 3-4x/day, could improve on produce intake, will have an orange most days / discussed using frozen vegetables    Is pt eating a lean protein source with all meals and snacks? yes - chicken / some seafood / eggs / cheese    Has pt decreased their portions using the plate method?  yes    Is pt choosing low fat/sugar free options?  yes- as a rule    Is pt drinking at least 64 oz of clear liquids everyday? yes - water    Has pt stopped drinking carbonation, caffeinated, and sugar sweetened beverages?  diet tea    Has pt sampled Un[place] and/or Nectar protein?  discussed, to try    Has pt attended a support group? Not scheduled - has schedule to let us know    Participating in intentional exercise? yes - walks her dog several times a day, averages 09341 steps daily, up to [address]2525 in the summer    Plan/Recommendations:   - Focus on lean protein 4x/day  - Eliminate caffeine  - Try protein powder  - Complete Support Group    Handouts: SG schedule    Megha Ponce, JONI, LD

## 2024-01-25 NOTE — PROGRESS NOTES
[facility]   [employer] [facility]  Amilcar Logan MD, FACS, [address], [address]    [facility] [address] .  Phone: [phone]  Fax: [phone]          Chief Complaint   Patient presents with    Obesity     2nd pre-surg         HPI:     Estrella Whiting is a very pleasant 68 y.o. female with Body mass index is 41.64 kg/m². / Chronic Obesity.     Estrella has been struggling for several years now with obesity. Estrella feels the weight is an obstacle to achieve and perform things in daily living as well risk on health.     Patient  is very determined to lose weight and be healthy, and is working towards  surgical weight loss to achieve this goal. Pre-operative clearance and work up pending. Working hard to keep good dietary habits as well level of activity.  Patient denies any nausea, vomiting, fevers, chills, shortness of breath, chest pain, cough, constipation or difficulty urinating.    Pain Assessment   Denies any abdominal pain       Past Medical History:   Diagnosis Date    Colitis     Diverticulosis     MVP (mitral valve prolapse)     Spastic colon     Urinary incontinence      Past Surgical History:   Procedure Laterality Date    APPENDECTOMY      BREAST ENHANCEMENT SURGERY  1973    x3- all breast tissue removed then implants    CARPAL TUNNEL RELEASE      COLONOSCOPY  08/17/2017    polyps    HYSTERECTOMY (CERVIX STATUS UNKNOWN)      INNER EAR SURGERY      JOINT REPLACEMENT Right     Knee    TONSILLECTOMY      UPPER GASTROINTESTINAL ENDOSCOPY  08/17/2017     Family History   Problem Relation Age of Onset    Heart Disease Mother     High Blood Pressure Mother     Heart Disease Father     High Blood Pressure Father     Cancer Sister     Heart Disease Maternal Grandfather      Social History     Tobacco Use    Smoking status: Former     Current packs/day: 0.00     Average packs/day: 1 pack/day for 32.0 years (32.0 ttl pk-yrs)     Types: Cigarettes     Start date: 6/1/1989

## 2024-02-05 ENCOUNTER — HOSPITAL ENCOUNTER (OUTPATIENT)
Dept: ULTRASOUND IMAGING | Age: 69
Discharge: HOME | End: 2024-02-05
Payer: MEDICARE

## 2024-02-05 ENCOUNTER — HOSPITAL ENCOUNTER (OUTPATIENT)
Dept: GENERAL RADIOLOGY | Age: 69
Discharge: HOME | End: 2024-02-05
Payer: MEDICARE

## 2024-02-05 ENCOUNTER — TELEPHONE (OUTPATIENT)
Dept: FAMILY MEDICINE CLINIC | Age: 69
End: 2024-02-05

## 2024-02-05 ENCOUNTER — HOSPITAL ENCOUNTER (OUTPATIENT)
Dept: MAMMOGRAPHY | Age: 69
Discharge: HOME | End: 2024-02-05
Payer: MEDICARE

## 2024-02-05 DIAGNOSIS — N63.20 MASS OF LEFT BREAST, UNSPECIFIED QUADRANT: ICD-10-CM

## 2024-02-05 DIAGNOSIS — N64.4 MASTODYNIA: ICD-10-CM

## 2024-02-05 DIAGNOSIS — Z12.31 ENCOUNTER FOR SCREENING MAMMOGRAM FOR MALIGNANT NEOPLASM OF BREAST: ICD-10-CM

## 2024-02-05 DIAGNOSIS — N63.20 MASS OF LEFT BREAST, UNSPECIFIED QUADRANT: Primary | ICD-10-CM

## 2024-02-05 DIAGNOSIS — Z00.00 MEDICARE ANNUAL WELLNESS VISIT, SUBSEQUENT: ICD-10-CM

## 2024-02-05 DIAGNOSIS — Z78.0 POST-MENOPAUSAL: ICD-10-CM

## 2024-02-05 PROCEDURE — 77080 DXA BONE DENSITY AXIAL: CPT

## 2024-02-05 PROCEDURE — 76642 ULTRASOUND BREAST LIMITED: CPT

## 2024-02-05 PROCEDURE — G0279 TOMOSYNTHESIS, MAMMO: HCPCS

## 2024-02-05 NOTE — TELEPHONE ENCOUNTER
Dania Min calling because patient is there to get a Celestino and dexa scan done, but patient stated that she has a lump on her left breast. Pako is asking for Yi to order a bilateral  diagnostic celestino and a US of her left breast.

## 2024-02-12 ENCOUNTER — ANESTHESIA (OUTPATIENT)
Dept: ENDOSCOPY | Age: 69
End: 2024-02-12
Payer: MEDICARE

## 2024-02-12 ENCOUNTER — ANESTHESIA EVENT (OUTPATIENT)
Dept: ENDOSCOPY | Age: 69
End: 2024-02-12
Payer: MEDICARE

## 2024-02-12 ENCOUNTER — HOSPITAL ENCOUNTER (OUTPATIENT)
Age: 69
Setting detail: OUTPATIENT SURGERY
Discharge: HOME | End: 2024-02-12
Attending: INTERNAL MEDICINE | Admitting: INTERNAL MEDICINE
Payer: MEDICARE

## 2024-02-12 VITALS
HEIGHT: 62 IN | OXYGEN SATURATION: 95 % | RESPIRATION RATE: 18 BRPM | SYSTOLIC BLOOD PRESSURE: 150 MMHG | BODY MASS INDEX: 40.48 KG/M2 | DIASTOLIC BLOOD PRESSURE: 74 MMHG | TEMPERATURE: 97.3 F | HEART RATE: 77 BPM | WEIGHT: 220 LBS

## 2024-02-12 DIAGNOSIS — Z86.010 HX OF COLONIC POLYPS: ICD-10-CM

## 2024-02-12 LAB
GLUCOSE BLD-MCNC: 108 MG/DL (ref 70–99)
GLUCOSE BLD-MCNC: 115 MG/DL (ref 70–99)
PERFORMED ON: ABNORMAL
PERFORMED ON: ABNORMAL

## 2024-02-12 PROCEDURE — 7100000011 HC PHASE II RECOVERY - ADDTL 15 MIN: Performed by: INTERNAL MEDICINE

## 2024-02-12 PROCEDURE — 3700000000 HC ANESTHESIA ATTENDED CARE: Performed by: INTERNAL MEDICINE

## 2024-02-12 PROCEDURE — 3609017700 HC EGD DILATION GASTRIC/DUODENAL STRICTURE: Performed by: INTERNAL MEDICINE

## 2024-02-12 PROCEDURE — 6360000002 HC RX W HCPCS: Performed by: NURSE ANESTHETIST, CERTIFIED REGISTERED

## 2024-02-12 PROCEDURE — 88305 TISSUE EXAM BY PATHOLOGIST: CPT

## 2024-02-12 PROCEDURE — 3609012400 HC EGD TRANSORAL BIOPSY SINGLE/MULTIPLE: Performed by: INTERNAL MEDICINE

## 2024-02-12 PROCEDURE — 7100000010 HC PHASE II RECOVERY - FIRST 15 MIN: Performed by: INTERNAL MEDICINE

## 2024-02-12 PROCEDURE — 3700000001 HC ADD 15 MINUTES (ANESTHESIA): Performed by: INTERNAL MEDICINE

## 2024-02-12 PROCEDURE — 2500000003 HC RX 250 WO HCPCS: Performed by: NURSE ANESTHETIST, CERTIFIED REGISTERED

## 2024-02-12 PROCEDURE — 2580000003 HC RX 258: Performed by: ANESTHESIOLOGY

## 2024-02-12 PROCEDURE — 2709999900 HC NON-CHARGEABLE SUPPLY: Performed by: INTERNAL MEDICINE

## 2024-02-12 PROCEDURE — C1726 CATH, BAL DIL, NON-VASCULAR: HCPCS | Performed by: INTERNAL MEDICINE

## 2024-02-12 PROCEDURE — 3609010600 HC COLONOSCOPY POLYPECTOMY SNARE/COLD BIOPSY: Performed by: INTERNAL MEDICINE

## 2024-02-12 RX ORDER — ONDANSETRON 2 MG/ML
INJECTION INTRAMUSCULAR; INTRAVENOUS PRN
Status: DISCONTINUED | OUTPATIENT
Start: 2024-02-12 | End: 2024-02-12 | Stop reason: SDUPTHER

## 2024-02-12 RX ORDER — LIDOCAINE HYDROCHLORIDE 10 MG/ML
0.3 INJECTION, SOLUTION EPIDURAL; INFILTRATION; INTRACAUDAL; PERINEURAL
Status: DISCONTINUED | OUTPATIENT
Start: 2024-02-12 | End: 2024-02-12 | Stop reason: HOSPADM

## 2024-02-12 RX ORDER — SODIUM CHLORIDE 0.9 % (FLUSH) 0.9 %
5-40 SYRINGE (ML) INJECTION PRN
Status: CANCELLED | OUTPATIENT
Start: 2024-02-12

## 2024-02-12 RX ORDER — DEXAMETHASONE SODIUM PHOSPHATE 4 MG/ML
INJECTION, SOLUTION INTRA-ARTICULAR; INTRALESIONAL; INTRAMUSCULAR; INTRAVENOUS; SOFT TISSUE PRN
Status: DISCONTINUED | OUTPATIENT
Start: 2024-02-12 | End: 2024-02-12 | Stop reason: SDUPTHER

## 2024-02-12 RX ORDER — ONDANSETRON 2 MG/ML
4 INJECTION INTRAMUSCULAR; INTRAVENOUS
Status: CANCELLED | OUTPATIENT
Start: 2024-02-12 | End: 2024-02-13

## 2024-02-12 RX ORDER — SODIUM CHLORIDE 0.9 % (FLUSH) 0.9 %
5-40 SYRINGE (ML) INJECTION PRN
Status: DISCONTINUED | OUTPATIENT
Start: 2024-02-12 | End: 2024-02-12 | Stop reason: HOSPADM

## 2024-02-12 RX ORDER — SODIUM CHLORIDE 9 MG/ML
INJECTION, SOLUTION INTRAVENOUS PRN
Status: DISCONTINUED | OUTPATIENT
Start: 2024-02-12 | End: 2024-02-12 | Stop reason: HOSPADM

## 2024-02-12 RX ORDER — MEPERIDINE HYDROCHLORIDE 25 MG/ML
12.5 INJECTION INTRAMUSCULAR; INTRAVENOUS; SUBCUTANEOUS EVERY 5 MIN PRN
Status: CANCELLED | OUTPATIENT
Start: 2024-02-12

## 2024-02-12 RX ORDER — SODIUM CHLORIDE 9 MG/ML
INJECTION, SOLUTION INTRAVENOUS PRN
Status: CANCELLED | OUTPATIENT
Start: 2024-02-12

## 2024-02-12 RX ORDER — DIPHENHYDRAMINE HYDROCHLORIDE 50 MG/ML
12.5 INJECTION INTRAMUSCULAR; INTRAVENOUS
Status: CANCELLED | OUTPATIENT
Start: 2024-02-12 | End: 2024-02-13

## 2024-02-12 RX ORDER — LIDOCAINE HYDROCHLORIDE 20 MG/ML
INJECTION, SOLUTION INFILTRATION; PERINEURAL PRN
Status: DISCONTINUED | OUTPATIENT
Start: 2024-02-12 | End: 2024-02-12 | Stop reason: SDUPTHER

## 2024-02-12 RX ORDER — SODIUM CHLORIDE 0.9 % (FLUSH) 0.9 %
5-40 SYRINGE (ML) INJECTION EVERY 12 HOURS SCHEDULED
Status: CANCELLED | OUTPATIENT
Start: 2024-02-12

## 2024-02-12 RX ORDER — PROPOFOL 10 MG/ML
INJECTION, EMULSION INTRAVENOUS PRN
Status: DISCONTINUED | OUTPATIENT
Start: 2024-02-12 | End: 2024-02-12 | Stop reason: SDUPTHER

## 2024-02-12 RX ORDER — GLYCOPYRROLATE 0.2 MG/ML
INJECTION INTRAMUSCULAR; INTRAVENOUS PRN
Status: DISCONTINUED | OUTPATIENT
Start: 2024-02-12 | End: 2024-02-12 | Stop reason: SDUPTHER

## 2024-02-12 RX ORDER — SODIUM CHLORIDE 0.9 % (FLUSH) 0.9 %
5-40 SYRINGE (ML) INJECTION EVERY 12 HOURS SCHEDULED
Status: DISCONTINUED | OUTPATIENT
Start: 2024-02-12 | End: 2024-02-12 | Stop reason: HOSPADM

## 2024-02-12 RX ORDER — LABETALOL HYDROCHLORIDE 5 MG/ML
10 INJECTION, SOLUTION INTRAVENOUS
Status: CANCELLED | OUTPATIENT
Start: 2024-02-12

## 2024-02-12 RX ORDER — OXYCODONE HYDROCHLORIDE 5 MG/1
5 TABLET ORAL PRN
Status: CANCELLED | OUTPATIENT
Start: 2024-02-12 | End: 2024-02-12

## 2024-02-12 RX ORDER — OXYCODONE HYDROCHLORIDE 5 MG/1
10 TABLET ORAL PRN
Status: CANCELLED | OUTPATIENT
Start: 2024-02-12 | End: 2024-02-12

## 2024-02-12 RX ORDER — SODIUM CHLORIDE, SODIUM LACTATE, POTASSIUM CHLORIDE, CALCIUM CHLORIDE 600; 310; 30; 20 MG/100ML; MG/100ML; MG/100ML; MG/100ML
INJECTION, SOLUTION INTRAVENOUS CONTINUOUS
Status: DISCONTINUED | OUTPATIENT
Start: 2024-02-12 | End: 2024-02-12 | Stop reason: HOSPADM

## 2024-02-12 RX ADMIN — SODIUM CHLORIDE, POTASSIUM CHLORIDE, SODIUM LACTATE AND CALCIUM CHLORIDE: 600; 310; 30; 20 INJECTION, SOLUTION INTRAVENOUS at 08:01

## 2024-02-12 RX ADMIN — GLYCOPYRROLATE 0.1 MG: 0.2 INJECTION, SOLUTION INTRAMUSCULAR; INTRAVENOUS at 08:07

## 2024-02-12 RX ADMIN — LIDOCAINE HYDROCHLORIDE 80 MG: 20 INJECTION, SOLUTION INFILTRATION; PERINEURAL at 08:07

## 2024-02-12 RX ADMIN — PROPOFOL 240 MG: 10 INJECTION, EMULSION INTRAVENOUS at 08:07

## 2024-02-12 RX ADMIN — PROPOFOL 40 MG: 10 INJECTION, EMULSION INTRAVENOUS at 08:50

## 2024-02-12 RX ADMIN — DEXAMETHASONE SODIUM PHOSPHATE 6 MG: 4 INJECTION, SOLUTION INTRAMUSCULAR; INTRAVENOUS at 08:07

## 2024-02-12 RX ADMIN — ONDANSETRON HYDROCHLORIDE 4 MG: 2 INJECTION, SOLUTION INTRAMUSCULAR; INTRAVENOUS at 08:07

## 2024-02-12 RX ADMIN — PROPOFOL 260 MG: 10 INJECTION, EMULSION INTRAVENOUS at 08:24

## 2024-02-12 NOTE — PROCEDURES
EGD and Colonoscopy Procedure  Note            Patient: Estrella Whiting  : 1955  CSN:     Procedure: Esophagogastroduodenoscopy with colonoscopy    Date:  2024     Surgeon:  Chelsea Knowles MD     Referring Provider:  Yi Weinstein    Preoperative Diagnosis:  Dysphagia, history of polyp removal    Postoperative Diagnosis:  Colon polyps removed, esophageal stricture dilated    Anesthesia:  propofol    EBL: <50 mL    Indications: This is a 68 y.o. year old female who presents today with New-onset odynophagia.  History of colon polyps removed      Procedure Details:    With the patient in left lateral position the endoscope was passed through the hypopharynx into the esophagus. The scope was advanced all the way to the duodenum.  The mucosa in the duodenal bulb appeared erythematous, biopsies were taken to rule out celiac disease.  The post bulbar region in the descending duodenum appeared normal.  The mucosa in the antrum appeared erythematous, biopsies were taken to rule Hpylori bacteria.   The mucosa in the remaining part of the stomach and retroflexion appeared normal.  The GE junction was at around  36cms.  Mild peptic stricturing was seen, this was dilated with a CRE balloon to 20mm with good tissue effect.  Mild esophagitis was seen, biopsies were taken to rule out rinaldi's esophagitis.  The mucosa in the remainder of the esophagus appeared normal.  The scope was withdrawn and the procedure was terminated.    Gastric or Duodenal ulcer present: No    Procedure Details:    With the patient in left lateral decubitus position the endoscope was inserted through the anorectal area into the rectum. The scope was then advanced through the length of the colon to the No. The ileocecal valve was visualized and cannulated. The quality of preparation was fair. Water was insufflated through the biopsy channel to clean out the colon and look at the underlying mucosa. The scope was carefully withdrawn and

## 2024-02-12 NOTE — ANESTHESIA POSTPROCEDURE EVALUATION
Department of Anesthesiology  Postprocedure Note    Patient: Estrella Whiting  MRN: [id number]  YOB: 1955  Date of evaluation: 2/12/2024    Procedure Summary       Date: 02/12/24 Room / Location: [address]    Anesthesia Start: 0803 Anesthesia Stop: 0902    Procedures:       EGD BIOPSY      COLONOSCOPY POLYPECTOMY SNARE/COLD BIOPSY      EGD DILATION BALLOON Diagnosis:       Hx of colonic polyps      (Hx of colonic polyps [Z86.010])    Surgeons: Chelsea Knowles MD Responsible Provider: Og Sagastume MD    Anesthesia Type: General, TIVA ASA Status: 3            Anesthesia Type: General, TIVA    Fidel Phase I: Fidel Score: 10    Fidel Phase II: Fidel Score: 10    Anesthesia Post Evaluation    Patient location during evaluation: PACU  Patient participation: complete - patient participated  Level of consciousness: awake and alert  Airway patency: patent  Nausea & Vomiting: no nausea and no vomiting  Cardiovascular status: blood pressure returned to baseline  Respiratory status: acceptable  Hydration status: euvolemic  Comments: VSS on transfer to phase 2 recovery.  No anesthetic complications.  Pain management: adequate    No notable events documented.

## 2024-02-12 NOTE — DISCHARGE INSTRUCTIONS
PATIENT INSTRUCTIONS  POST-SEDATION    Estrella Whiting          IMMEDIATELY FOLLOWING PROCEDURE:    Do not drive or operate machinery for the first twenty four hours after surgery.     Do not make any important decisions for twenty four hours after surgery or while taking narcotic pain medications or sedatives.     You should NOT BE LEFT UNATTENDED OR ALONE. A responsible adult should be with you for the rest of the day of your procedure and also during the night for your protection and safety.    You may experience some light headedness. Rest at home with activity as tolerated. You may not need to go to bed, but it is important to rest for the next 24 hours. You should not engage in athletic sports such as basketball, volleyball, jogging, skating, or activities requiring refined motor skills for 24 hours.   If you develop intractable nausea and vomiting or a severe headache please notify your doctor immediately.   You are not expected to have any fever, but if you feel warm, take your temperature. If you have a fever 101 degrees or higher, call your doctor.     If you have had an Endoscopy:   *Eat lightly for your first meal and gradually resume your normal / prescribed diet. DO NOT eat or drink until your gag reflex returns.   *If you have a sore throat you may use lozenges, or salt water gargles.   *If you have had a colonoscopy, do not expect a normal bowel movement for approximately three days due to the cleansing of the large intestine prior to colonoscopy.    ONCE YOU ARE HOME, IF YOU SHOULD HAVE:  Difficulty in breathing, persistent nausea or vomiting, bleeding you feel is excessive, or pain that is unusual, increased abdominal bloating, or any swelling, fever / chills, call your physician. If you cannot contact your physician, but feel that your signs and symptoms need a physician's attention, go to the Emergency Department.      FOLLOW-UP:    Please follow up with dayanna GLYNN as scheduled or

## 2024-02-12 NOTE — H&P
Gastroenterology Note             Pre-operative History and Physical    Patient: Estrella Whiting  : 1955  CSN:     History Obtained From:  patient and/or guardian.     HISTORY OF PRESENT ILLNESS:    The patient is a 68 y.o. female  here for EGD/colonoscopy.     Past Medical History:    Past Medical History:   Diagnosis Date    Colitis     Diverticulosis     MVP (mitral valve prolapse)     Spastic colon     Urinary incontinence      Past Surgical History:    Past Surgical History:   Procedure Laterality Date    APPENDECTOMY      BREAST ENHANCEMENT SURGERY  1973    x3- all breast tissue removed then implants    CARPAL TUNNEL RELEASE      COLONOSCOPY  2017    polyps    HYSTERECTOMY (CERVIX STATUS UNKNOWN)      INNER EAR SURGERY      JOINT REPLACEMENT Right     Knee    TONSILLECTOMY      UPPER GASTROINTESTINAL ENDOSCOPY  2017     Medications Prior to Admission:   No current facility-administered medications on file prior to encounter.     Current Outpatient Medications on File Prior to Encounter   Medication Sig Dispense Refill    metFORMIN (GLUCOPHAGE) 500 MG tablet Take 1 tablet by mouth daily (with breakfast) 90 tablet 1    DULoxetine (CYMBALTA) 60 MG extended release capsule Take 1 capsule by mouth once daily 90 capsule 1    atorvastatin (LIPITOR) 40 MG tablet Take 1 tablet by mouth once daily 90 tablet 0    albuterol sulfate HFA (PROVENTIL HFA) 108 (90 Base) MCG/ACT inhaler Inhale 2 puffs into the lungs every 4 hours as needed for Wheezing or Shortness of Breath 18 g 6    furosemide (LASIX) 40 MG tablet Take 1 tablet by mouth once daily 90 tablet 3    dilTIAZem (CARDIZEM CD) 360 MG extended release capsule Take 1 capsule by mouth daily 90 capsule 3    spironolactone (ALDACTONE) 25 MG tablet Take 1 tablet by mouth once daily 90 tablet 3    aspirin EC 81 MG EC tablet Take 1 tablet by mouth daily 90 tablet 1    promethazine (PHENERGAN) 12.5 MG tablet Take 1 tablet by mouth every 8 hours as

## 2024-02-12 NOTE — PROGRESS NOTES
Patient's IV removed and she is getting dressed at this time. Discharge instructions given to patient's sister Chelly at this time and she states understanding.

## 2024-02-12 NOTE — ANESTHESIA PRE PROCEDURE
Department of Anesthesiology  Preprocedure Note       Name:  Estrella Whiting   Age:  68 y.o.  :  1955                                          MRN:  [id number]         Date:  2024      Surgeon: Surgeon(s):  Chelsea Knowles MD    Procedure: Procedure(s):  EGD W/ANES. (8:15)  COLON W/ANES.    Medications prior to admission:   Prior to Admission medications    Medication Sig Start Date End Date Taking? Authorizing Provider   vitamin D (CHOLECALCIFEROL) 81560 UNIT CAPS Take 1 capsule by mouth once a week 24  Amilcar Logan MD   Cholecalciferol 50 MCG (2000 UT) TABS Take 1 tablet by mouth daily Take 1 tablet by mouth daily. Start this medication after you finish the weekly regimen 24   Amilcar Logan MD   metFORMIN (GLUCOPHAGE) 500 MG tablet Take 1 tablet by mouth daily (with breakfast) 24   Yi Weinstein PA   DULoxetine (CYMBALTA) 60 MG extended release capsule Take 1 capsule by mouth once daily 23   Yi Weinstein PA   atorvastatin (LIPITOR) 40 MG tablet Take 1 tablet by mouth once daily 23   Gilberto Prince MD   albuterol sulfate HFA (PROVENTIL HFA) 108 (90 Base) MCG/ACT inhaler Inhale 2 puffs into the lungs every 4 hours as needed for Wheezing or Shortness of Breath 10/27/23   Caesar Blood MD   furosemide (LASIX) 40 MG tablet Take 1 tablet by mouth once daily 23   Gilberto Prince MD   dilTIAZem (CARDIZEM CD) 360 MG extended release capsule Take 1 capsule by mouth daily 23   Gilberto Prince MD   spironolactone (ALDACTONE) 25 MG tablet Take 1 tablet by mouth once daily 23   Enzweiler, Diane M, APRN - CNP   aspirin EC 81 MG EC tablet Take 1 tablet by mouth daily 23   Adrian Brower DO   promethazine (PHENERGAN) 12.5 MG tablet Take 1 tablet by mouth every 8 hours as needed for Nausea 22   Stacie Horton APRN - CNP   Esomeprazole Magnesium (NEXIUM PO) Take 20 mg by mouth daily     Provider, MD Jasmyn       Current

## 2024-02-21 RX ORDER — ATORVASTATIN CALCIUM 40 MG/1
40 TABLET, FILM COATED ORAL DAILY
Qty: 90 TABLET | Refills: 5 | Status: SHIPPED | OUTPATIENT
Start: 2024-02-21

## 2024-02-21 NOTE — TELEPHONE ENCOUNTER
Last OV 7/26/23  No upcoming OV  LIPID 1/10/24  LFT 1/10/24    Plan:  Current medications reviewed.  No refills given as warranted.  Repeat blood work  -BMP, Lipids   -you will need to be fasting for blood work  -it is ok to take your medicine with sips of water or black coffee  No additional cardiac testing at this time.  I will personally review your tests and then I will have my staff call you with the results.   You have some small 20-30% blockages and I my increase your cholesterol medication  Increase you diltiazem to 360 mg daily when your current bottle of 240 mg is finished.        Follow up with me in 6 months, call in December to make your next appointment    Attempted to call pt, no answer. LMOVM for pt to return call back to office.

## 2024-03-04 ENCOUNTER — APPOINTMENT (OUTPATIENT)
Dept: GENERAL RADIOLOGY | Age: 69
End: 2024-03-04
Payer: MEDICARE

## 2024-03-04 ENCOUNTER — HOSPITAL ENCOUNTER (OUTPATIENT)
Age: 69
Setting detail: OBSERVATION
Discharge: HOME | End: 2024-03-05
Attending: EMERGENCY MEDICINE | Admitting: INTERNAL MEDICINE
Payer: MEDICARE

## 2024-03-04 DIAGNOSIS — R07.9 CHEST PAIN, UNSPECIFIED TYPE: ICD-10-CM

## 2024-03-04 DIAGNOSIS — I49.9 IRREGULAR HEART RHYTHM: ICD-10-CM

## 2024-03-04 DIAGNOSIS — R00.0 TACHYCARDIA: ICD-10-CM

## 2024-03-04 DIAGNOSIS — R00.2 PALPITATIONS: Primary | ICD-10-CM

## 2024-03-04 LAB
ALBUMIN SERPL-MCNC: 4.4 G/DL (ref 3.4–5)
ALBUMIN/GLOB SERPL: 1.6 {RATIO} (ref 1.1–2.2)
ALP SERPL-CCNC: 127 U/L (ref 40–129)
ALT SERPL-CCNC: 26 U/L (ref 10–40)
ANION GAP SERPL CALCULATED.3IONS-SCNC: 13 MMOL/L (ref 3–16)
AST SERPL-CCNC: 16 U/L (ref 15–37)
BASOPHILS # BLD: 0.1 K/UL (ref 0–0.2)
BASOPHILS NFR BLD: 0.8 %
BILIRUB SERPL-MCNC: <0.2 MG/DL (ref 0–1)
BUN SERPL-MCNC: 19 MG/DL (ref 7–20)
CALCIUM SERPL-MCNC: 9.4 MG/DL (ref 8.3–10.6)
CHLORIDE SERPL-SCNC: 97 MMOL/L (ref 99–110)
CO2 SERPL-SCNC: 27 MMOL/L (ref 21–32)
CREAT SERPL-MCNC: 0.8 MG/DL (ref 0.6–1.2)
DEPRECATED RDW RBC AUTO: 15.2 % (ref 12.4–15.4)
EKG ATRIAL RATE: 107 BPM
EKG ATRIAL RATE: 85 BPM
EKG DIAGNOSIS: NORMAL
EKG DIAGNOSIS: NORMAL
EKG P AXIS: 43 DEGREES
EKG P AXIS: 45 DEGREES
EKG P-R INTERVAL: 138 MS
EKG P-R INTERVAL: 156 MS
EKG Q-T INTERVAL: 326 MS
EKG Q-T INTERVAL: 378 MS
EKG QRS DURATION: 76 MS
EKG QRS DURATION: 78 MS
EKG QTC CALCULATION (BAZETT): 435 MS
EKG QTC CALCULATION (BAZETT): 449 MS
EKG R AXIS: 0 DEGREES
EKG R AXIS: 4 DEGREES
EKG T AXIS: 61 DEGREES
EKG T AXIS: 62 DEGREES
EKG VENTRICULAR RATE: 107 BPM
EKG VENTRICULAR RATE: 85 BPM
EOSINOPHIL # BLD: 0.2 K/UL (ref 0–0.6)
EOSINOPHIL NFR BLD: 1.8 %
FLUAV RNA RESP QL NAA+PROBE: NOT DETECTED
FLUBV RNA RESP QL NAA+PROBE: NOT DETECTED
GFR SERPLBLD CREATININE-BSD FMLA CKD-EPI: >60 ML/MIN/{1.73_M2}
GLUCOSE BLD-MCNC: 101 MG/DL (ref 70–99)
GLUCOSE SERPL-MCNC: 125 MG/DL (ref 70–99)
HCT VFR BLD AUTO: 40.2 % (ref 36–48)
HGB BLD-MCNC: 13.4 G/DL (ref 12–16)
LYMPHOCYTES # BLD: 2.2 K/UL (ref 1–5.1)
LYMPHOCYTES NFR BLD: 22.2 %
MAGNESIUM SERPL-MCNC: 1.7 MG/DL (ref 1.8–2.4)
MCH RBC QN AUTO: 27.6 PG (ref 26–34)
MCHC RBC AUTO-ENTMCNC: 33.2 G/DL (ref 31–36)
MCV RBC AUTO: 83.2 FL (ref 80–100)
MONOCYTES # BLD: 0.8 K/UL (ref 0–1.3)
MONOCYTES NFR BLD: 7.7 %
NEUTROPHILS # BLD: 6.6 K/UL (ref 1.7–7.7)
NEUTROPHILS NFR BLD: 67.5 %
NT-PROBNP SERPL-MCNC: <36 PG/ML (ref 0–124)
PERFORMED ON: ABNORMAL
PLATELET # BLD AUTO: 264 K/UL (ref 135–450)
PMV BLD AUTO: 8.2 FL (ref 5–10.5)
POTASSIUM SERPL-SCNC: 4 MMOL/L (ref 3.5–5.1)
PROT SERPL-MCNC: 7.1 G/DL (ref 6.4–8.2)
RBC # BLD AUTO: 4.83 M/UL (ref 4–5.2)
SARS-COV-2 RNA RESP QL NAA+PROBE: NOT DETECTED
SODIUM SERPL-SCNC: 137 MMOL/L (ref 136–145)
TROPONIN, HIGH SENSITIVITY: 11 NG/L (ref 0–14)
TROPONIN, HIGH SENSITIVITY: 9 NG/L (ref 0–14)
WBC # BLD AUTO: 9.7 K/UL (ref 4–11)

## 2024-03-04 PROCEDURE — 2580000003 HC RX 258: Performed by: INTERNAL MEDICINE

## 2024-03-04 PROCEDURE — 6360000002 HC RX W HCPCS: Performed by: INTERNAL MEDICINE

## 2024-03-04 PROCEDURE — 93005 ELECTROCARDIOGRAM TRACING: CPT | Performed by: REGISTERED NURSE

## 2024-03-04 PROCEDURE — G0378 HOSPITAL OBSERVATION PER HR: HCPCS

## 2024-03-04 PROCEDURE — 87636 SARSCOV2 & INF A&B AMP PRB: CPT

## 2024-03-04 PROCEDURE — 84484 ASSAY OF TROPONIN QUANT: CPT

## 2024-03-04 PROCEDURE — 96372 THER/PROPH/DIAG INJ SC/IM: CPT

## 2024-03-04 PROCEDURE — 80053 COMPREHEN METABOLIC PANEL: CPT

## 2024-03-04 PROCEDURE — 83880 ASSAY OF NATRIURETIC PEPTIDE: CPT

## 2024-03-04 PROCEDURE — 99285 EMERGENCY DEPT VISIT HI MDM: CPT

## 2024-03-04 PROCEDURE — 93005 ELECTROCARDIOGRAM TRACING: CPT | Performed by: EMERGENCY MEDICINE

## 2024-03-04 PROCEDURE — 6370000000 HC RX 637 (ALT 250 FOR IP): Performed by: INTERNAL MEDICINE

## 2024-03-04 PROCEDURE — 36415 COLL VENOUS BLD VENIPUNCTURE: CPT

## 2024-03-04 PROCEDURE — 93010 ELECTROCARDIOGRAM REPORT: CPT | Performed by: INTERNAL MEDICINE

## 2024-03-04 PROCEDURE — 85025 COMPLETE CBC W/AUTO DIFF WBC: CPT

## 2024-03-04 PROCEDURE — 71045 X-RAY EXAM CHEST 1 VIEW: CPT

## 2024-03-04 PROCEDURE — 83735 ASSAY OF MAGNESIUM: CPT

## 2024-03-04 RX ORDER — ACETAMINOPHEN 325 MG/1
650 TABLET ORAL EVERY 6 HOURS PRN
Status: DISCONTINUED | OUTPATIENT
Start: 2024-03-04 | End: 2024-03-05 | Stop reason: HOSPADM

## 2024-03-04 RX ORDER — DEXTROSE MONOHYDRATE 100 MG/ML
INJECTION, SOLUTION INTRAVENOUS CONTINUOUS PRN
Status: DISCONTINUED | OUTPATIENT
Start: 2024-03-04 | End: 2024-03-05 | Stop reason: HOSPADM

## 2024-03-04 RX ORDER — SODIUM CHLORIDE 0.9 % (FLUSH) 0.9 %
10 SYRINGE (ML) INJECTION EVERY 12 HOURS SCHEDULED
Status: DISCONTINUED | OUTPATIENT
Start: 2024-03-04 | End: 2024-03-05 | Stop reason: HOSPADM

## 2024-03-04 RX ORDER — ACETAMINOPHEN 650 MG/1
650 SUPPOSITORY RECTAL EVERY 6 HOURS PRN
Status: DISCONTINUED | OUTPATIENT
Start: 2024-03-04 | End: 2024-03-05 | Stop reason: HOSPADM

## 2024-03-04 RX ORDER — LANOLIN ALCOHOL/MO/W.PET/CERES
3 CREAM (GRAM) TOPICAL NIGHTLY
Status: DISCONTINUED | OUTPATIENT
Start: 2024-03-04 | End: 2024-03-05 | Stop reason: HOSPADM

## 2024-03-04 RX ORDER — POTASSIUM CHLORIDE 7.45 MG/ML
10 INJECTION INTRAVENOUS PRN
Status: DISCONTINUED | OUTPATIENT
Start: 2024-03-04 | End: 2024-03-05 | Stop reason: HOSPADM

## 2024-03-04 RX ORDER — GLUCAGON 1 MG/ML
1 KIT INJECTION PRN
Status: DISCONTINUED | OUTPATIENT
Start: 2024-03-04 | End: 2024-03-05 | Stop reason: HOSPADM

## 2024-03-04 RX ORDER — SODIUM CHLORIDE 9 MG/ML
INJECTION, SOLUTION INTRAVENOUS PRN
Status: DISCONTINUED | OUTPATIENT
Start: 2024-03-04 | End: 2024-03-05 | Stop reason: HOSPADM

## 2024-03-04 RX ORDER — SODIUM CHLORIDE 0.9 % (FLUSH) 0.9 %
10 SYRINGE (ML) INJECTION PRN
Status: DISCONTINUED | OUTPATIENT
Start: 2024-03-04 | End: 2024-03-05 | Stop reason: HOSPADM

## 2024-03-04 RX ORDER — MAGNESIUM SULFATE IN WATER 40 MG/ML
2000 INJECTION, SOLUTION INTRAVENOUS PRN
Status: DISCONTINUED | OUTPATIENT
Start: 2024-03-04 | End: 2024-03-05 | Stop reason: HOSPADM

## 2024-03-04 RX ORDER — ATORVASTATIN CALCIUM 40 MG/1
40 TABLET, FILM COATED ORAL DAILY
Status: DISCONTINUED | OUTPATIENT
Start: 2024-03-05 | End: 2024-03-05 | Stop reason: HOSPADM

## 2024-03-04 RX ORDER — DULOXETIN HYDROCHLORIDE 60 MG/1
60 CAPSULE, DELAYED RELEASE ORAL DAILY
Status: DISCONTINUED | OUTPATIENT
Start: 2024-03-05 | End: 2024-03-05 | Stop reason: HOSPADM

## 2024-03-04 RX ORDER — ONDANSETRON 2 MG/ML
4 INJECTION INTRAMUSCULAR; INTRAVENOUS EVERY 6 HOURS PRN
Status: DISCONTINUED | OUTPATIENT
Start: 2024-03-04 | End: 2024-03-05 | Stop reason: HOSPADM

## 2024-03-04 RX ORDER — PROMETHAZINE HYDROCHLORIDE 25 MG/1
12.5 TABLET ORAL EVERY 6 HOURS PRN
Status: DISCONTINUED | OUTPATIENT
Start: 2024-03-04 | End: 2024-03-05 | Stop reason: HOSPADM

## 2024-03-04 RX ORDER — POTASSIUM CHLORIDE 20 MEQ/1
40 TABLET, EXTENDED RELEASE ORAL PRN
Status: DISCONTINUED | OUTPATIENT
Start: 2024-03-04 | End: 2024-03-05 | Stop reason: HOSPADM

## 2024-03-04 RX ORDER — DILTIAZEM HYDROCHLORIDE 180 MG/1
360 CAPSULE, COATED, EXTENDED RELEASE ORAL DAILY
Status: DISCONTINUED | OUTPATIENT
Start: 2024-03-05 | End: 2024-03-05 | Stop reason: HOSPADM

## 2024-03-04 RX ORDER — PANTOPRAZOLE SODIUM 40 MG/1
40 TABLET, DELAYED RELEASE ORAL
Status: DISCONTINUED | OUTPATIENT
Start: 2024-03-05 | End: 2024-03-05 | Stop reason: HOSPADM

## 2024-03-04 RX ORDER — NICOTINE 21 MG/24HR
1 PATCH, TRANSDERMAL 24 HOURS TRANSDERMAL DAILY
Status: DISCONTINUED | OUTPATIENT
Start: 2024-03-05 | End: 2024-03-05 | Stop reason: HOSPADM

## 2024-03-04 RX ORDER — ENOXAPARIN SODIUM 100 MG/ML
30 INJECTION SUBCUTANEOUS 2 TIMES DAILY
Status: DISCONTINUED | OUTPATIENT
Start: 2024-03-04 | End: 2024-03-05 | Stop reason: HOSPADM

## 2024-03-04 RX ORDER — ASPIRIN 81 MG/1
81 TABLET ORAL DAILY
Status: DISCONTINUED | OUTPATIENT
Start: 2024-03-05 | End: 2024-03-05 | Stop reason: HOSPADM

## 2024-03-04 RX ORDER — ALBUTEROL SULFATE 90 UG/1
2 AEROSOL, METERED RESPIRATORY (INHALATION) EVERY 4 HOURS PRN
Status: DISCONTINUED | OUTPATIENT
Start: 2024-03-04 | End: 2024-03-05 | Stop reason: HOSPADM

## 2024-03-04 RX ORDER — FUROSEMIDE 40 MG/1
40 TABLET ORAL DAILY
Status: DISCONTINUED | OUTPATIENT
Start: 2024-03-05 | End: 2024-03-05 | Stop reason: HOSPADM

## 2024-03-04 RX ORDER — INSULIN LISPRO 100 [IU]/ML
0-4 INJECTION, SOLUTION INTRAVENOUS; SUBCUTANEOUS EVERY 4 HOURS
Status: DISCONTINUED | OUTPATIENT
Start: 2024-03-05 | End: 2024-03-05 | Stop reason: HOSPADM

## 2024-03-04 RX ORDER — SODIUM CHLORIDE 9 MG/ML
INJECTION, SOLUTION INTRAVENOUS CONTINUOUS
Status: DISCONTINUED | OUTPATIENT
Start: 2024-03-05 | End: 2024-03-04

## 2024-03-04 RX ORDER — SPIRONOLACTONE 25 MG/1
25 TABLET ORAL DAILY
Status: DISCONTINUED | OUTPATIENT
Start: 2024-03-05 | End: 2024-03-05 | Stop reason: HOSPADM

## 2024-03-04 RX ADMIN — Medication 10 ML: at 22:56

## 2024-03-04 RX ADMIN — Medication 3 MG: at 22:55

## 2024-03-04 RX ADMIN — ENOXAPARIN SODIUM 30 MG: 100 INJECTION SUBCUTANEOUS at 22:55

## 2024-03-04 ASSESSMENT — ENCOUNTER SYMPTOMS
EYES NEGATIVE: 1
SHORTNESS OF BREATH: 1
GASTROINTESTINAL NEGATIVE: 1

## 2024-03-04 ASSESSMENT — PAIN - FUNCTIONAL ASSESSMENT: PAIN_FUNCTIONAL_ASSESSMENT: NONE - DENIES PAIN

## 2024-03-04 ASSESSMENT — HEART SCORE: ECG: 0

## 2024-03-05 ENCOUNTER — TELEPHONE (OUTPATIENT)
Dept: CARDIOLOGY CLINIC | Age: 69
End: 2024-03-05

## 2024-03-05 VITALS
HEART RATE: 94 BPM | TEMPERATURE: 97.7 F | SYSTOLIC BLOOD PRESSURE: 148 MMHG | OXYGEN SATURATION: 95 % | DIASTOLIC BLOOD PRESSURE: 58 MMHG | RESPIRATION RATE: 16 BRPM | WEIGHT: 222.66 LBS | HEIGHT: 62 IN | BODY MASS INDEX: 40.98 KG/M2

## 2024-03-05 PROBLEM — I25.10 CORONARY ARTERY DISEASE DUE TO LIPID RICH PLAQUE: Status: ACTIVE | Noted: 2024-03-05

## 2024-03-05 PROBLEM — R00.2 PALPITATIONS: Status: ACTIVE | Noted: 2024-03-05

## 2024-03-05 PROBLEM — I25.83 CORONARY ARTERY DISEASE DUE TO LIPID RICH PLAQUE: Status: ACTIVE | Noted: 2024-03-05

## 2024-03-05 LAB
ALBUMIN SERPL-MCNC: 3.9 G/DL (ref 3.4–5)
ALBUMIN/GLOB SERPL: 1.4 {RATIO} (ref 1.1–2.2)
ALP SERPL-CCNC: 111 U/L (ref 40–129)
ALT SERPL-CCNC: 22 U/L (ref 10–40)
AMPHETAMINES UR QL SCN>1000 NG/ML: NORMAL
ANION GAP SERPL CALCULATED.3IONS-SCNC: 10 MMOL/L (ref 3–16)
AST SERPL-CCNC: 13 U/L (ref 15–37)
BARBITURATES UR QL SCN>200 NG/ML: NORMAL
BASOPHILS # BLD: 0 K/UL (ref 0–0.2)
BASOPHILS NFR BLD: 0.7 %
BENZODIAZ UR QL SCN>200 NG/ML: NORMAL
BILIRUB SERPL-MCNC: <0.2 MG/DL (ref 0–1)
BILIRUB UR QL STRIP.AUTO: NEGATIVE
BUN SERPL-MCNC: 17 MG/DL (ref 7–20)
CALCIUM SERPL-MCNC: 9.4 MG/DL (ref 8.3–10.6)
CANNABINOIDS UR QL SCN>50 NG/ML: NORMAL
CHLORIDE SERPL-SCNC: 101 MMOL/L (ref 99–110)
CLARITY UR: CLEAR
CO2 SERPL-SCNC: 26 MMOL/L (ref 21–32)
COCAINE UR QL SCN: NORMAL
COLOR UR: YELLOW
CREAT SERPL-MCNC: 0.6 MG/DL (ref 0.6–1.2)
D DIMER: <0.27 UG/ML FEU (ref 0–0.6)
DEPRECATED RDW RBC AUTO: 15.1 % (ref 12.4–15.4)
DRUG SCREEN COMMENT UR-IMP: NORMAL
EOSINOPHIL # BLD: 0.2 K/UL (ref 0–0.6)
EOSINOPHIL NFR BLD: 2.9 %
FENTANYL SCREEN, URINE: NORMAL
GFR SERPLBLD CREATININE-BSD FMLA CKD-EPI: >60 ML/MIN/{1.73_M2}
GLUCOSE BLD-MCNC: 110 MG/DL (ref 70–99)
GLUCOSE BLD-MCNC: 121 MG/DL (ref 70–99)
GLUCOSE BLD-MCNC: 127 MG/DL (ref 70–99)
GLUCOSE BLD-MCNC: 150 MG/DL (ref 70–99)
GLUCOSE SERPL-MCNC: 120 MG/DL (ref 70–99)
GLUCOSE UR STRIP.AUTO-MCNC: NEGATIVE MG/DL
HCT VFR BLD AUTO: 38 % (ref 36–48)
HGB BLD-MCNC: 12.6 G/DL (ref 12–16)
HGB UR QL STRIP.AUTO: NEGATIVE
KETONES UR STRIP.AUTO-MCNC: NEGATIVE MG/DL
LEUKOCYTE ESTERASE UR QL STRIP.AUTO: NEGATIVE
LYMPHOCYTES # BLD: 1.7 K/UL (ref 1–5.1)
LYMPHOCYTES NFR BLD: 26.6 %
MAGNESIUM SERPL-MCNC: 2.3 MG/DL (ref 1.8–2.4)
MCH RBC QN AUTO: 27.8 PG (ref 26–34)
MCHC RBC AUTO-ENTMCNC: 33.2 G/DL (ref 31–36)
MCV RBC AUTO: 83.9 FL (ref 80–100)
METHADONE UR QL SCN>300 NG/ML: NORMAL
MONOCYTES # BLD: 0.5 K/UL (ref 0–1.3)
MONOCYTES NFR BLD: 8.5 %
NEUTROPHILS # BLD: 3.8 K/UL (ref 1.7–7.7)
NEUTROPHILS NFR BLD: 61.3 %
NITRITE UR QL STRIP.AUTO: NEGATIVE
OPIATES UR QL SCN>300 NG/ML: NORMAL
OXYCODONE UR QL SCN: NORMAL
PCP UR QL SCN>25 NG/ML: NORMAL
PERFORMED ON: ABNORMAL
PH UR STRIP.AUTO: 6 [PH] (ref 5–8)
PH UR STRIP: 6 [PH]
PLATELET # BLD AUTO: 222 K/UL (ref 135–450)
PMV BLD AUTO: 8.1 FL (ref 5–10.5)
POTASSIUM SERPL-SCNC: 4 MMOL/L (ref 3.5–5.1)
PROT SERPL-MCNC: 6.6 G/DL (ref 6.4–8.2)
PROT UR STRIP.AUTO-MCNC: NEGATIVE MG/DL
RBC # BLD AUTO: 4.53 M/UL (ref 4–5.2)
SODIUM SERPL-SCNC: 137 MMOL/L (ref 136–145)
SP GR UR STRIP.AUTO: 1.02 (ref 1–1.03)
TROPONIN, HIGH SENSITIVITY: 12 NG/L (ref 0–14)
TSH SERPL DL<=0.005 MIU/L-ACNC: 1.49 UIU/ML (ref 0.27–4.2)
UA COMPLETE W REFLEX CULTURE PNL UR: NORMAL
UA DIPSTICK W REFLEX MICRO PNL UR: NORMAL
URN SPEC COLLECT METH UR: NORMAL
UROBILINOGEN UR STRIP-ACNC: 0.2 E.U./DL
WBC # BLD AUTO: 6.2 K/UL (ref 4–11)

## 2024-03-05 PROCEDURE — 96372 THER/PROPH/DIAG INJ SC/IM: CPT

## 2024-03-05 PROCEDURE — 97165 OT EVAL LOW COMPLEX 30 MIN: CPT

## 2024-03-05 PROCEDURE — G0378 HOSPITAL OBSERVATION PER HR: HCPCS

## 2024-03-05 PROCEDURE — 85379 FIBRIN DEGRADATION QUANT: CPT

## 2024-03-05 PROCEDURE — 83036 HEMOGLOBIN GLYCOSYLATED A1C: CPT

## 2024-03-05 PROCEDURE — 97530 THERAPEUTIC ACTIVITIES: CPT

## 2024-03-05 PROCEDURE — 84443 ASSAY THYROID STIM HORMONE: CPT

## 2024-03-05 PROCEDURE — 6370000000 HC RX 637 (ALT 250 FOR IP): Performed by: INTERNAL MEDICINE

## 2024-03-05 PROCEDURE — 6360000002 HC RX W HCPCS: Performed by: INTERNAL MEDICINE

## 2024-03-05 PROCEDURE — 99238 HOSP IP/OBS DSCHRG MGMT 30/<: CPT | Performed by: INTERNAL MEDICINE

## 2024-03-05 PROCEDURE — 81003 URINALYSIS AUTO W/O SCOPE: CPT

## 2024-03-05 PROCEDURE — 84484 ASSAY OF TROPONIN QUANT: CPT

## 2024-03-05 PROCEDURE — 93306 TTE W/DOPPLER COMPLETE: CPT

## 2024-03-05 PROCEDURE — 85025 COMPLETE CBC W/AUTO DIFF WBC: CPT

## 2024-03-05 PROCEDURE — 80053 COMPREHEN METABOLIC PANEL: CPT

## 2024-03-05 PROCEDURE — 99222 1ST HOSP IP/OBS MODERATE 55: CPT | Performed by: INTERNAL MEDICINE

## 2024-03-05 PROCEDURE — 93228 REMOTE 30 DAY ECG REV/REPORT: CPT | Performed by: INTERNAL MEDICINE

## 2024-03-05 PROCEDURE — 2580000003 HC RX 258: Performed by: INTERNAL MEDICINE

## 2024-03-05 PROCEDURE — 36415 COLL VENOUS BLD VENIPUNCTURE: CPT

## 2024-03-05 PROCEDURE — 80307 DRUG TEST PRSMV CHEM ANLYZR: CPT

## 2024-03-05 PROCEDURE — 83735 ASSAY OF MAGNESIUM: CPT

## 2024-03-05 RX ORDER — LANOLIN ALCOHOL/MO/W.PET/CERES
400 CREAM (GRAM) TOPICAL DAILY
Qty: 30 TABLET | Refills: 0 | Status: SHIPPED | OUTPATIENT
Start: 2024-03-06 | End: 2024-04-05

## 2024-03-05 RX ORDER — LANOLIN ALCOHOL/MO/W.PET/CERES
400 CREAM (GRAM) TOPICAL DAILY
Status: DISCONTINUED | OUTPATIENT
Start: 2024-03-05 | End: 2024-03-05 | Stop reason: HOSPADM

## 2024-03-05 RX ORDER — LANOLIN ALCOHOL/MO/W.PET/CERES
400 CREAM (GRAM) TOPICAL DAILY
Qty: 30 TABLET | Refills: 0 | Status: SHIPPED | OUTPATIENT
Start: 2024-03-06 | End: 2024-03-05

## 2024-03-05 RX ADMIN — Medication 400 MG: at 11:13

## 2024-03-05 RX ADMIN — FUROSEMIDE 40 MG: 40 TABLET ORAL at 11:06

## 2024-03-05 RX ADMIN — ASPIRIN 81 MG: 81 TABLET, COATED ORAL at 11:04

## 2024-03-05 RX ADMIN — ENOXAPARIN SODIUM 30 MG: 100 INJECTION SUBCUTANEOUS at 11:09

## 2024-03-05 RX ADMIN — DULOXETINE HYDROCHLORIDE 60 MG: 60 CAPSULE, DELAYED RELEASE ORAL at 11:05

## 2024-03-05 RX ADMIN — SPIRONOLACTONE 25 MG: 25 TABLET ORAL at 11:05

## 2024-03-05 RX ADMIN — Medication 10 ML: at 11:08

## 2024-03-05 RX ADMIN — DESMOPRESSIN ACETATE 40 MG: 0.2 TABLET ORAL at 11:06

## 2024-03-05 RX ADMIN — PANTOPRAZOLE SODIUM 40 MG: 40 TABLET, DELAYED RELEASE ORAL at 06:24

## 2024-03-05 RX ADMIN — DILTIAZEM HYDROCHLORIDE 360 MG: 180 CAPSULE, COATED, EXTENDED RELEASE ORAL at 11:04

## 2024-03-05 ASSESSMENT — PAIN SCALES - GENERAL: PAINLEVEL_OUTOF10: 0

## 2024-03-05 NOTE — FLOWSHEET NOTE
03/05/24 0748   Vital Signs   Temp 97.7 °F (36.5 °C)   Temp Source Oral   Pulse 94   Heart Rate Source Monitor   Respirations 16   BP (!) 148/58   MAP (Calculated) 88   BP Location Right upper arm   BP Method Automatic   Patient Position Semi fowlers   Pain Assessment   Pain Level 0   Oxygen Therapy   SpO2 95 %   O2 Device None (Room air)     AM assessment completed, see flow sheet. Pt is alert and oriented. Vital signs are as noted. ECHO completed. Respirations are even & easy. No complaints voiced. Pt denies needs at this time. SR up x 2, and bed in low position. Call light is within reach.

## 2024-03-05 NOTE — CARE COORDINATION
Review of chart for any potential discharge needs.   No needs identified for discharge intervention at this time.   MD and bedside RN  if needs arise please consult case management for discharge intervention.  CM will follow from periphery.      Readmit score N/A    Reviewed chart and met with pt. Lives home alone and is fully IPTA. Has rolliator but no other DME.   Lives home alone in 1st floor apt. Family can assist if needed. Noted pt to dc today and no dc needs identified.

## 2024-03-05 NOTE — PLAN OF CARE
Problem: Discharge Planning  Goal: Discharge to home or other facility with appropriate resources  Outcome: Progressing  Flowsheets (Taken 3/4/2024 2204)  Discharge to home or other facility with appropriate resources: Identify barriers to discharge with patient and caregiver     Problem: Safety - Adult  Goal: Free from fall injury  Outcome: Progressing     Problem: Cardiovascular - Adult  Goal: Maintains optimal cardiac output and hemodynamic stability  Outcome: Progressing     Problem: Skin/Tissue Integrity - Adult  Goal: Skin integrity remains intact  Outcome: Progressing

## 2024-03-05 NOTE — ED PROVIDER NOTES
Emergency Department Attending SUPERVISORY Provider Note  Location: [facility] ED  3/4/2024     Chief Complaint   Patient presents with    Chest Pain     Started last night feels like heart is racing, denies pain but states it feels uncomfortable and makes her dizzy and lightheaded.        PATIENT ID:  Estrella Whiting is a 68 y.o. female    Past Medical History:   Diagnosis Date    CHF (congestive heart failure) (HCC)     Colitis     Diverticulosis     MVP (mitral valve prolapse)     PONV (postoperative nausea and vomiting)     Prolonged emergence from general anesthesia     Spastic colon     Urinary incontinence        Estrella Whiting was evaluated in the Emergency Department for concerns of lightheadedness, dizziness, and feeling like her heart is racing.  Says that.  She has been taking her heart medications as prescribed, looks like she is on diltiazem, but still feels this way.  Denies any other concerns including nausea or vomiting.  No lightheadedness or dizziness        Vitals:    03/04/24 1828   BP: (!) 152/136   Pulse: (!) 104   Resp:    Temp:    SpO2: 94%        BASIC EXAM:  Upper physical exam, patient is in no acute distress.  Really tachycardic to about 100.  Abdomen soft nontender.  Lungs are clear to auscultation bilaterally.    I independently interpreted the EKG:   EKG Interpretation:    EKG obtained today in the emergency department shows sinus tachycardia with a ventricular to 107.  Q waves inferiorly, but no ST segment elevation, same depression, hyperacute T waves.  QTc 435 GRICELDA L          I independently interpreted the Xray: Unremarkable; no obvious infiltrates, no obvious deformities, cardiac silhouette appears normal.      XR CHEST PORTABLE   Final Result   No acute cardiopulmonary process identified.             Labs Reviewed   COMPREHENSIVE METABOLIC PANEL - Abnormal; Notable for the following components:       Result Value    Chloride 97 (*)     Glucose 125 (*)     All 
is much the same with  generally reassuring labs showing negative flu and COVID, magnesium of 1.7, troponin series that is largely unremarkable, BNP of less than 36, CMP showing hyperglycemia at 125 but otherwise unremarkable, and an unremarkable CBC.  Her EKG is also unremarkable as noted and her chest x-ray shows no acute infectious process.  I did reach out to cardiology was recommended admission at this time based on these findings.  I did reach out to the hospitalist service who has accepted this patient into their service at this time for cardiac workup and monitoring.  The patient is stable at time of admission.    Disposition Considerations (tests considered but not done, Admit vs D/C, Shared Decision Making, Pt Expectation of Test or Tx.): Patient will be admitted to the hospitalist service for palpitations as recommended by cardiology.  Patient has been stable for the duration of this ED visit and is stable upon admission.      The patient tolerated their visit well.  The patient and / or the family were informed of the results of any tests, a time was given to answer questions, a plan was proposed and they agreed with plan.    I am the Primary Clinician of Record.  FINAL IMPRESSION      1. Palpitations    2. Irregular heart rhythm    3. Chest pain, unspecified type          DISPOSITION/PLAN     DISPOSITION Admitted 03/04/2024 07:57:30 PM      PATIENT REFERRED TO:  No follow-up provider specified.    DISCHARGE MEDICATIONS:  Current Discharge Medication List          DISCONTINUED MEDICATIONS:  Current Discharge Medication List                 (Please note that portions of this note were completed with a voice recognition program.  Efforts were made to edit the dictations but occasionally words are mis-transcribed.)    ERVIN Marino CNP (electronically signed)       Sudarshan Velásquez APRN - CNP  03/04/24 7804

## 2024-03-05 NOTE — PROGRESS NOTES
4 Eyes Skin Assessment     NAME:  Estrella Whiting  YOB: 1955  MEDICAL RECORD NUMBER:  [id number]    The patient is being assessed for  Admission    I agree that at least one RN has performed a thorough Head to Toe Skin Assessment on the patient. ALL assessment sites listed below have been assessed.      Areas assessed by both nurses:    Head, Face, Ears, Shoulders, Back, Chest, Arms, Elbows, Hands, Sacrum. Buttock, Coccyx, Ischium, and Legs. Feet and Heels    Scattered scabs.        Does the Patient have a Wound? No noted wound(s)       Wayne Prevention initiated by RN: No  Wound Care Orders initiated by RN: No    Pressure Injury (Stage 3,4, Unstageable, DTI, NWPT, and Complex wounds) if present, place Wound referral order by RN under : No    New Ostomies, if present place, Ostomy referral order under : No     Nurse 1 eSignature: Electronically signed by Lou Green RN on 3/4/24 at 11:56 PM EST    **SHARE this note so that the co-signing nurse can place an eSignature**    Nurse 2 eSignature: {Esignature:[id number]}   
Inpatient Occupational Therapy Evaluation and Treatment and Discharge    Unit: [facility]  Date:  3/5/2024  Patient Name:    Estrella Whiting  Admitting diagnosis:  Chest pain [R07.9]  Admit Date:  3/4/2024  Precautions/Restrictions/WB Status/ Lines/ Wounds/ Oxygen: Fall risk, Telemetry, and Continuous pulse oximetry    Treatment Time:  11:28 - 12:02  Treatment Number:  1  Timed Code Treatment Minutes: 24 minutes  Total Treatment Minutes: 34  minutes    Patient Goals for Therapy: \"to go home \"          Discharge Recommendations: Home with PRN assist  DME needs for discharge: Needs Met       Therapy recommendations for staff:   Supervision for ambulation with use of No AD to/from chair  to/from bathroom  within room    History of Present Illness: Per VENANCIO Johnson MD  \"Patient is a 68 y.o. female who presented to [facility] with past medical history of MVP, CHF presented ED with chief complaint of heart palpitations began last night and has been progressively intermittently occurring but then would resolve.  Patient reports that he has never had arrhythmias before but wanted to come here due to having association with shortness of breath and chest pain has been going on even while being at rest.  Patient denies having any abdominal dysuria abdominal pain fever chills nausea vomiting.\"    Home Health S4 Level Recommendation:  NA    AM-PAC Score: AM-PAC Inpatient Daily Activity Raw Score: 24     Subjective:  Patient lying reclined in bed with no family present.   Pt agreeable to this OT session.     Cognition:    A&O x4   Able to follow 2 step commands    Pain:   No  Location: n/a  Rating: NA /10  Pain Medicine Status: No request made    Preadmission Environment:   Pt. Lives     Alone  Home environment:    apartment   Steps to enter first floor:   No steps  Steps to second floor/basement: N/A  Laundry:     Within building, takes elevator to get to laundry room  Bathroom:     walk in shower, hand held shower head, grab bars 
Patient admitted to room 209 from ED. Patient is alert and oriented x4 with telemetry on. Bed is locked and in lowest position. Side rails up x2. Call light placed in patient reach. Patient informed of the hospital routine, including but not limited to lab work, vital signs, hourly rounding, etc. Care plans and education updated, CHG wipes done at time of admission.     BP (!) 148/76   Pulse 87   Temp 98 °F (36.7 °C) (Oral)   Resp 17   Ht 1.575 m (5' 2\")   Wt 101.5 kg (223 lb 12.8 oz)   SpO2 96%   BMI 40.93 kg/m²     Most recent set of vitals as shown.     Patient has no LDA that  require CHG wipes, including possible a surgery incision, waggoner catheter, or a central line.       Bedside Mobility Assessment Tool (BMAT):     Assessment Level 1- Sit and Shake    1. From a semi-reclined position, ask patient to sit up and rotate to a seated position at the side of the bed. Can use the bedrail.    2. Ask patient to reach out and grab your hand and shake making sure patient reaches across his/her midline.   Pass- Patient is able to come to a seated position, maintain core strength. Maintains seated balance while reaching across midline. Move on to Assessment Level 2.     Assessment Level 2- Stretch and Point   1. With patient in seated position at the side of the bed, have patient place both feet on the floor (or stool) with knees no higher than hips.    2. Ask patient to stretch one leg and straighten the knee, then bend the ankle/flex and point the toes. If appropriate, repeat with the other leg.   Pass- Patient is able to demonstrate appropriate quad strength on intended weight bearing limb(s). Move onto Assessment Level 3.     Assessment Level 3- Stand   1. Ask patient to elevate off the bed or chair (seated to standing) using an assistive device (cane, bedrail).    2. Patient should be able to raise buttocks off be and hold for a count of five. May repeat once.   Pass- Patient maintains standing stability for 
Patient resting in bed with eyes closed, respiration easy, even, unlabored. No s/s of distress noted. No complains of pain or discomfort at this time. Call light within reach.    
Physical Therapy    PT order from Dr. Johnson noted. Per evaluating OT patient is mobilizing well and has no acute deficits.  Will discontinue order. Please re-order if needs arise.     Josefina Bull, PT, DPT      
Pt given written and verbal discharge instructions. Pt indicated understanding of home medication and care instructions. Prescription called in to pt preferred pharmacy (can also get OTC, pt aware). Pt packed own belongings. Pt declined wheelchair, ambulating safely with family.     
RT Inhaler-Nebulizer Bronchodilator Protocol Note    There is a bronchodilator order in the chart from a provider indicating to follow the RT Bronchodilator Protocol and there is an “Initiate RT Inhaler-Nebulizer Bronchodilator Protocol” order as well (see protocol at bottom of note).    CXR Findings:  XR CHEST PORTABLE    Result Date: 3/4/2024  No acute cardiopulmonary process identified.       The findings from the last RT Protocol Assessment were as follows:   History Pulmonary Disease: (P) Smoker 15 pack years or more  Respiratory Pattern: (P) Regular pattern and RR 12-20 bpm  Breath Sounds: (P) Clear breath sounds  Cough: (P) Strong, spontaneous, non-productive  Indication for Bronchodilator Therapy: (P) On home bronchodilators  Bronchodilator Assessment Score: (P) 1    Aerosolized bronchodilator medication orders have been revised according to the RT Inhaler-Nebulizer Bronchodilator Protocol below.    Respiratory Therapist to perform RT Therapy Protocol Assessment initially then follow the protocol.  Repeat RT Therapy Protocol Assessment PRN for score 0-3 or on second treatment, BID, and PRN for scores above 3.    No Indications - adjust the frequency to every 6 hours PRN wheezing or bronchospasm, if no treatments needed after 48 hours then discontinue using Per Protocol order mode.     If indication present, adjust the RT bronchodilator orders based on the Bronchodilator Assessment Score as indicated below.  Use Inhaler orders unless patient has one or more of the following: on home nebulizer, not able to hold breath for 10 seconds, is not alert and oriented, cannot activate and use MDI correctly, or respiratory rate 25 breaths per minute or more, then use the equivalent nebulizer order(s) with same Frequency and PRN reasons based on the score.  If a patient is on this medication at home then do not decrease Frequency below that used at home.    0-3 - enter or revise RT bronchodilator order(s) to equivalent 
tricuspid valve regurgitation.   Inadequate tricuspid regurgitation to estimate systolic pulmonary artery   pressure.   Elevated heart rate throughout study.    Assessment/Plan:  #Palpitations   #Chest pain  #Tachycardia    -trop negative x 3   -EKG with PACs  -CXR negative   -TSH pending  -d dimer pending   -cardiology evaluated, echo pending and plan for cardiac event monitor at dc     #Hypomagnesemia   -replace    #CAD, non-obstructive   -ASA, statin     #Chronic diastolic HF   -last echo as above, echo pending  -on lasix and aldactone, continue   -daily weights, intake and output     #HTN   -on cardizem     #DM type 2   -SSI   -monitor BG     #HLD   -statin     #Anxiety   #Depression   -cymbalta     #Essential tremor     #Morbid obesity   -BMI 40.73  -recommend weight loss and dietary changes     DVT Prophylaxis: Lovenox   Diet: ADULT DIET; Regular  Code Status: Full Code    RENARD Gómez  03/05/24  9:44 AM    [place] home   F/u [facility] nikita Paige M.D.

## 2024-03-05 NOTE — H&P
Hospital Medicine History & Physical      PCP: Yi Weinstein PA    Date of Admission: 3/4/2024    Date of Service: Pt seen/examined on 3/4/2024    Pt seen/examined face to face on and admitted as inpatient with expected LOS to be two days but can change depending on diagnostic work up and treatment response.     Chief Complaint:    Chief Complaint   Patient presents with    Chest Pain     Started last night feels like heart is racing, denies pain but states it feels uncomfortable and makes her dizzy and lightheaded.            ASSESSMENT AND PLAN:    Active Hospital Problems    Diagnosis Date Noted    Chest pain [R07.9] 03/04/2024     Palpitations and chest pain:  Continue telemetry  Troponin negative x 2  Patient had catheterization in 02/2023 that showed nonobstructive CAD patient had cath  Stress test ordered  Cardiology consulted, appreciated    HFpEF: EF 55%  Nonacute exacerbation  Continue home medication    Hyperlipidemia: Controlled on home Statin. Outpatient PCP follow up post-discharge  Anxiety: Controlled, continue home medications   Essential Hypertension: Reviewed patient's medications and plan is to continue home medication  GERD: Continue home medication  Class III obesity: Complicating assessment and treatment. Placing patient at risk for multiple co-morbidities as well as early death and contributing to the patient's presentation. Education, and counseling    .Due to the above diagnosis makes the patient higher risk for morbidity and mortality requiring testing and treatment      Discussion with the primary ER physician in regards to symptoms, history, physical exam, diagnosis and treatment, collaborative decision was to admit the patient.    Diet: Regular diet with n.p.o. past midnight    DVT Prophylaxis: lovenox    Dispo:   Expected LOS of two days      History Of Present Illness:      68 y.o. female who presented to [facility] with past medical history of MVP, CHF presented ED with

## 2024-03-05 NOTE — PLAN OF CARE
Problem: Discharge Planning  Goal: Discharge to home or other facility with appropriate resources  3/5/2024 1351 by Jen Martins RN  Outcome: Adequate for Discharge  Flowsheets (Taken 3/5/2024 0040 by Lou Green RN)  Discharge to home or other facility with appropriate resources: Identify barriers to discharge with patient and caregiver  3/4/2024 2358 by Lou Green RN  Outcome: Progressing  Flowsheets (Taken 3/4/2024 2204)  Discharge to home or other facility with appropriate resources: Identify barriers to discharge with patient and caregiver     Problem: Safety - Adult  Goal: Free from fall injury  3/5/2024 1351 by Jen Martins RN  Outcome: Adequate for Discharge  3/4/2024 2358 by Lou Green RN  Outcome: Progressing     Problem: Cardiovascular - Adult  Goal: Maintains optimal cardiac output and hemodynamic stability  3/5/2024 1351 by Jen Martins RN  Outcome: Adequate for Discharge  3/4/2024 2358 by Lou Green RN  Outcome: Progressing     Problem: Skin/Tissue Integrity - Adult  Goal: Skin integrity remains intact  3/5/2024 1351 by Jen Martins RN  Outcome: Adequate for Discharge  3/4/2024 2358 by Lou Green RN  Outcome: Progressing     Problem: Chronic Conditions and Co-morbidities  Goal: Patient's chronic conditions and co-morbidity symptoms are monitored and maintained or improved  Outcome: Adequate for Discharge

## 2024-03-05 NOTE — PLAN OF CARE
HEART FAILURE CARE PLAN:    Comorbidities Reviewed: Yes   Patient has a past medical history of CHF (congestive heart failure) (HCC), Colitis, Diverticulosis, MVP (mitral valve prolapse), PONV (postoperative nausea and vomiting), Prolonged emergence from general anesthesia, Spastic colon, and Urinary incontinence.     Weights Reviewed: Yes   Admission weight: 101.2 kg (223 lb)   Wt Readings from Last 3 Encounters:   03/04/24 101.5 kg (223 lb 12.8 oz)   02/05/24 99.8 kg (220 lb)   01/25/24 101.6 kg (224 lb)     Intake & Output Reviewed: Yes     Intake/Output Summary (Last 24 hours) at 3/5/2024 0145  Last data filed at 3/4/2024 2204  Gross per 24 hour   Intake 180 ml   Output --   Net 180 ml       ECHOCARDIOGRAM Reviewed: Yes   Patient's Ejection Fraction (EF) is greater than 40%     Medications Reviewed: Yes   SCHEDULED HOSPITAL MEDICATIONS:   sodium chloride flush  10 mL IntraVENous 2 times per day    enoxaparin  30 mg SubCUTAneous BID    melatonin  3 mg Oral Nightly    nicotine  1 patch TransDERmal Daily    aspirin EC  81 mg Oral Daily    atorvastatin  40 mg Oral Daily    dilTIAZem  360 mg Oral Daily    DULoxetine  60 mg Oral Daily    pantoprazole  40 mg Oral QAM AC    spironolactone  25 mg Oral Daily    insulin lispro  0-4 Units SubCUTAneous Q4H    furosemide  40 mg Oral Daily     HOME MEDICATIONS:  Prior to Admission medications    Medication Sig Start Date End Date Taking? Authorizing Provider   atorvastatin (LIPITOR) 40 MG tablet Take 1 tablet by mouth daily 2/21/24   Gilberto Prince MD   vitamin D (CHOLECALCIFEROL) 72417 UNIT CAPS Take 1 capsule by mouth once a week 1/25/24 4/24/24  Amilcar Logan MD   Cholecalciferol 50 MCG (2000 UT) TABS Take 1 tablet by mouth daily Take 1 tablet by mouth daily. Start this medication after you finish the weekly regimen 1/25/24   Amilcar Logan MD   metFORMIN (GLUCOPHAGE) 500 MG tablet Take 1 tablet by mouth daily (with breakfast) 1/8/24   Yi Weinstein, PA

## 2024-03-05 NOTE — TELEPHONE ENCOUNTER
Monitor placed by CHANA Dunlap, LEILA  Monitor company [employer]  Length of monitor 30 days  Monitor ordered by Tiarra  Patch ID [id number]  Phone ID [phone]  Activation successful prior to pt leaving office? Yes

## 2024-03-05 NOTE — CONSULTS
[facility] [place]   CONSULTATION  [phone]        Reason for Consultation/Chief Complaint: \"I have been asked to see her for Sinus tachycardia palpitations and PAC high CAD risk.\"    History of Present Illness:  Estrella Whiting is a 68 y.o. patient who presented to the hospital with above complaints.  Last two days she is having palpitations as if heart is jumping out of chest  may last several hrs. She will get lightheaded but no chest pain dyspnea or syncope.    H/P mitral valve prolapse,CHF, Non obst CAD dx 2/23, HLD on atorvastatin,DM2 34 pack yr former smoker quit two yrs ago.  She normally follows with Dr Prince my associate last seen on 7.26.23 for    48 hour monitor 5/20-5/23/22 Predominately NSR, PAC's 0.02%, PVC 0.01%. ECHO 6/15/22 LVEF>65%. mild cLVH. Grade I DD. Mild MAC; Trace to mild MR. Trace TR. Elevated heart rate throughout study.  Dr. Blood ordered PFT 9/21/2022 noted borderline mild restrictive lung defect and 6 MWT which was normal without hypoxia walking. Chest CT 10/18/22 negative for ILD; steatosis; nonsp 3.5cm liver mass. F/U CT imaging 11/22 demonstrated mass likely represented area of focal fat. No concerning issues on PFT's or CT imaging to explain SOB. 6MWT normal also.   R/LHC 2/28/2023 showed mild NOCAD (20-30% multiple branches, mildly elevated right and left filling pressure (LVEDP 17, RA=13).  EKG 2/28/23 NSR;poor R wave progression 98 bpm.  OV with Polo iPnto NP  3/15/23 started spironolactone 25 mg daily and lasix temporarily increased to BID for few days.   Today, she reports still having baseline VILLELA. There has no change in her breathing with adding spirinolactone or doubling lasix (just made her thirsty). She thinks her SOB could be related to her weight and she is trying to work on it.  She walks her dog 4 times a day and is walking around 10,000 steps a day.  She is able to do all of her daily activities.  Patient denies current edema, chest pain, palpitations,

## 2024-03-05 NOTE — ED NOTES
1915 Call placed to Cardiology had to call them back   1917 Call placed to Cardiology   1931 Consult completed with call back from Abilio Zamudio CNP speaking with Sudarshan Velásquez CNP

## 2024-03-05 NOTE — DISCHARGE SUMMARY
assay. This test is a multiplex Real-Time Reverse  Transcriptase Polymerase Chain Reaction (RT-PCR)-based in vitro  diagnostic test intended for the qualitative detection of nucleic  acids from SARS-CoV-2, influenza A, and influenza B in nasopharyngeal  and nasal swab specimens for use under the FDA’s Emergency Use  Authorization (EUA) only.    Patient Fact Sheet:  https://www.fda.gov/media/467467/download  Provider Fact Sheet: https://www.fda.gov/media/750436/download  EUA: https://www.fda.gov/media/226215/download  IFU: https://www.fda.gov/media/920956/download    Methodology:  RT-PCR          INFLUENZA A NOT DETECTED     INFLUENZA B NOT DETECTED              RADIOLOGY  XR CHEST PORTABLE   Final Result   No acute cardiopulmonary process identified.           Echocardiogram from 3/5/24  Summary   Technically difficult examination secondary to habitus. Apical views are off   axis.   Left ventricular systolic function is normal with ejection fraction   estimated at 55%.   No regional wall motion abnormalities.   There is mild concentric left ventricular hypertrophy.   Grade I diastolic dysfunction with normal left ventricular filling pressure.   No significant valvular heart disease identified.   Systolic pulmonary artery pressure (SPAP) is normal estimated at 30 mmHg   (Right atrial pressure of 3 mmHg).   No significant change from exam done 6/15/2022.    Discharge Medications     Medication List        CONTINUE taking these medications      albuterol sulfate  (90 Base) MCG/ACT inhaler  Commonly known as: Proventil HFA  Inhale 2 puffs into the lungs every 4 hours as needed for Wheezing or Shortness of Breath     aspirin EC 81 MG EC tablet  Take 1 tablet by mouth daily     atorvastatin 40 MG tablet  Commonly known as: LIPITOR  Take 1 tablet by mouth daily     dilTIAZem 360 MG extended release capsule  Commonly known as: CARDIZEM CD  Take 1 capsule by mouth daily     DULoxetine 60 MG extended release

## 2024-03-05 NOTE — DISCHARGE INSTRUCTIONS
being expended.    Elements of Energy Conservation:   Prioritize/Plan: Decide what needs to be done today, and what can wait for a later date, write to do lists, plan ahead to avoid extra trips, and gather supplies and equipment needed before starting an activity.   Position: Avoid tiring and awkward posture that may impair breathing.  Pace: Slow and steady pace, never rushing!  Pursed lip breathing.  Pursed Lip Breathing: \"Smell the roses, blow out the candles\".

## 2024-03-06 ENCOUNTER — TELEPHONE (OUTPATIENT)
Dept: FAMILY MEDICINE CLINIC | Age: 69
End: 2024-03-06

## 2024-03-06 ENCOUNTER — CARE COORDINATION (OUTPATIENT)
Dept: CASE MANAGEMENT | Age: 69
End: 2024-03-06

## 2024-03-06 LAB
EST. AVERAGE GLUCOSE BLD GHB EST-MCNC: 139.9 MG/DL
HBA1C MFR BLD: 6.5 %

## 2024-03-06 NOTE — TELEPHONE ENCOUNTER
Care Transitions Initial Follow Up Call    Outreach made within 2 business days of discharge: Yes    Patient: Estrella Whiting Patient : 1955   MRN: [id number]  Reason for Admission: There are no discharge diagnoses documented for the most recent discharge.  Discharge Date: 3/5/24       Spoke with: patient to be called by CTN    Discharge department/facility: [facility] Interactive Patient Contact:  Was patient able to fill all prescriptions: Yes  Was patient instructed to bring all medications to the follow-up visit: Yes  Is patient taking all medications as directed in the discharge summary? Yes  Does patient understand their discharge instructions: Yes  Does patient have questions or concerns that need addressed prior to 7-14 day follow up office visit: no    Scheduled appointment with PCP within 7-14 days    Follow Up  Future Appointments   Date Time Provider Department Center   3/11/2024  3:30 PM Yi Weinstein PA [facility]  [place] [facility]   2024 11:15 AM Gilberto Prince MD [facility]   10/28/2024 11:00 AM [facility] MAIN [facility] [place] [facility]   2024 11:00 AM Caesar Blood MD [facility] Sinha RN

## 2024-03-06 NOTE — CARE COORDINATION
Care Transitions Initial Follow Up Call    Call within 2 business days of discharge: Yes    Patient Current Location: Home: [address] [place] [address] [address]  [place] [address]    Care Transition Nurse contacted the patient by telephone to perform post hospital discharge assessment. Verified name and  with patient as identifiers. Provided introduction to self, and explanation of the Care Transition Nurse role.    Patient: Estrella Whiting Patient : 1955   MRN: [id number]  Reason for Admission: 1 day obs -> cp, palpitations, tachycardia, hypoMg, CAD-non obstructive, chronic dCHF, HTN, DM2, HLD, anxiety/depression, essential tremor, morbid obesity, tobacco abuse-smoking cessation recommended -> home no services, Holter monitor x 30 days, f/up Dr Blood for MIRIAN eval, AM-PAC 24OT (PT did not eval), enrolled in RPM 3/6  Discharge Date: 3/5/24 RARS: No data recorded    Last Discharge Facility       Date Complaint Diagnosis Description Type Department Provider    3/4/24 Chest Pain Palpitations ... ED to Hosp-Admission (Discharged) (ADMITTED) [facility] 2 Thomas Avendaño MD; T...     Challenges to be reviewed by the provider   Additional needs identified to be addressed with provider: No          Method of communication with provider: none    Denies cp, palpitations, SOB. Aware of new med magnesium sent to [facility]. Does not monitor VS or weight at home. Agreeable to [facility].     CHF education:  -if you take a water pill - do not skip doses (patient takes furosemide 40mg daily)  -weigh daily in the morning at the same time and in the same clothing  -report weight gain of >3#/day or >5#/week  -report increased SOB, edema, abd fullness, difficulty lying flat  -report palpitations, cough, difficulty urinating  -reviewed 2000 mg/day salt and 48-64 ounce/day fluid restrictions     She does not add salt to foods but is aware to rinse canned goods. Reviewed reading labels and limiting fluids. She voices understanding. PCP

## 2024-03-12 ENCOUNTER — CARE COORDINATION (OUTPATIENT)
Dept: CASE MANAGEMENT | Age: 69
End: 2024-03-12

## 2024-03-12 NOTE — CARE COORDINATION
CT [facility]   11/6/2024 11:00 AM [facility]     External follow up appointment(s): yasmin    Care Transition Nurse reviewed medical action plan and red flags with patient and discussed any barriers to care and/or understanding of plan of care after discharge. Discussed appropriate site of care based on symptoms and resources available to patient including: PCP  Specialist. The patient agrees to contact the PCP office for questions related to their healthcare.      Patients top risk factors for readmission: medical condition-   Interventions to address risk factors: Education of patient/family/caregiver/guardian to support self-management-f/up as scheduled    Offered patient enrollment in the Remote Patient Monitoring (RPM) program for in-home monitoring: Unable to enroll - cardiology would not sign off on alerts.     Care Transition Nurse provided contact information for future needs.   Plan for follow-up call in 7-10 days based on severity of symptoms and risk factors.    Su Casey RN  Care Transition Nurse  [phone] mobile

## 2024-03-18 ENCOUNTER — OFFICE VISIT (OUTPATIENT)
Dept: FAMILY MEDICINE CLINIC | Age: 69
End: 2024-03-18

## 2024-03-18 VITALS
HEIGHT: 62 IN | TEMPERATURE: 97.4 F | OXYGEN SATURATION: 96 % | SYSTOLIC BLOOD PRESSURE: 112 MMHG | HEART RATE: 109 BPM | DIASTOLIC BLOOD PRESSURE: 60 MMHG | WEIGHT: 219.8 LBS | BODY MASS INDEX: 40.45 KG/M2

## 2024-03-18 DIAGNOSIS — R00.2 PALPITATION: ICD-10-CM

## 2024-03-18 DIAGNOSIS — E11.69 DIABETES MELLITUS TYPE 2 IN OBESE (HCC): ICD-10-CM

## 2024-03-18 DIAGNOSIS — Z09 HOSPITAL DISCHARGE FOLLOW-UP: ICD-10-CM

## 2024-03-18 DIAGNOSIS — R07.9 CHEST PAIN, UNSPECIFIED TYPE: Primary | ICD-10-CM

## 2024-03-18 DIAGNOSIS — E66.9 DIABETES MELLITUS TYPE 2 IN OBESE (HCC): ICD-10-CM

## 2024-03-18 DIAGNOSIS — E83.42 HYPOMAGNESEMIA: ICD-10-CM

## 2024-03-18 RX ORDER — CALCIUM CARBONATE 500 MG/1
TABLET, CHEWABLE ORAL
COMMUNITY

## 2024-03-18 RX ORDER — ERGOCALCIFEROL 1.25 MG/1
50000 CAPSULE ORAL WEEKLY
COMMUNITY
Start: 2024-01-25 | End: 2024-03-18

## 2024-03-18 SDOH — ECONOMIC STABILITY: INCOME INSECURITY: HOW HARD IS IT FOR YOU TO PAY FOR THE VERY BASICS LIKE FOOD, HOUSING, MEDICAL CARE, AND HEATING?: NOT HARD AT ALL

## 2024-03-18 SDOH — ECONOMIC STABILITY: FOOD INSECURITY: WITHIN THE PAST 12 MONTHS, YOU WORRIED THAT YOUR FOOD WOULD RUN OUT BEFORE YOU GOT MONEY TO BUY MORE.: NEVER TRUE

## 2024-03-18 SDOH — ECONOMIC STABILITY: FOOD INSECURITY: WITHIN THE PAST 12 MONTHS, THE FOOD YOU BOUGHT JUST DIDN'T LAST AND YOU DIDN'T HAVE MONEY TO GET MORE.: NEVER TRUE

## 2024-03-18 ASSESSMENT — PATIENT HEALTH QUESTIONNAIRE - PHQ9
4. FEELING TIRED OR HAVING LITTLE ENERGY: NEARLY EVERY DAY
SUM OF ALL RESPONSES TO PHQ QUESTIONS 1-9: 7
7. TROUBLE CONCENTRATING ON THINGS, SUCH AS READING THE NEWSPAPER OR WATCHING TELEVISION: NOT AT ALL
SUM OF ALL RESPONSES TO PHQ9 QUESTIONS 1 & 2: 1
1. LITTLE INTEREST OR PLEASURE IN DOING THINGS: SEVERAL DAYS
9. THOUGHTS THAT YOU WOULD BE BETTER OFF DEAD, OR OF HURTING YOURSELF: NOT AT ALL
10. IF YOU CHECKED OFF ANY PROBLEMS, HOW DIFFICULT HAVE THESE PROBLEMS MADE IT FOR YOU TO DO YOUR WORK, TAKE CARE OF THINGS AT HOME, OR GET ALONG WITH OTHER PEOPLE: SOMEWHAT DIFFICULT
6. FEELING BAD ABOUT YOURSELF - OR THAT YOU ARE A FAILURE OR HAVE LET YOURSELF OR YOUR FAMILY DOWN: NOT AT ALL
8. MOVING OR SPEAKING SO SLOWLY THAT OTHER PEOPLE COULD HAVE NOTICED. OR THE OPPOSITE, BEING SO FIGETY OR RESTLESS THAT YOU HAVE BEEN MOVING AROUND A LOT MORE THAN USUAL: NOT AT ALL
SUM OF ALL RESPONSES TO PHQ QUESTIONS 1-9: 7
3. TROUBLE FALLING OR STAYING ASLEEP: NEARLY EVERY DAY
SUM OF ALL RESPONSES TO PHQ QUESTIONS 1-9: 7
SUM OF ALL RESPONSES TO PHQ QUESTIONS 1-9: 7
2. FEELING DOWN, DEPRESSED OR HOPELESS: NOT AT ALL
5. POOR APPETITE OR OVEREATING: NOT AT ALL

## 2024-03-18 NOTE — PROGRESS NOTES
PHENERGAN  Take 1 tablet by mouth every 8 hours as needed for Nausea     spironolactone 25 MG tablet  Commonly known as: ALDACTONE  Take 1 tablet by mouth once daily     Tums 500 MG chewable tablet  Generic drug: calcium carbonate     * vitamin D 09005 UNIT Caps  Commonly known as: CHOLECALCIFEROL  Take 1 capsule by mouth once a week     * Cholecalciferol 50 MCG (2000 UT) Tabs  Take 1 tablet by mouth daily Take 1 tablet by mouth daily. Start this medication after you finish the weekly regimen           * This list has 2 medication(s) that are the same as other medications prescribed for you. Read the directions carefully, and ask your doctor or other care provider to review them with you.                    Medications marked \"taking\" at this time  Outpatient Medications Marked as Taking for the 3/18/24 encounter (Office Visit) with Yi Weinstein PA   Medication Sig Dispense Refill    calcium carbonate (TUMS) 500 MG chewable tablet       magnesium oxide (MAG-OX) 400 (240 Mg) MG tablet Take 1 tablet by mouth daily 30 tablet 0    atorvastatin (LIPITOR) 40 MG tablet Take 1 tablet by mouth daily 90 tablet 5    vitamin D (CHOLECALCIFEROL) 19504 UNIT CAPS Take 1 capsule by mouth once a week 12 capsule 0    Cholecalciferol 50 MCG (2000 UT) TABS Take 1 tablet by mouth daily Take 1 tablet by mouth daily. Start this medication after you finish the weekly regimen 90 tablet 2    metFORMIN (GLUCOPHAGE) 500 MG tablet Take 1 tablet by mouth daily (with breakfast) 90 tablet 1    DULoxetine (CYMBALTA) 60 MG extended release capsule Take 1 capsule by mouth once daily 90 capsule 1    albuterol sulfate HFA (PROVENTIL HFA) 108 (90 Base) MCG/ACT inhaler Inhale 2 puffs into the lungs every 4 hours as needed for Wheezing or Shortness of Breath 18 g 6    furosemide (LASIX) 40 MG tablet Take 1 tablet by mouth once daily 90 tablet 3    dilTIAZem (CARDIZEM CD) 360 MG extended release capsule Take 1 capsule by mouth daily 90 capsule 3

## 2024-03-19 LAB
ALBUMIN SERPL-MCNC: 4.6 G/DL (ref 3.4–5)
ALBUMIN/GLOB SERPL: 1.7 {RATIO} (ref 1.1–2.2)
ALP SERPL-CCNC: 125 U/L (ref 40–129)
ALT SERPL-CCNC: 31 U/L (ref 10–40)
ANION GAP SERPL CALCULATED.3IONS-SCNC: 17 MMOL/L (ref 3–16)
AST SERPL-CCNC: 20 U/L (ref 15–37)
BILIRUB SERPL-MCNC: <0.2 MG/DL (ref 0–1)
BUN SERPL-MCNC: 23 MG/DL (ref 7–20)
CALCIUM SERPL-MCNC: 9.9 MG/DL (ref 8.3–10.6)
CHLORIDE SERPL-SCNC: 102 MMOL/L (ref 99–110)
CO2 SERPL-SCNC: 21 MMOL/L (ref 21–32)
CREAT SERPL-MCNC: 1.1 MG/DL (ref 0.6–1.2)
CREAT UR-MCNC: 98.4 MG/DL (ref 28–259)
GFR SERPLBLD CREATININE-BSD FMLA CKD-EPI: 54 ML/MIN/{1.73_M2}
GLUCOSE SERPL-MCNC: 121 MG/DL (ref 70–99)
MAGNESIUM SERPL-MCNC: 1.7 MG/DL (ref 1.8–2.4)
MICROALBUMIN UR DL<=1MG/L-MCNC: <1.2 MG/DL
MICROALBUMIN/CREAT UR: NORMAL MG/G (ref 0–30)
POTASSIUM SERPL-SCNC: 4.6 MMOL/L (ref 3.5–5.1)
PROT SERPL-MCNC: 7.3 G/DL (ref 6.4–8.2)
SODIUM SERPL-SCNC: 140 MMOL/L (ref 136–145)

## 2024-03-20 ENCOUNTER — CARE COORDINATION (OUTPATIENT)
Dept: CASE MANAGEMENT | Age: 69
End: 2024-03-20

## 2024-03-20 NOTE — CARE COORDINATION
Care Transitions Follow Up Call    Patient Current Location: Home: [address] [place] [address] [address] [place] [address]    Care Transition Nurse contacted the patient by telephone to follow up after recent hospital admission.      Patient: Estrella Whiting  Patient : 1955   MRN: [id number]  Reason for Admission: 1 day obs -> cp, palpitations, tachycardia, hypoMg, CAD-non obstructive, chronic dCHF, HTN, DM2, HLD, anxiety/depression, essential tremor, morbid obesity, tobacco abuse-smoking cessation recommended -> home no services, Holter monitor x 30 days, f/up Dr Blood for MIRIAN eval, AM-PAC 24OT (PT did not eval)   Discharge Date: 3/5/24 RARS: No data recorded    Needs to be reviewed by the provider   Additional needs identified to be addressed with provider: No         Method of communication with provider: none.    No further cp, palp. Lasix managing edema. Eliminating salt.     CHF education:  -if you take a water pill - do not skip doses  -weigh daily in the morning at the same time and in the same clothing  -report weight gain of >3#/day or >5#/week  -report increased SOB, edema, abd fullness, difficulty lying flat  -report palpitations, cough, difficulty urinating  -reviewed salt and fluid restrictions      Still needs to schedule with [facility] office for MIRIAN eval. She has office number states she will get in touch with them this week. Denies CTN assistance.    She would like a referral to dietician. Referral to care transition dietician per protocol she will watch for call.     Addressed changes since last contact:  OV with PCP and labs - notes reviewed    Follow Up  Future Appointments   Date Time Provider Department Center   2024 11:15 AM Gilberto Prince MD P CLER [facility]   2024  8:30 AM Yi Weinstein PA [facility] [place] [facility]   10/28/2024 11:00 AM [facility] CT MAIN [facility]   2024 11:00 [facility]     Care Transition Nurse reviewed medical action

## 2024-03-25 ENCOUNTER — CARE COORDINATION (OUTPATIENT)
Dept: CARE COORDINATION | Age: 69
End: 2024-03-25

## 2024-03-25 NOTE — CARE COORDINATION
Contacted Estrella Whiting regarding Dietitian referral 2/2 hypomagnesemia and fatty liver. Patient answered, RD explained reason for call and role in care. Patient requested RD send handouts in the mail and then follow up for nutrition assessment. RD verified patient's home address. RD will send the following handouts: Magnesium Content of Foods, Low Fat Nutrition Therapy, Fat Restricted 5-Day Sample Menu.    RD will call patient in two to three weeks to follow up and make sure pt received handouts in mail. RD will answer any nutrition related questions at this time.       Sanam Ramírez RDN, LD   [phone]

## 2024-03-27 ENCOUNTER — CARE COORDINATION (OUTPATIENT)
Dept: CASE MANAGEMENT | Age: 69
End: 2024-03-27

## 2024-03-27 NOTE — CARE COORDINATION
Care Transitions Follow Up Call    Patient Current Location: Home: [address] [place] [address] [address]  [place] [id number]    Care Transition Nurse contacted the patient by telephone to follow up after recent hospital admission.    Patient: Estrella Whiting  Patient : 1955   MRN: [id number]  Reason for Admission: 1 day obs -> cp, palpitations, tachycardia, hypoMg, CAD-non obstructive, chronic dCHF, HTN, DM2, HLD, anxiety/depression, essential tremor, morbid obesity, tobacco abuse-smoking cessation recommended -> home no services, Holter monitor x 30 days, f/up Dr Blood for MIRIAN eval, AM-PAC 24OT (PT did not eval)    Discharge Date: 3/5/24 RARS: No data recorded    Needs to be reviewed by the provider   Additional needs identified to be addressed with provider: No         Method of communication with provider: none.    Yesterday had palpitations all day and has since resolved. States nothing different with level of activity. Staying hydrated. Still wearing Holter monitor. Denies edema, SOB is at baseline. Denies concerns.     Addressed changes since last contact:  none  Discussed follow-up appointments. If no appointment was previously scheduled, appointment scheduling offered: Yes.   Is follow up appointment scheduled within 7 days of discharge? completed.    Follow Up  Future Appointments   Date Time Provider Department Center   2024 11:15 AM Gilberto Prince MD [facility]   2024  8:30 AM Yi Weinstein PA [facility]  [place] [facility]   10/28/2024 11:00 AM [facility] [place] [facility]   2024 11:00 AM Caesar Blood MD [facility]     External follow up appointment(s): yasmin    Care Transition Nurse reviewed medical action plan and red flags with patient and discussed any barriers to care and/or understanding of plan of care after discharge. Discussed appropriate site of care based on symptoms and resources available to patient including: PCP  Specialist. The patient agrees to contact the

## 2024-04-03 ENCOUNTER — CARE COORDINATION (OUTPATIENT)
Dept: CASE MANAGEMENT | Age: 69
End: 2024-04-03

## 2024-04-03 NOTE — CARE COORDINATION
site of care based on symptoms and resources available to patient including: PCP  Specialist. The patient agrees to contact the PCP office for questions related to their healthcare.      Patients top risk factors for readmission: medical condition-   Interventions to address risk factors: Education of patient/family/caregiver/guardian to support self-management-     Offered patient enrollment in the Remote Patient Monitoring (RPM) program for in-home monitoring: Patient is not eligible for RPM program.     Care Transition Nurse provided contact information for future needs.   Handoff to [facility] Jenn Avery  based on severity of symptoms and risk factors.    Su Casey RN  Care Transition Nurse  [phone] mobile

## 2024-04-10 ENCOUNTER — CARE COORDINATION (OUTPATIENT)
Dept: CARE COORDINATION | Age: 69
End: 2024-04-10

## 2024-04-10 NOTE — CARE COORDINATION
Ambulatory Care Coordination Note  4/10/2024    Patient Current Location:  Home: [address] [place] [address] [address] [place] [id number]     ACM contacted the patient by telephone. Verified name and  with patient as identifiers. Provided introduction to self, and explanation of the ACM role.     Challenges to be reviewed by the provider   Additional needs identified to be addressed with provider: No  none               Method of communication with provider: chart routing.    ACM: Jenn Avery, LEILA     ACM completed follow up call with patient who said she is finishing up her holter monitor this week. Patient would like to enroll next week in RPM. Patient denies any other needs at this time.     Plan:    F/U call 1 week   Complete enrollment  RPM enrollment    Offered patient enrollment in the Remote Patient Monitoring (RPM) program for in-home monitoring: Yes, but did not enroll at this time.    Lab Results       None                 Goals Addressed    None         Future Appointments   Date Time Provider Department Center   2024 11:15 AM Gilberto Prince MD P CLER [facility]   2024  8:30 AM Yi Weinstein PA [facility]  [place] [facility]   10/28/2024 11:00 AM [facility] [place] [facility]   2024 11:00 AM [facility]

## 2024-04-17 ENCOUNTER — CARE COORDINATION (OUTPATIENT)
Dept: CARE COORDINATION | Age: 69
End: 2024-04-17

## 2024-04-17 NOTE — TELEPHONE ENCOUNTER
Take lasix 40mg BID for 3 days (take 2nd dose mid-afternoon 3-4pm) and see how she responds. Avoid salt, restrict fluids < 2L, and elevate legs when off them. Let us know how she does afterwards.

## 2024-04-17 NOTE — CARE COORDINATION
Ambulatory Care Coordination Note  2024    Patient Current Location:  Home: [address] [place] [address] [address] [place], [place] [id number]     ACM contacted the patient by telephone. Verified name and  with patient as identifiers. Provided introduction to self, and explanation of the ACM role.     Challenges to be reviewed by the provider   Additional needs identified to be addressed with provider: No  none               Method of communication with provider: chart routing.    ACM: Jenn Avery RN     ACM completed follow up call with patient who said she is finishing up with Cardiac monitoring patches which she has 67 hours left. Patient would like to do RPM once finished with cardiac monitoring this week. Patient said she does not have a scale and has noticed her legs and wrists are more swollen than baseline. Patient is complaint with diuretics. ACM asked for a daily weight but patient said she does not own a scale. ACM encouraged patient the importance of monitoring daily weights with CHF. Patient encouraged to call Cardio office regarding increased swelling more than baseline.     Plan:    Route to Cardio  F/U call 1 week  Offer RPM     Offered patient enrollment in the Remote Patient Monitoring (RPM) program for in-home monitoring: Yes, but did not enroll at this time.    Lab Results       None                 Goals Addressed    None         Future Appointments   Date Time Provider Department Center   2024 11:15 AM Gilberto Prince MD [facility]   2024  8:30 AM Yi Weinstein PA [facility]  [place] [facility]   10/28/2024 11:00 AM MHC CT MAIN [facility] CT [facility] [place] [facility]   2024 11:00 AM Caesar Blood MD [facility]    and   Congestive Heart Failure Assessment       Swelling (worse than baseline) in hands, feet/legs or around abdomen      Symptoms:

## 2024-04-23 ENCOUNTER — OFFICE VISIT (OUTPATIENT)
Dept: CARDIOLOGY CLINIC | Age: 69
End: 2024-04-23
Payer: MEDICARE

## 2024-04-23 VITALS
HEIGHT: 62 IN | HEART RATE: 105 BPM | DIASTOLIC BLOOD PRESSURE: 60 MMHG | SYSTOLIC BLOOD PRESSURE: 120 MMHG | OXYGEN SATURATION: 93 % | WEIGHT: 221 LBS | BODY MASS INDEX: 40.67 KG/M2

## 2024-04-23 DIAGNOSIS — R00.2 PALPITATIONS: Primary | ICD-10-CM

## 2024-04-23 PROCEDURE — 3074F SYST BP LT 130 MM HG: CPT | Performed by: INTERNAL MEDICINE

## 2024-04-23 PROCEDURE — 3078F DIAST BP <80 MM HG: CPT | Performed by: INTERNAL MEDICINE

## 2024-04-23 PROCEDURE — 1036F TOBACCO NON-USER: CPT | Performed by: INTERNAL MEDICINE

## 2024-04-23 PROCEDURE — G8399 PT W/DXA RESULTS DOCUMENT: HCPCS | Performed by: INTERNAL MEDICINE

## 2024-04-23 PROCEDURE — 3017F COLORECTAL CA SCREEN DOC REV: CPT | Performed by: INTERNAL MEDICINE

## 2024-04-23 PROCEDURE — G8417 CALC BMI ABV UP PARAM F/U: HCPCS | Performed by: INTERNAL MEDICINE

## 2024-04-23 PROCEDURE — 99214 OFFICE O/P EST MOD 30 MIN: CPT | Performed by: INTERNAL MEDICINE

## 2024-04-23 PROCEDURE — 1090F PRES/ABSN URINE INCON ASSESS: CPT | Performed by: INTERNAL MEDICINE

## 2024-04-23 PROCEDURE — 1123F ACP DISCUSS/DSCN MKR DOCD: CPT | Performed by: INTERNAL MEDICINE

## 2024-04-23 PROCEDURE — G8427 DOCREV CUR MEDS BY ELIG CLIN: HCPCS | Performed by: INTERNAL MEDICINE

## 2024-04-23 RX ORDER — FUROSEMIDE 40 MG/1
40 TABLET ORAL 2 TIMES DAILY
Qty: 90 TABLET | Refills: 3
Start: 2024-04-23

## 2024-04-23 NOTE — PROGRESS NOTES
[facility]   Cardiac Office Note    Referring Provider:  Yi Weinstein PA     No chief complaint on file.      Originally saw 5/20/22  Subjective: Ms. Whiting is here today to for routine cardiology follow up; c/o palpitations today     History of Present Illness:  Estrella Whiting is a 68 y.o. female who has PMH HTN, NOCAD dx 2/23, tobacco abuse (quit 2010--1ppd prior), and Fibromyalgia. GXT stress ECHO 11/12/14 Negative stress echo with no indication of inducible ischemia. EKG 3/31/22 SR 97 bpm; NST change.   48 hour monitor 5/20-5/23/22 Predominately NSR, PAC's 0.02%, PVC 0.01%. ECHO 6/15/22 LVEF>65%. mild cLVH. Grade I DD. Mild MAC; Trace-mild MR; trace TR. Elevated HR throughout study.  Dr. Blood ordered PFT 9/21/2022 noted borderline mild restrictive lung defect and 6 MWT which was normal without hypoxia walking.No concerning issues on PFT's or CT imaging to explain SOB. 6MWT normal also. RHC/LHC 2/28/2023 mild NOCAD (20-30% multiple branches, mildly elevated right and left filling pressure (LVEDP 17, RA=13).  OV with Polo Pinto NP 3/23 started spironolactone 25 mg daily and lasix increased to 40mg BID.   Since LOV 7/26/23, admitted to the hospital 3/4/24 for c/o palpitations and lightheadedness. Most recent Echo 3/5/24 EF 55%, Grade 1 DD, mild LVH; no valve dz; no sig change from 6/15/22. EKG 3/4/23 NSR 85bpm w/ PAC, possible inferior infarct.  30 day monitor ordered at discharge.    Today she reports feeling fine other than fatigue. Continues to walk her dog at least 4 x a day.  Tries to continue to get in 10,000 steps.  She states prior to going the ED in March she was feeling her heart race/beat hard daily and SOB. She continues to feel the palpitations but are less intense, brief in duration, and only occurring 2-3 times a week.  She has not been tested for MIRIAN but snores. Patient denies chest pain, sob, dizziness or syncope. The patient is compliant with medications.  Cost of medications is  Anesthesia Pre-Procedure Evaluation    Patient: Kinjal Moe   MRN: [id number] : 1967        Procedure : Procedure(s):  CYSTOSCOPY, VIA MITROFANOFF, ABLATION OF GRANULOMA          Past Medical History:   Diagnosis Date     Chondromalacia of left patella 2022     Closed fracture of body of scapula 2020     Closed fracture of lumbar vertebra (H) 2020     Closed fracture of multiple ribs 2020    Left 3-12, Right 3-7     Contusion of lung 2020     Encounter for attention to gastrostomy (H) 2020     Fracture of multiple ribs with flail chest 2020     Fracture of skull and facial bones (H) 2020     History of blood transfusion 2020    Happened during emergency surgery related to 20  fall from a ladder.     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)      TBI (traumatic brain injury)      Thrombocytopenia (H) 2020     Traumatic brain injury with depressed skull fracture with loss of consciousness with delayed healing 2020     Traumatic shock (H) 2020      Past Surgical History:   Procedure Laterality Date     BACK SURGERY  2020    From accident in 2020     COLONOSCOPY       CRANIOPLASTY  2020    CRANIOPLASTY, right bone flap replacement (Right )     CYSTOSCOPY VIA MITROFANOFF N/A 10/14/2022    Procedure: MITROFANOFF REVISION;  Surgeon: Kyle Guerrero MD;  Location: [facility] OR     CYSTOSTOMY, INSERT TUBE SUPRAPUBIC, COMBINED N/A 2021    Procedure: Suprapubic tube placement;  Surgeon: Kyle Guerrero MD;  Location: [facility] OR     Decompression of spine  2020     Posterior Left L1 transpedicular decompression, T12-L1 discectomy and interbody fusion with morcelized allograft, T12, L1, and superior L2 laminectomies, repair dural laceration, T8-L4 instrumented posterolateral fusion, DePuy Synthes 5.5 mm Cobalt Chromium rods, Femoral head allograft, local graft; Operating Microscope, O-arm and Stealth image guidance (N/A Back)     LAPAROSCOPIC COLOSTOMY N/A 05/20/2022    Procedure: Laparoscopic partial colectomy, colostomy creation;  Surgeon: Rolando Walden MD;  Location: [facility] OR     LAPAROSCOPIC COLOSTOMY  05/20/2022    Procedure: ;  Surgeon: Rolando Walden MD;  Location: [facility] OR     MITROFANOFF PROCEDURE (APPENDIX CONDUIT) N/A 12/29/2021    Procedure: CREATION, APPENDICOVESICOSTOMY, MITROFANOFF,;  Surgeon: Kyle Guerrero MD;  Location: [facility] OR     ORTHOPEDIC SURGERY  7/2010    Fractured wrist was repaired with titanium plates.     PEG TUBE PLACEMENT       R incision reopening, epidural evacuation, drain placement (Right Head)  06/21/2020     REVISE ILEAL LOOP CONDUIT N/A 06/24/2022    Procedure: REVISION, Mitrfoanoff;  Surgeon: Kyle Guerrero MD;  Location: [facility] OR     SLING TRANSPUBO WITH ANTERIOR COLPORRHAPHY, COMBINED N/A 12/29/2021    Procedure: Creation of catheterizable channel with pubovaginal sling;  Surgeon: Kyle Guerrero MD;  [facility]     SUBDURAL HEMATOMA EVACUATION VIA CRANIOTOMY  06/21/2020     TRACHEOSTOMY  07/02/2020     WRIST SURGERY Right 2010    ORIF      Allergies   Allergen Reactions     Penicillins Hives, Itching, Other (See Comments) and Rash     As infant        Social History     Tobacco Use     Smoking status: Never     Smokeless tobacco: Never     Tobacco comments:     I'm not a smoker.   Substance Use Topics     Alcohol use: Not Currently      Wt Readings from Last 1 Encounters:   02/19/23 53.1 kg (117 lb)        Anesthesia Evaluation   Pt has had prior anesthetic. Type: General and MAC.    History  of anesthetic complications   Aug 2020-slow to wake, but no issues since.    ROS/MED HX  ENT/Pulmonary:  - neg pulmonary ROS  (-) tobacco use and recent URI   Neurologic: Comment: History TBI, post traumatic fall in 6/2020 resulting in craniotomy, trach, PEG. SCI L1s/p T8-L4 spine surgery. Incomplete paraplegia    (+) seizures, last seizure: 2/2021, features: grand mal-manages with gabapentin ,     Cardiovascular: Comment: Denies cardiac symptoms    (+) -----Previous cardiac testing   Echo: Date: Results:    Stress Test: Date: Results:    ECG Reviewed: Date: 2/17/21 Results:  SR  Cath: Date: Results:   (-) taking anticoagulants/antiplatelets   METS/Exercise Tolerance: >4 METS Comment: Uses manual wheelchair, scoots herself. Works out for 45 minutes 3 X weekly in gym at [employer]   Hematologic:     (+) anemia, history of blood transfusion, no previous transfusion reaction, Known PRBC Anitbodies:No     Musculoskeletal: Comment: History fracture of right tibial plateau complicated by infection now resolved    Right wrist fracture s/p right wrist repair      GI/Hepatic: Comment: Neurogenic bowel s/p colostomy      Renal/Genitourinary: Comment: Neurogenic bladder, s/p APV. Self caths six times daily. Some stenosis.      Endo:  - neg endo ROS     Psychiatric/Substance Use:  - neg psychiatric ROS     Infectious Disease:  - neg infectious disease ROS     Malignancy:  - neg malignancy ROS     Other:      (+) , other significant disability Wheelchair bound,          Physical Exam    Airway        Mallampati: II   TM distance: > 3 FB   Neck ROM: limited   Mouth opening: > 3 cm    Respiratory Devices and Support         Dental           Cardiovascular   cardiovascular exam normal          Pulmonary   pulmonary exam normal                OUTSIDE LABS:  CBC:   Lab Results   Component Value Date    WBC 3.0 (L) 12/06/2022    WBC 4.1 10/06/2022    HGB 13.8 02/15/2023    HGB 10.9 (L) 12/06/2022    HCT 34.7 (L) 12/06/2022    HCT  40.5 10/06/2022     12/06/2022     10/06/2022     BMP:   Lab Results   Component Value Date     12/06/2022     10/06/2022    POTASSIUM 4.1 12/06/2022    POTASSIUM 3.7 10/06/2022    CHLORIDE 110 (H) 12/06/2022    CHLORIDE 109 10/06/2022    CO2 31 12/06/2022    CO2 31 10/06/2022    BUN 16 12/06/2022    BUN 12 10/06/2022    CR 0.47 (L) 12/06/2022    CR 0.50 (L) 10/06/2022    GLC 98 12/06/2022    GLC 71 10/06/2022     COAGS:   Lab Results   Component Value Date    INR 1.11 08/05/2021     POC:   Lab Results   Component Value Date    BGM 83 02/17/2021     HEPATIC:   Lab Results   Component Value Date    ALBUMIN 3.6 06/15/2022    PROTTOTAL 6.9 06/15/2022    ALT 22 06/15/2022    AST 10 06/15/2022    ALKPHOS 144 06/15/2022    BILITOTAL 0.3 06/15/2022     OTHER:   Lab Results   Component Value Date    LACT 1.5 05/14/2022    HOPE 8.2 (L) 12/06/2022    MAG 2.0 05/21/2022    LIPASE 208 12/15/2021    TSH 0.38 (L) 10/06/2022    T4 1.04 10/06/2022    CRP <2.9 12/15/2021       Anesthesia Plan    ASA Status:  3   NPO Status:  NPO Appropriate    Anesthesia Type: General.     - Airway: LMA   Induction: Intravenous.   Maintenance: Balanced.        Consents    Anesthesia Plan(s) and associated risks, benefits, and realistic alternatives discussed. Questions answered and patient/representative(s) expressed understanding.    - Discussed:     - Discussed with:  Patient, Spouse      - Extended Intubation/Ventilatory Support Discussed: No.      - Patient is DNR/DNI Status: No    Use of blood products discussed: No .     Postoperative Care    Pain management: IV analgesics, Oral pain medications, Multi-modal analgesia.   PONV prophylaxis: Ondansetron (or other 5HT-3), Background Propofol Infusion, Dexamethasone or Solumedrol     Comments:                Braydon Mitchell MD

## 2024-04-25 ENCOUNTER — CARE COORDINATION (OUTPATIENT)
Dept: PRIMARY CARE CLINIC | Age: 69
End: 2024-04-25

## 2024-04-25 ENCOUNTER — CARE COORDINATION (OUTPATIENT)
Dept: CARE COORDINATION | Age: 69
End: 2024-04-25

## 2024-04-25 DIAGNOSIS — I50.32 CHRONIC DIASTOLIC (CONGESTIVE) HEART FAILURE (HCC): Primary | ICD-10-CM

## 2024-04-25 NOTE — CARE COORDINATION
Ambulatory Care Coordination Note  2024    Patient Current Location:  Home: [address] [place] [address] [address]  [facility] [id number]     ACM contacted the patient by telephone. Verified name and  with patient as identifiers. Provided introduction to self, and explanation of the ACM role.     Challenges to be reviewed by the provider   Additional needs identified to be addressed with provider: No  none               Method of communication with provider: chart routing.    ACM: Jenn Avery RN     ACM completed follow up call with patient who said she is doing well at this time except for a stomach ache. Patient said she would like to enroll in to RPM at this time.     Plan:    F/U call 1 week  RPM enrollment    Offered patient enrollment in the Remote Patient Monitoring (RPM) program for in-home monitoring: Yes, patient enrolled today:     Remote Patient Monitoring Enrollment Note    Date/Time:  2024 9:41 AM    Offered patient enrollment in the [facility] Remote Patient Monitoring (RPM) program for in home monitoring for CHF; condition managed by Cardio.  Patient accepted.    Patient will be monitoring the following daily:  Weight    ACM reviewed the information below with the patient:    Emergency Contact (name and contact number): Hanane Bush [phone]    [x]  A member from the care coordination team will reach out to notify the patient once the RPM kit is ordered.  [x]  Once the kit is delivered, the HRS team will contact the patient after UPS delivers to assist with set up.  [x]  Determined BP cuff size: regular (9.05\"-15.74\")  [x]  Determined weight scale: regular (<330lbs)  [x]  Hours of ACM monitoring - Monday-Friday [id number]  [x]  It is important to take your vitals every day, even on the weekends, to keep your care team aware of how you are doing every day of the week.    All questions about RPM program answered at this time.    Lab Results       None                 Goals Addressed

## 2024-04-25 NOTE — PROGRESS NOTES
Remote Patient Monitoring Treatment Plan    Received request from [facility]/Jenn Montoya RN   to order remote patient monitoring for in home monitoring of CHF; Condition managed by Dr. Gilberto Prince ([facility]).  and order completed.     Patient will be monitoring weight.      Patient will engage in Remote Patient Monitoring each day to develop the skills necessary for self management.       RPM Care Team Responsibilities:   Alerts will be reviewed daily and addressed within 2-4 hours during operational hours (Monday -Friday 9 am-4 pm)  Alert response and intervention documented in patient medical record  Alert response escalated to PCP per protocol and documented in patient medical record  Patient monitored over approximately  days  Discharge from program based on self-management readiness    See care coordination encounters for additional details.

## 2024-04-25 NOTE — CARE COORDINATION
RPM Kit Order    Remote Patient Kit Ordering Note      Date/Time:  4/25/2024 11:06 AM      CCSS placed phone call to patient/family today to notify of RPM kit order; patient/family was available; discussed the following topics below and all questions answered.    [x] [facility]  [x] Patient will receive package over the next 1-3 business days. Someone 21 years or older must be present to sign for UPS delivery.  [x] HRS will contact patient within 24 hours, an HRS  will call the patient directly: If the patient does not answer, HRS will follow up with the clinical team notifying them about the unsuccessful attempt to contact the patient. HRS will make three call attempts to the patient.Provide patient with [facility] Virtual install number is: [phone].  [x] ACM will contact patient once equipment is active to welcome them to the program.                                                         [x] Hours of RPM monitoring - Monday-Friday [id number]; encourage patient to get vitals entered by Noon each day to have the alert addressed same day.  [x][facility] flyer to patient.                      ACM made aware the RPM kit has been ordered.

## 2024-04-30 ENCOUNTER — CARE COORDINATION (OUTPATIENT)
Dept: CARE COORDINATION | Age: 69
End: 2024-04-30

## 2024-04-30 NOTE — CARE COORDINATION
Contacted Estrella Whiting regarding Dietitian follow up. Patient answered and states that she is not home. Patient requested RD call back tomorrow afternoon. Will contact patient tomorrow, per her request, and follow up as appropriate.       Sanam Ramírez RDN, LD   [phone]

## 2024-05-01 ENCOUNTER — CARE COORDINATION (OUTPATIENT)
Dept: CARE COORDINATION | Age: 69
End: 2024-05-01

## 2024-05-01 DIAGNOSIS — R00.0 TACHYCARDIA: ICD-10-CM

## 2024-05-01 DIAGNOSIS — R00.2 PALPITATIONS: ICD-10-CM

## 2024-05-01 NOTE — CARE COORDINATION
Estrella Whiting  5/1/2024    Registered Dietitian Progress Note for Care Coordination    Assessment: Estrella is a 68 y.o. female.  RD referred for hypomagnesemia and fatty liver. RD spoke with patient for initial nutrition assessment on 3/25/24. RD called to follow up with patient today, 5/1/24. Patient states that she received the nutrition handouts RD sent in the mail, and didn't have any questions. Patient continues to take Magnesium OTC. Note no new lab value for magnesium since 3/18/24 (1.70 mg/dL). Patient continues to follow a low fat and low sodium diet. Patient has been consuming lean meats, such as chicken and turkey. Patient states that she has been making homemade chicken soup as she can control the amount of sodium that goes into it. Patient reports that she has been trying to \"stay away\" from processed foods. Patient has no nutrition-related questions/concerns. RD will sign off. RD encouraged patient to reach out should any nutrition-related questions/concerns arise. Patient verbalized understanding and thanked RD.    Plan of Care:  RD encouraged patient to keep working toward goals set.     Follow Up:    None.    Sanam Ramírez RDN, LD   [phone]

## 2024-05-02 ENCOUNTER — TELEPHONE (OUTPATIENT)
Dept: CARDIOLOGY CLINIC | Age: 69
End: 2024-05-02

## 2024-05-02 NOTE — TELEPHONE ENCOUNTER
----- Message from Jayme Mayen MD sent at 5/2/2024  7:49 AM EDT -----  Cardiac monitor shows no significant cardiac arrhythmia.  Please call patient.  ----- Message -----  From: Lary Galan  Sent: 5/1/2024  11:38 AM EDT  To: Jayme Mayen MD

## 2024-05-06 ENCOUNTER — CARE COORDINATION (OUTPATIENT)
Dept: CARE COORDINATION | Age: 69
End: 2024-05-06

## 2024-05-06 NOTE — CARE COORDINATION
ACM called but patient did not answer. ACM left voice message with call back number provided. ACM will follow up at a later date.

## 2024-05-09 ENCOUNTER — CARE COORDINATION (OUTPATIENT)
Dept: PRIMARY CARE CLINIC | Age: 69
End: 2024-05-09

## 2024-05-09 NOTE — CARE COORDINATION
RPM Install Assistance    Received HRS Notification : HRS unable to reach patient by phone for activation x 3 attempts. Patient was available;  to discuss.  Routed to  ACM/CTN  to update.     Patient states she does have equipment and is still interested. Patient reports her best friend fell the day she got her equipment and broke her hip (89 years old) and has been assisting friend since. Patient reports she will sit down and call HRS on Monday 5/13/24

## 2024-05-13 ENCOUNTER — CARE COORDINATION (OUTPATIENT)
Dept: CARE COORDINATION | Age: 69
End: 2024-05-13

## 2024-05-13 NOTE — CARE COORDINATION
Ambulatory Care Coordination Note  2024    Patient Current Location:  Home: [address] [place] [address] [address]  [facility] [address]     ACM contacted the patient by telephone. Verified name and  with patient as identifiers. Provided introduction to self, and explanation of the ACM role.     Challenges to be reviewed by the provider   Additional needs identified to be addressed with provider: No  none               Method of communication with provider: chart routing.    ACM: Jenn Avery RN       ACM completed outreach call to patient who said she will set up RPM equipment tomorrow to start monitoring. ACM informed patient if this is not set up by Wednesday equipment would have to be returned. Patient agreed with ACM at this time.     Heart Failure Education outreach Date/Time: 2024 9:57 AM    Ambulatory Care Manager (ACM) contacted the patient by telephone to perform Ambulatory Care Coordination. Verified name and  with patient as identifiers. Provided introduction to self, and explanation of the Ambulatory Care Manager's role.     ACM reviewed that a Heart Healthy tips for the Summer packet has been sent to [employer]. ACM reviewed CHF zones, daily weights, fluid restriction, the importance of low sodium diet, and healthy tips packet with the patient. Instructed patient to call their Cardiologist if they have a weight gain of 3 lbs in 2 days or 5 lbs in a week.     Patient reminded that there is a physician on call 24 hours a day / 7 days a week should the patient have questions or concerns. The patient verbalized understanding.     Offered patient enrollment in the Remote Patient Monitoring (RPM) program for in-home monitoring: Yes, patient enrolled; current status is preactivatedPatietn will install 24 .      Plan:    F/U call 2 weeks    Lab Results       None                 Goals Addressed    None         Future Appointments   Date Time Provider Department Center   2024  8:30 AM Yi Weinstein

## 2024-05-16 ENCOUNTER — CARE COORDINATION (OUTPATIENT)
Dept: CARE COORDINATION | Age: 69
End: 2024-05-16

## 2024-05-20 ENCOUNTER — TELEPHONE (OUTPATIENT)
Dept: FAMILY MEDICINE CLINIC | Age: 69
End: 2024-05-20

## 2024-05-20 ENCOUNTER — OFFICE VISIT (OUTPATIENT)
Dept: FAMILY MEDICINE CLINIC | Age: 69
End: 2024-05-20
Payer: MEDICARE

## 2024-05-20 VITALS
TEMPERATURE: 98.1 F | OXYGEN SATURATION: 98 % | WEIGHT: 219.6 LBS | HEIGHT: 62 IN | DIASTOLIC BLOOD PRESSURE: 70 MMHG | BODY MASS INDEX: 40.41 KG/M2 | SYSTOLIC BLOOD PRESSURE: 110 MMHG | HEART RATE: 101 BPM

## 2024-05-20 DIAGNOSIS — E11.9 TYPE 2 DIABETES MELLITUS WITHOUT COMPLICATION, WITHOUT LONG-TERM CURRENT USE OF INSULIN (HCC): ICD-10-CM

## 2024-05-20 DIAGNOSIS — E83.42 HYPOMAGNESEMIA: ICD-10-CM

## 2024-05-20 DIAGNOSIS — I50.32 CHRONIC DIASTOLIC (CONGESTIVE) HEART FAILURE (HCC): Primary | ICD-10-CM

## 2024-05-20 DIAGNOSIS — E83.42 HYPOMAGNESEMIA: Primary | ICD-10-CM

## 2024-05-20 DIAGNOSIS — I25.10 CORONARY ARTERY DISEASE DUE TO LIPID RICH PLAQUE: ICD-10-CM

## 2024-05-20 DIAGNOSIS — R11.0 NAUSEA: ICD-10-CM

## 2024-05-20 DIAGNOSIS — E11.59 TYPE 2 DIABETES MELLITUS WITH OTHER CIRCULATORY COMPLICATION, WITHOUT LONG-TERM CURRENT USE OF INSULIN (HCC): ICD-10-CM

## 2024-05-20 DIAGNOSIS — I25.83 CORONARY ARTERY DISEASE DUE TO LIPID RICH PLAQUE: ICD-10-CM

## 2024-05-20 LAB
ALBUMIN SERPL-MCNC: 4.4 G/DL (ref 3.4–5)
ALBUMIN/GLOB SERPL: 1.5 {RATIO} (ref 1.1–2.2)
ALP SERPL-CCNC: 139 U/L (ref 40–129)
ALT SERPL-CCNC: 26 U/L (ref 10–40)
ANION GAP SERPL CALCULATED.3IONS-SCNC: 13 MMOL/L (ref 3–16)
AST SERPL-CCNC: 15 U/L (ref 15–37)
BILIRUB SERPL-MCNC: <0.2 MG/DL (ref 0–1)
BUN SERPL-MCNC: 18 MG/DL (ref 7–20)
CALCIUM SERPL-MCNC: 10.1 MG/DL (ref 8.3–10.6)
CHLORIDE SERPL-SCNC: 97 MMOL/L (ref 99–110)
CO2 SERPL-SCNC: 29 MMOL/L (ref 21–32)
CREAT SERPL-MCNC: 0.8 MG/DL (ref 0.6–1.2)
GFR SERPLBLD CREATININE-BSD FMLA CKD-EPI: 79 ML/MIN/{1.73_M2}
GLUCOSE SERPL-MCNC: 121 MG/DL (ref 70–99)
MAGNESIUM SERPL-MCNC: 2.2 MG/DL (ref 1.8–2.4)
POTASSIUM SERPL-SCNC: 4.7 MMOL/L (ref 3.5–5.1)
PROT SERPL-MCNC: 7.4 G/DL (ref 6.4–8.2)
SODIUM SERPL-SCNC: 139 MMOL/L (ref 136–145)

## 2024-05-20 PROCEDURE — G8427 DOCREV CUR MEDS BY ELIG CLIN: HCPCS | Performed by: PHYSICIAN ASSISTANT

## 2024-05-20 PROCEDURE — G8399 PT W/DXA RESULTS DOCUMENT: HCPCS | Performed by: PHYSICIAN ASSISTANT

## 2024-05-20 PROCEDURE — 1036F TOBACCO NON-USER: CPT | Performed by: PHYSICIAN ASSISTANT

## 2024-05-20 PROCEDURE — 2022F DILAT RTA XM EVC RTNOPTHY: CPT | Performed by: PHYSICIAN ASSISTANT

## 2024-05-20 PROCEDURE — 3017F COLORECTAL CA SCREEN DOC REV: CPT | Performed by: PHYSICIAN ASSISTANT

## 2024-05-20 PROCEDURE — 99214 OFFICE O/P EST MOD 30 MIN: CPT | Performed by: PHYSICIAN ASSISTANT

## 2024-05-20 PROCEDURE — 1090F PRES/ABSN URINE INCON ASSESS: CPT | Performed by: PHYSICIAN ASSISTANT

## 2024-05-20 PROCEDURE — G8417 CALC BMI ABV UP PARAM F/U: HCPCS | Performed by: PHYSICIAN ASSISTANT

## 2024-05-20 PROCEDURE — 3044F HG A1C LEVEL LT 7.0%: CPT | Performed by: PHYSICIAN ASSISTANT

## 2024-05-20 PROCEDURE — 3074F SYST BP LT 130 MM HG: CPT | Performed by: PHYSICIAN ASSISTANT

## 2024-05-20 PROCEDURE — 1123F ACP DISCUSS/DSCN MKR DOCD: CPT | Performed by: PHYSICIAN ASSISTANT

## 2024-05-20 PROCEDURE — 3078F DIAST BP <80 MM HG: CPT | Performed by: PHYSICIAN ASSISTANT

## 2024-05-20 RX ORDER — PROMETHAZINE HYDROCHLORIDE 12.5 MG/1
12.5 TABLET ORAL EVERY 8 HOURS PRN
Qty: 20 TABLET | Refills: 0 | Status: SHIPPED | OUTPATIENT
Start: 2024-05-20

## 2024-05-20 SDOH — ECONOMIC STABILITY: FOOD INSECURITY: WITHIN THE PAST 12 MONTHS, YOU WORRIED THAT YOUR FOOD WOULD RUN OUT BEFORE YOU GOT MONEY TO BUY MORE.: OFTEN TRUE

## 2024-05-20 SDOH — ECONOMIC STABILITY: FOOD INSECURITY: WITHIN THE PAST 12 MONTHS, THE FOOD YOU BOUGHT JUST DIDN'T LAST AND YOU DIDN'T HAVE MONEY TO GET MORE.: NEVER TRUE

## 2024-05-20 SDOH — ECONOMIC STABILITY: INCOME INSECURITY: HOW HARD IS IT FOR YOU TO PAY FOR THE VERY BASICS LIKE FOOD, HOUSING, MEDICAL CARE, AND HEATING?: NOT HARD AT ALL

## 2024-05-20 ASSESSMENT — PATIENT HEALTH QUESTIONNAIRE - PHQ9
4. FEELING TIRED OR HAVING LITTLE ENERGY: SEVERAL DAYS
SUM OF ALL RESPONSES TO PHQ QUESTIONS 1-9: 8
SUM OF ALL RESPONSES TO PHQ QUESTIONS 1-9: 8
7. TROUBLE CONCENTRATING ON THINGS, SUCH AS READING THE NEWSPAPER OR WATCHING TELEVISION: SEVERAL DAYS
SUM OF ALL RESPONSES TO PHQ QUESTIONS 1-9: 8
8. MOVING OR SPEAKING SO SLOWLY THAT OTHER PEOPLE COULD HAVE NOTICED. OR THE OPPOSITE, BEING SO FIGETY OR RESTLESS THAT YOU HAVE BEEN MOVING AROUND A LOT MORE THAN USUAL: NOT AT ALL
5. POOR APPETITE OR OVEREATING: MORE THAN HALF THE DAYS
SUM OF ALL RESPONSES TO PHQ9 QUESTIONS 1 & 2: 3
9. THOUGHTS THAT YOU WOULD BE BETTER OFF DEAD, OR OF HURTING YOURSELF: NOT AT ALL
2. FEELING DOWN, DEPRESSED OR HOPELESS: SEVERAL DAYS
1. LITTLE INTEREST OR PLEASURE IN DOING THINGS: MORE THAN HALF THE DAYS
3. TROUBLE FALLING OR STAYING ASLEEP: SEVERAL DAYS
10. IF YOU CHECKED OFF ANY PROBLEMS, HOW DIFFICULT HAVE THESE PROBLEMS MADE IT FOR YOU TO DO YOUR WORK, TAKE CARE OF THINGS AT HOME, OR GET ALONG WITH OTHER PEOPLE: SOMEWHAT DIFFICULT
SUM OF ALL RESPONSES TO PHQ QUESTIONS 1-9: 8

## 2024-05-20 ASSESSMENT — ENCOUNTER SYMPTOMS
NAUSEA: 0
RHINORRHEA: 0
ABDOMINAL PAIN: 0
VOMITING: 0
SORE THROAT: 0
DIARRHEA: 0
CONSTIPATION: 0
SHORTNESS OF BREATH: 0
COUGH: 0

## 2024-05-20 ASSESSMENT — ANXIETY QUESTIONNAIRES
GAD7 TOTAL SCORE: 3
6. BECOMING EASILY ANNOYED OR IRRITABLE: NOT AT ALL
5. BEING SO RESTLESS THAT IT IS HARD TO SIT STILL: SEVERAL DAYS
7. FEELING AFRAID AS IF SOMETHING AWFUL MIGHT HAPPEN: NOT AT ALL
4. TROUBLE RELAXING: SEVERAL DAYS
3. WORRYING TOO MUCH ABOUT DIFFERENT THINGS: NOT AT ALL
1. FEELING NERVOUS, ANXIOUS, OR ON EDGE: SEVERAL DAYS
IF YOU CHECKED OFF ANY PROBLEMS ON THIS QUESTIONNAIRE, HOW DIFFICULT HAVE THESE PROBLEMS MADE IT FOR YOU TO DO YOUR WORK, TAKE CARE OF THINGS AT HOME, OR GET ALONG WITH OTHER PEOPLE: SOMEWHAT DIFFICULT
2. NOT BEING ABLE TO STOP OR CONTROL WORRYING: NOT AT ALL

## 2024-05-20 NOTE — PROGRESS NOTES
2024  Estrella Whiting (: 1955)  68 y.o.    ASSESSMENT and PLAN:  Estrella was seen today for follow-up.    Diagnoses and all orders for this visit:    Hypomagnesemia  -     Magnesium; Future  -     Comprehensive Metabolic Panel; Future    Nausea  -     promethazine (PHENERGAN) 12.5 MG tablet; Take 1 tablet by mouth every 8 hours as needed for Nausea    Type 2 diabetes mellitus without complication, without long-term current use of insulin (HCC)  -     Comprehensive Metabolic Panel; Future  -     Semaglutide-Weight Management (WEGOVY) 0.25 MG/0.5ML SOAJ SC injection; Inject 0.25 mg into the skin every 7 days  - patient denies history of medullary thyroid cancer as well as neuroendocrine tumors. Side effects discussed including abdominal pain, nausea, vomiting, and decreased appetite. Risks including gall stones and pancreatitis were also discussed. Patient understands that lifestyle modification including healthy diet and consistent exercise is necessary for long term weight loss success in spite of weight loss obtained by taking this medication.   - patient will follow up in 6 weeks for weight check and labs.         Return in about 6 weeks (around 2024) for Diabetes Mellitus.    HPI    New concern for depression.       DM: last A1c 6.7, currently on metformin 500 mg daily. Tolerating well without side effect. Monitoring bg regularly. On statin, no ace/arb. Concerned about her weight is interested in glp1 for dm as well as weight loss assistance.     CAD/HFpEF/HTN: R/LHC performed 2023 showed mild cad, elevated filling pressures. Current regimen includes lasix, spironolactone, asa, statin, dilt. Reports overall improvement since adding spironolactone. Compliant with medication.      Fibromyalgia- stable on cymbalta.      Anxiety and depression: Stable on cymabalta. Feels overwhelmed 2/2 to inability to lose weight- \" I feel like a stuffed pig, I don't want anyone to see me.\"      Follows with Dr. DILL

## 2024-05-20 NOTE — PATIENT INSTRUCTIONS
[facility] Food Resources*  (Call 211 if need more resources.)          Feeding [employer]ochitl   What they offer: Feeding [employer] is a nationwide network of food chavarria, food pantries, and meal programs.  Website: https://www.feedingamerica.org/find-your-local-foodbank      National Hunger Hotline  What they offer: The hotline is a resource for individuals and families seeking information on how and where to obtain food.   Website:  https://www.hungerfreeGrabCADa.org/en-us/[facility]-national-hunger-hotline    Hunger Hotline Phone Number: 5-014-6-KEELY (1-968.993.8518)  Hours of Operation: Monday - Friday 7am to 10pm   The Hunger Hotline also operates a texting service. Our number is 043-259-3207. Please note that these are automated texts and we do not reply or monitor texts.   [employer] [place]  What they offer: $1 for $1 matching for families receiving SNAP/food stamps when spent on “healthy” food.  The match money can be used to purchase fruits and vegetables so adds more healthy food choices for SNAP beneficiaries.   Contact: https://kooaba/locations/   SNAP (formerly [employer])   What they offer: SNAP is used like cash to buy eligible food items from authorized retailers.  Apply for benefits online: https://[employer].[place].gov/ofam/foodstamps.stm     Apply for benefits by phone or in-person by visiting your local Job and Family Services. Locate your [facility]’s Job and family services by searching the directory at https://[employer].[place].[facility]_Directory.stm           Meals on Wheels   What they offer: Meals on Wheels is a program that delivers meals to individuals who have no reliable means for maintaining a healthy diet.       To Apply:   Ages 18-59:  Apply online: https://www.[employer].org/  Apply by phone: [phone]) [phone]    Ages 60+:   [place] on Aging: [phone]      [employer]  What they offer: Serves neighbors in [facility] through its network of Conferences

## 2024-05-21 NOTE — TELEPHONE ENCOUNTER
Submitted PA for Wegovy 0.25MG/0.5ML auto-injectors  Via CMM Key: [id number]  STATUS: PENDING.    Follow up done daily; if no decision with in three days we will refax.  If another three days goes by with no decision will call the insurance for status.

## 2024-05-22 NOTE — TELEPHONE ENCOUNTER
Pt informed.   Patient ask is there something else that needs to be sent in for her insurance to cover it.   Patient has called her insurance to ask if they will cover it, she said they stated yes.

## 2024-05-22 NOTE — TELEPHONE ENCOUNTER
Patient called our office back with the insurance company on [address].   I spoke to a representative name Josefina and she stated that documentation needs to be submit for the patient current heart condition.  Josefina did not have the fax number available to send the documentation to.  She said to please call the   [employer] Provider Service Line at [phone]

## 2024-05-22 NOTE — TELEPHONE ENCOUNTER
Submitted appeal for Wegovy via fax to [facility] appeals along with chart notes. Status PENDING.    15

## 2024-05-30 ENCOUNTER — CARE COORDINATION (OUTPATIENT)
Dept: PRIMARY CARE CLINIC | Age: 69
End: 2024-05-30

## 2024-05-30 ENCOUNTER — CARE COORDINATION (OUTPATIENT)
Dept: CARE COORDINATION | Age: 69
End: 2024-05-30

## 2024-05-30 DIAGNOSIS — I50.32 CHRONIC DIASTOLIC (CONGESTIVE) HEART FAILURE (HCC): Primary | ICD-10-CM

## 2024-05-30 NOTE — CARE COORDINATION
RPM Kit Return    Estrella Whiting  5/30/24    Care Coordination  placed call to patient to arrange RPM kit  through [employer].     CCSS spoke to Patient reviewed with Patient how to pack equipment in original packing. Patient aware UPS will  equipment in 2-4 days.   All questions and concerns answered.

## 2024-05-30 NOTE — CARE COORDINATION
ACM completed follow up call with patient who said she is doing well at home. Patient said she no longer wants to participate in RPM or care management at this time. ACM will resolve episodes at this time.

## 2024-05-30 NOTE — PROGRESS NOTES
Remote Patient Order Discontinued    Received request from Jenn Avery, RN   to discontinue order for remote patient monitoring of CHF and order completed.

## 2024-06-05 ENCOUNTER — TELEPHONE (OUTPATIENT)
Dept: FAMILY MEDICINE CLINIC | Age: 69
End: 2024-06-05

## 2024-06-05 DIAGNOSIS — G89.29 CHRONIC PELVIC PAIN IN FEMALE: Primary | ICD-10-CM

## 2024-06-05 DIAGNOSIS — R10.2 CHRONIC PELVIC PAIN IN FEMALE: Primary | ICD-10-CM

## 2024-06-05 DIAGNOSIS — F33.1 MODERATE EPISODE OF RECURRENT MAJOR DEPRESSIVE DISORDER (HCC): ICD-10-CM

## 2024-06-05 DIAGNOSIS — M79.7 FIBROMYALGIA: ICD-10-CM

## 2024-06-05 RX ORDER — DULOXETIN HYDROCHLORIDE 60 MG/1
CAPSULE, DELAYED RELEASE ORAL
Qty: 90 CAPSULE | Refills: 0 | Status: SHIPPED | OUTPATIENT
Start: 2024-06-05

## 2024-06-05 NOTE — TELEPHONE ENCOUNTER
Referral placed for urogynecology.     Referral Details       Referred By   Referred To    Yi Weinstein PA   [address]   [address]   [facility] [address]   Phone: [phone]   Fax: [phone]    Diagnoses: Chronic pelvic pain in female   Order: Nikki Coleman Cnp, Urogynecology, [facility]   Reason: Specialty Services Required    [facility]ro[address]   [address]   [facility] [address]   Phone: [phone]   Fax: [phone]

## 2024-06-05 NOTE — TELEPHONE ENCOUNTER
Refill Request     CONFIRM preferred pharmacy with the patient.    If Mail Order Rx - Pend for 90 day refill.      Last Seen: Last Seen Department: 5/20/2024  Last Seen by PCP: 5/20/2024    Last Written: 11/30/23 90 with 1 refill     If no future appointment scheduled:  Review the last OV with PCP and review information for follow-up visit,  Route STAFF MESSAGE with patient name to the  Pool for scheduling with the following information:            -  Timing of next visit           -  Visit type ie Physical, OV, etc           -  Diagnoses/Reason ie. COPD, HTN - Do not use MEDICATION, Follow-up or CHECK UP - Give reason for visit      Next Appointment:   Future Appointments   Date Time Provider Department Center   7/23/2024  1:15 PM John Bocanegra MD [facility]   10/28/2024 11:00 AM [facility] [place] [facility]   11/6/2024 11:00 AM Caesar Blood MD CLE PUL [facility]       Message sent to  to schedule appt with patient?  YES eturn in about 6 weeks (around 7/1/2024) for Diabetes Mellitus.       Requested Prescriptions     Pending Prescriptions Disp Refills    DULoxetine (CYMBALTA) 60 MG extended release capsule [Pharmacy Med Name: DULoxetine HCl 60 MG Oral Capsule Delayed Release Particles] 90 capsule 0     Sig: Take 1 capsule by mouth once daily

## 2024-06-17 ENCOUNTER — TELEPHONE (OUTPATIENT)
Dept: FAMILY MEDICINE CLINIC | Age: 69
End: 2024-06-17

## 2024-06-17 DIAGNOSIS — E66.01 CLASS 3 SEVERE OBESITY DUE TO EXCESS CALORIES WITH SERIOUS COMORBIDITY AND BODY MASS INDEX (BMI) OF 40.0 TO 44.9 IN ADULT (HCC): Primary | ICD-10-CM

## 2024-06-17 NOTE — TELEPHONE ENCOUNTER
Referral placed, please give scheduling information.     Referral Details       Referred By   Referred To    Yi Weinstein PA   [address]   [address]   [facility] [address]   Phone: [phone]   Fax: [phone]    Diagnoses: Class 3 severe obesity due to excess calories with serious comorbidity and body mass index (BMI) of 40.0 to 44.9 in adult (HCC)   Order: Dania - Maribel Bueno Md, Non-Surgical Medical Weight Management, (Virtual Visits Only)   Reason: Specialty Services Required    Maribel Bueno MD

## 2024-06-17 NOTE — TELEPHONE ENCOUNTER
Please let the patient know that unfortunately, insurance has declined the appeal for wegovy as she does not have qualifying conditions (history of heart attack stroke/ or peripheral arterial disease).   If she is interested in getting set up with our nonsurgical bariatric specialist, let me know and I can place referral for Dr. Bueno.

## 2024-06-27 ENCOUNTER — APPOINTMENT (OUTPATIENT)
Dept: CT IMAGING | Age: 69
End: 2024-06-27
Payer: MEDICARE

## 2024-06-27 ENCOUNTER — HOSPITAL ENCOUNTER (EMERGENCY)
Age: 69
Discharge: HOME | End: 2024-06-27
Attending: STUDENT IN AN ORGANIZED HEALTH CARE EDUCATION/TRAINING PROGRAM
Payer: MEDICARE

## 2024-06-27 VITALS
OXYGEN SATURATION: 93 % | RESPIRATION RATE: 16 BRPM | HEIGHT: 61 IN | BODY MASS INDEX: 41.54 KG/M2 | HEART RATE: 108 BPM | WEIGHT: 220 LBS | TEMPERATURE: 98.1 F | SYSTOLIC BLOOD PRESSURE: 155 MMHG | DIASTOLIC BLOOD PRESSURE: 72 MMHG

## 2024-06-27 DIAGNOSIS — M54.6 ACUTE BILATERAL THORACIC BACK PAIN: Primary | ICD-10-CM

## 2024-06-27 LAB
ALBUMIN SERPL-MCNC: 4.2 G/DL (ref 3.4–5)
ALBUMIN/GLOB SERPL: 1.1 {RATIO} (ref 1.1–2.2)
ALP SERPL-CCNC: 132 U/L (ref 40–129)
ALT SERPL-CCNC: 36 U/L (ref 10–40)
ANION GAP SERPL CALCULATED.3IONS-SCNC: 16 MMOL/L (ref 3–16)
AST SERPL-CCNC: 23 U/L (ref 15–37)
BASOPHILS # BLD: 0.1 K/UL (ref 0–0.2)
BASOPHILS NFR BLD: 0.6 %
BILIRUB SERPL-MCNC: 0.4 MG/DL (ref 0–1)
BILIRUB UR QL STRIP.AUTO: NEGATIVE
BUN SERPL-MCNC: 23 MG/DL (ref 7–20)
CALCIUM SERPL-MCNC: 9.9 MG/DL (ref 8.3–10.6)
CHLORIDE SERPL-SCNC: 96 MMOL/L (ref 99–110)
CLARITY UR: CLEAR
CO2 SERPL-SCNC: 25 MMOL/L (ref 21–32)
COLOR UR: YELLOW
CREAT SERPL-MCNC: 0.8 MG/DL (ref 0.6–1.2)
DEPRECATED RDW RBC AUTO: 15.1 % (ref 12.4–15.4)
EKG ATRIAL RATE: 102 BPM
EKG DIAGNOSIS: NORMAL
EKG P AXIS: 39 DEGREES
EKG P-R INTERVAL: 148 MS
EKG Q-T INTERVAL: 338 MS
EKG QRS DURATION: 76 MS
EKG QTC CALCULATION (BAZETT): 440 MS
EKG R AXIS: -14 DEGREES
EKG T AXIS: 63 DEGREES
EKG VENTRICULAR RATE: 102 BPM
EOSINOPHIL # BLD: 0.1 K/UL (ref 0–0.6)
EOSINOPHIL NFR BLD: 1.1 %
EPI CELLS #/AREA URNS HPF: ABNORMAL /HPF (ref 0–5)
GFR SERPLBLD CREATININE-BSD FMLA CKD-EPI: 79 ML/MIN/{1.73_M2}
GLUCOSE SERPL-MCNC: 163 MG/DL (ref 70–99)
GLUCOSE UR STRIP.AUTO-MCNC: NEGATIVE MG/DL
HCT VFR BLD AUTO: 42.5 % (ref 36–48)
HGB BLD-MCNC: 13.9 G/DL (ref 12–16)
HGB UR QL STRIP.AUTO: NEGATIVE
KETONES UR STRIP.AUTO-MCNC: NEGATIVE MG/DL
LEUKOCYTE ESTERASE UR QL STRIP.AUTO: NEGATIVE
LYMPHOCYTES # BLD: 1.5 K/UL (ref 1–5.1)
LYMPHOCYTES NFR BLD: 13.2 %
MCH RBC QN AUTO: 28.2 PG (ref 26–34)
MCHC RBC AUTO-ENTMCNC: 32.9 G/DL (ref 31–36)
MCV RBC AUTO: 85.8 FL (ref 80–100)
MONOCYTES # BLD: 0.6 K/UL (ref 0–1.3)
MONOCYTES NFR BLD: 5.2 %
MUCOUS THREADS #/AREA URNS LPF: ABNORMAL /LPF
NEUTROPHILS # BLD: 9.1 K/UL (ref 1.7–7.7)
NEUTROPHILS NFR BLD: 79.9 %
NITRITE UR QL STRIP.AUTO: NEGATIVE
PH UR STRIP.AUTO: 7 [PH] (ref 5–8)
PLATELET # BLD AUTO: 270 K/UL (ref 135–450)
PMV BLD AUTO: 8.1 FL (ref 5–10.5)
POTASSIUM SERPL-SCNC: 3.9 MMOL/L (ref 3.5–5.1)
PROT SERPL-MCNC: 7.9 G/DL (ref 6.4–8.2)
PROT UR STRIP.AUTO-MCNC: NEGATIVE MG/DL
RBC # BLD AUTO: 4.95 M/UL (ref 4–5.2)
RBC #/AREA URNS HPF: ABNORMAL /HPF (ref 0–4)
SODIUM SERPL-SCNC: 137 MMOL/L (ref 136–145)
SP GR UR STRIP.AUTO: 1.01 (ref 1–1.03)
TROPONIN, HIGH SENSITIVITY: 10 NG/L (ref 0–14)
TROPONIN, HIGH SENSITIVITY: 12 NG/L (ref 0–14)
UA DIPSTICK W REFLEX MICRO PNL UR: ABNORMAL
URN SPEC COLLECT METH UR: ABNORMAL
UROBILINOGEN UR STRIP-ACNC: 0.2 E.U./DL
WBC # BLD AUTO: 11.4 K/UL (ref 4–11)
WBC #/AREA URNS HPF: ABNORMAL /HPF (ref 0–5)

## 2024-06-27 PROCEDURE — 93010 ELECTROCARDIOGRAM REPORT: CPT | Performed by: INTERNAL MEDICINE

## 2024-06-27 PROCEDURE — 96374 THER/PROPH/DIAG INJ IV PUSH: CPT

## 2024-06-27 PROCEDURE — 81001 URINALYSIS AUTO W/SCOPE: CPT

## 2024-06-27 PROCEDURE — 6360000004 HC RX CONTRAST MEDICATION: Performed by: STUDENT IN AN ORGANIZED HEALTH CARE EDUCATION/TRAINING PROGRAM

## 2024-06-27 PROCEDURE — 6370000000 HC RX 637 (ALT 250 FOR IP): Performed by: EMERGENCY MEDICINE

## 2024-06-27 PROCEDURE — 85025 COMPLETE CBC W/AUTO DIFF WBC: CPT

## 2024-06-27 PROCEDURE — 80053 COMPREHEN METABOLIC PANEL: CPT

## 2024-06-27 PROCEDURE — 71275 CT ANGIOGRAPHY CHEST: CPT

## 2024-06-27 PROCEDURE — 36415 COLL VENOUS BLD VENIPUNCTURE: CPT

## 2024-06-27 PROCEDURE — 99285 EMERGENCY DEPT VISIT HI MDM: CPT

## 2024-06-27 PROCEDURE — 6360000002 HC RX W HCPCS: Performed by: STUDENT IN AN ORGANIZED HEALTH CARE EDUCATION/TRAINING PROGRAM

## 2024-06-27 PROCEDURE — 93005 ELECTROCARDIOGRAM TRACING: CPT | Performed by: STUDENT IN AN ORGANIZED HEALTH CARE EDUCATION/TRAINING PROGRAM

## 2024-06-27 PROCEDURE — 84484 ASSAY OF TROPONIN QUANT: CPT

## 2024-06-27 RX ORDER — METHOCARBAMOL 500 MG/1
1000 TABLET, FILM COATED ORAL ONCE
Status: COMPLETED | OUTPATIENT
Start: 2024-06-27 | End: 2024-06-27

## 2024-06-27 RX ORDER — LIDOCAINE 4 G/G
1 PATCH TOPICAL DAILY PRN
Qty: 20 PATCH | Refills: 0 | Status: SHIPPED | OUTPATIENT
Start: 2024-06-27 | End: 2024-07-27

## 2024-06-27 RX ORDER — MORPHINE SULFATE 2 MG/ML
2 INJECTION, SOLUTION INTRAMUSCULAR; INTRAVENOUS ONCE
Status: COMPLETED | OUTPATIENT
Start: 2024-06-27 | End: 2024-06-27

## 2024-06-27 RX ORDER — METHOCARBAMOL 500 MG/1
500 TABLET, FILM COATED ORAL 4 TIMES DAILY PRN
Qty: 20 TABLET | Refills: 0 | Status: SHIPPED | OUTPATIENT
Start: 2024-06-27 | End: 2024-07-02

## 2024-06-27 RX ORDER — OXYCODONE HYDROCHLORIDE AND ACETAMINOPHEN 5; 325 MG/1; MG/1
2 TABLET ORAL ONCE
Status: COMPLETED | OUTPATIENT
Start: 2024-06-27 | End: 2024-06-27

## 2024-06-27 RX ADMIN — METHOCARBAMOL TABLETS 1000 MG: 500 TABLET, COATED ORAL at 09:10

## 2024-06-27 RX ADMIN — MORPHINE SULFATE 2 MG: 2 INJECTION, SOLUTION INTRAMUSCULAR; INTRAVENOUS at 05:52

## 2024-06-27 RX ADMIN — OXYCODONE HYDROCHLORIDE AND ACETAMINOPHEN 2 TABLET: 5; 325 TABLET ORAL at 09:10

## 2024-06-27 RX ADMIN — IOPAMIDOL 85 ML: 755 INJECTION, SOLUTION INTRAVENOUS at 06:31

## 2024-06-27 SDOH — ECONOMIC STABILITY: INCOME INSECURITY: IN THE LAST 12 MONTHS, WAS THERE A TIME WHEN YOU WERE NOT ABLE TO PAY THE MORTGAGE OR RENT ON TIME?: NO

## 2024-06-27 SDOH — ECONOMIC STABILITY: HOUSING INSECURITY: IN THE LAST 12 MONTHS, HOW MANY PLACES HAVE YOU LIVED?: 1

## 2024-06-27 ASSESSMENT — PAIN - FUNCTIONAL ASSESSMENT: PAIN_FUNCTIONAL_ASSESSMENT: 0-10

## 2024-06-27 ASSESSMENT — PAIN SCALES - GENERAL
PAINLEVEL_OUTOF10: 0
PAINLEVEL_OUTOF10: 10

## 2024-06-27 ASSESSMENT — PAIN DESCRIPTION - LOCATION: LOCATION: BACK

## 2024-06-27 NOTE — ED PROVIDER NOTES
is seen in the spine..  Tiny periumbilical hernia containing fat is seen CTA PELVIS: Aorta/Iliacs: Atherosclerotic changes seen in the iliac arteries.  No focal bladder wall thickening.  There is mild pelvic floor laxity suggested. Uterus is surgically absent. Other: Spurring is seen in the hips     Chest: No dissection identified. Abdomen and pelvis Questionable fat stranding surrounding the gallbladder, either artifactual from respiratory motion or due to underlying cholecystitis. No aortic aneurysm.  No dissection noted Mild fat stranding surrounds the rectosigmoid colon either due to mild colitis or diverticulitis. Fatty liver.  Hypodense nodule in the region of the caudate lobe appears similar dating back to 2022.  This was described on prior outside CT scan March 2016 RECOMMENDATIONS: Left Bosniak I benign renal cyst measuring 1.2 cm. No follow-up imaging is recommended. JACR 2018 Feb; 264-273, Management of the Incidental Renal Mass on CT, [employer] 2021; 814-848, Bosniak Classification of Cystic Renal Masses, Version 2019.          MDM:  Patient presenting for evaluation of thoracic back pain.  Possible musculoskeletal cause although there is no reproducible tenderness.  Certainly concern for possible aortic aneurysm versus dissection based on presentation, symptoms and history.  Therefore at time of signout, CTA of the chest abdomen pelvis are pending to evaluate for any aortic pathology, as well as evaluate for further other possible etiologies.  CTA showed no aortic aneurysm or evidence of dissection.  There were multiple incidental findings including questionable fat stranding around the gallbladder but no right upper quadrant pain or other signs or symptoms reported by patient to correlate with any possible cholecystitis so I feel that this is likely more artifactual.  There were other findings including renal cyst that is not recommended any need for follow-up imaging, as well as a liver nodule 
date: 6/1[birth year]1989     Quit date: 6/1/2021     Years since quitting: 3.0    Smokeless tobacco: Never   Vaping Use    Vaping Use: Former   Substance and Sexual Activity    Alcohol use: No    Drug use: No    Sexual activity: Never   Other Topics Concern    Not on file   Social History Narrative    Not on file     Social Determinants of Health     Financial Resource Strain: Low Risk  (5/20/2024)    Overall Financial Resource Strain (CARDIA)     Difficulty of Paying Living Expenses: Not hard at all   Food Insecurity: Food Insecurity Present (5/20/2024)    Hunger Vital Sign     Worried About Running Out of Food in the Last Year: Often true     Ran Out of Food in the Last Year: Never true   Transportation Needs: No Transportation Needs (5/20/2024)    PRAPARE - Transportation     Lack of Transportation (Medical): No     Lack of Transportation (Non-Medical): No   Physical Activity: Insufficiently Active (11/28/2022)    Exercise Vital Sign     Days of Exercise per Week: 7 days     Minutes of Exercise per Session: 20 min   Stress: Not on file   Social Connections: Not on file   Intimate Partner Violence: Not on file   Housing Stability: Unknown (5/20/2024)    Housing Stability Vital Sign     Unable to Pay for Housing in the Last Year: No     Number of Places Lived in the Last Year: Not on file     Unstable Housing in the Last Year: No         Differential diagnosis includes but is not limited to:  Musculoskeletal pain, spinal stenosis, sciatica, disc herniation, degenerative joint disease, kidney stone, pyelonephritis, AAA, aortic dissection     WORKUP INTERPRETATION:  ED Course as of 06/27/24 0703   Thu Jun 27, 2024   0622 Troponin within normal limits [ER]   0622 Hypochloremia to 96.  No other electrolyte abnormalities or evidence of kidney dysfunction [ER]   0622 Liver function testing shows alkaline phosphatase of 132, otherwise unremarkable [ER]   0622 Mild leukocytosis to 11.4.  No anemia or thrombocytopenia [ER]   0632 The

## 2024-06-28 ENCOUNTER — TELEPHONE (OUTPATIENT)
Dept: FAMILY MEDICINE CLINIC | Age: 69
End: 2024-06-28

## 2024-06-28 NOTE — TELEPHONE ENCOUNTER
ED Follow Up Call/ Schedule appt   [facility]: [facility]   Reason: back pain  Date: 6/27/24    Appt scheduled: 7/1/24      Comments: Patient states that her pain is radiating to her ribs, would like to have an appt Monday.     Future Appointments   Date Time Provider Department Center   7/1/2024  2:30 PM Yi Weinstein PA [facility] [place] [facility]   7/12/2024  1:00 PM Yi Weinstein PA [facility]  [place] [facility]   7/23/2024  1:15 PM John Bocanegra MD [facility]   7/29/2024  9:30 AM Yi Weinstein PA [facility]  [place] [facility]   8/16/2024 12:30 PM SCHEDULE, HEALTHY WEIGHT HEALTHY [facility]   8/21/2024  4:00 PM Maribel Bueno MD [facility]   10/28/2024 11:00 AM [facility]   11/6/2024 11:00 AM [facility]

## 2024-07-01 ENCOUNTER — OFFICE VISIT (OUTPATIENT)
Dept: FAMILY MEDICINE CLINIC | Age: 69
End: 2024-07-01
Payer: MEDICARE

## 2024-07-01 VITALS
SYSTOLIC BLOOD PRESSURE: 126 MMHG | BODY MASS INDEX: 41.84 KG/M2 | WEIGHT: 221.6 LBS | HEIGHT: 61 IN | TEMPERATURE: 97 F | DIASTOLIC BLOOD PRESSURE: 62 MMHG | OXYGEN SATURATION: 93 % | HEART RATE: 89 BPM

## 2024-07-01 DIAGNOSIS — E11.59 TYPE 2 DIABETES MELLITUS WITH OTHER CIRCULATORY COMPLICATION, WITHOUT LONG-TERM CURRENT USE OF INSULIN (HCC): Primary | ICD-10-CM

## 2024-07-01 DIAGNOSIS — E66.01 CLASS 3 SEVERE OBESITY DUE TO EXCESS CALORIES WITH SERIOUS COMORBIDITY AND BODY MASS INDEX (BMI) OF 40.0 TO 44.9 IN ADULT (HCC): ICD-10-CM

## 2024-07-01 DIAGNOSIS — E66.9 TYPE 2 DIABETES MELLITUS WITH OBESITY (HCC): ICD-10-CM

## 2024-07-01 DIAGNOSIS — S29.012S STRAIN OF MUSCLE AND TENDON OF BACK WALL OF THORAX, SEQUELA: ICD-10-CM

## 2024-07-01 DIAGNOSIS — E11.69 TYPE 2 DIABETES MELLITUS WITH OBESITY (HCC): ICD-10-CM

## 2024-07-01 PROCEDURE — G8427 DOCREV CUR MEDS BY ELIG CLIN: HCPCS | Performed by: PHYSICIAN ASSISTANT

## 2024-07-01 PROCEDURE — 99214 OFFICE O/P EST MOD 30 MIN: CPT | Performed by: PHYSICIAN ASSISTANT

## 2024-07-01 PROCEDURE — G8399 PT W/DXA RESULTS DOCUMENT: HCPCS | Performed by: PHYSICIAN ASSISTANT

## 2024-07-01 PROCEDURE — 1036F TOBACCO NON-USER: CPT | Performed by: PHYSICIAN ASSISTANT

## 2024-07-01 PROCEDURE — 1090F PRES/ABSN URINE INCON ASSESS: CPT | Performed by: PHYSICIAN ASSISTANT

## 2024-07-01 PROCEDURE — 2022F DILAT RTA XM EVC RTNOPTHY: CPT | Performed by: PHYSICIAN ASSISTANT

## 2024-07-01 PROCEDURE — 1123F ACP DISCUSS/DSCN MKR DOCD: CPT | Performed by: PHYSICIAN ASSISTANT

## 2024-07-01 PROCEDURE — G8417 CALC BMI ABV UP PARAM F/U: HCPCS | Performed by: PHYSICIAN ASSISTANT

## 2024-07-01 PROCEDURE — 3044F HG A1C LEVEL LT 7.0%: CPT | Performed by: PHYSICIAN ASSISTANT

## 2024-07-01 PROCEDURE — 3074F SYST BP LT 130 MM HG: CPT | Performed by: PHYSICIAN ASSISTANT

## 2024-07-01 PROCEDURE — 3078F DIAST BP <80 MM HG: CPT | Performed by: PHYSICIAN ASSISTANT

## 2024-07-01 PROCEDURE — 3017F COLORECTAL CA SCREEN DOC REV: CPT | Performed by: PHYSICIAN ASSISTANT

## 2024-07-01 RX ORDER — SEMAGLUTIDE 0.68 MG/ML
0.25 INJECTION, SOLUTION SUBCUTANEOUS WEEKLY
Qty: 3 ML | Refills: 1 | Status: SHIPPED | OUTPATIENT
Start: 2024-07-01

## 2024-07-01 SDOH — ECONOMIC STABILITY: FOOD INSECURITY: WITHIN THE PAST 12 MONTHS, YOU WORRIED THAT YOUR FOOD WOULD RUN OUT BEFORE YOU GOT MONEY TO BUY MORE.: NEVER TRUE

## 2024-07-01 SDOH — ECONOMIC STABILITY: FOOD INSECURITY: WITHIN THE PAST 12 MONTHS, THE FOOD YOU BOUGHT JUST DIDN'T LAST AND YOU DIDN'T HAVE MONEY TO GET MORE.: NEVER TRUE

## 2024-07-01 SDOH — ECONOMIC STABILITY: INCOME INSECURITY: HOW HARD IS IT FOR YOU TO PAY FOR THE VERY BASICS LIKE FOOD, HOUSING, MEDICAL CARE, AND HEATING?: NOT HARD AT ALL

## 2024-07-01 ASSESSMENT — ENCOUNTER SYMPTOMS
RHINORRHEA: 0
CONSTIPATION: 0
SORE THROAT: 0
DIARRHEA: 0
NAUSEA: 0
VOMITING: 0
COUGH: 0
ABDOMINAL PAIN: 0
SHORTNESS OF BREATH: 0

## 2024-07-01 ASSESSMENT — PATIENT HEALTH QUESTIONNAIRE - PHQ9: DEPRESSION UNABLE TO ASSESS: PT REFUSES

## 2024-07-01 NOTE — TELEPHONE ENCOUNTER
Refill Request     CONFIRM preferred pharmacy with the patient.    If Mail Order Rx - Pend for 90 day refill.      Last Seen: Last Seen Department: 5/20/2024  Last Seen by PCP: 5/20/2024    Last Written: 1/8/24 90 with 1 refill     If no future appointment scheduled:  Review the last OV with PCP and review information for follow-up visit,  Route STAFF MESSAGE with patient name to the  Pool for scheduling with the following information:            -  Timing of next visit           -  Visit type ie Physical, OV, etc           -  Diagnoses/Reason ie. COPD, HTN - Do not use MEDICATION, Follow-up or CHECK UP - Give reason for visit      Next Appointment:   Future Appointments   Date Time Provider Department Center   7/1/2024  2:30 PM Yi Weinstein PA [facility]  [place] [facility]   7/12/2024  1:00 PM Yi Weinstein PA [facility]  [place] [facility]   7/23/2024  1:15 PM John Bocanegra MD [facility]   7/29/2024  9:30 AM Yi Weinstein PA [facility]  [place] [facility]   8/16/2024 12:30 PM SCHEDULE, HEALTHY WEIGHT HEALTHY [facility]   8/21/2024  4:00 PM Maribel Bueno MD HE[facility]Y [facility]   10/28/2024 11:00 AM MHC CT MAIN [facility] [place] [facility]   11/6/2024 11:00 [facility]       Message sent to  to schedule appt with patient?  NO      Requested Prescriptions     Pending Prescriptions Disp Refills    metFORMIN (GLUCOPHAGE) 500 MG tablet [Pharmacy Med Name: metFORMIN HCl 500 MG Oral Tablet] 90 tablet 0     Sig: Take 1 tablet by mouth once daily with breakfast

## 2024-07-01 NOTE — PROGRESS NOTES
2024  Estrella Whiting (: 1955)  68 y.o.    ASSESSMENT and PLAN:  Estrella was seen today for follow-up.    Diagnoses and all orders for this visit:    Type 2 diabetes mellitus with other circulatory complication, without long-term current use of insulin ([facility])  Class 3 severe obesity due to excess calories with serious comorbidity and body mass index (BMI) of 40.0 to 44.9 in adult (HCC)  -     Semaglutide,0.25 or 0.5MG/DOS, (OZEMPIC, 0.25 OR 0.5 MG/DOSE,) 2 MG/3ML SOPN; Inject 0.25 mg into the skin once a week  - given A1c and cardiac history, great candidate for glp1  - - patient denies history of medullary thyroid cancer as well as neuroendocrine tumors. Side effects discussed including abdominal pain, nausea, vomiting, and decreased appetite. Risks including gall stones and pancreatitis were also discussed. Patient understands that lifestyle modification including healthy diet and consistent exercise is necessary for long term weight loss success in spite of weight loss obtained by taking this medication.   - patient will follow up in 6 weeks for weight check and labs.     Strain of muscle and tendon of back wall of thorax, sequela  - ER notes, imaging, labs reviewed  - improving with muscle relaxer. Continue  - discussed medrol dose pack, given dm will hold off for now. If re-injury occurs, would consider.     No follow-ups on file.    HPI    Patient presents for ED follow up.   Seen in ED for bilateral thoracic back pain.   Patient reports that she tried to empty a box by lifting a large cot.   CBC with mild leukocytosis of 11.4; cmp wnl aside from elvated alk phos 132 and elevated glu 163  CTA chest/abd/pelvis  IMPRESSION:  Chest:    No dissection identified.    Abdomen and pelvis    Questionable fat stranding surrounding the gallbladder, either artifactual  from respiratory motion or due to underlying cholecystitis.    No aortic aneurysm. No dissection noted    Mild fat stranding surrounds the

## 2024-07-17 RX ORDER — FUROSEMIDE 40 MG/1
40 TABLET ORAL DAILY
Qty: 90 TABLET | Refills: 2 | Status: SHIPPED | OUTPATIENT
Start: 2024-07-17

## 2024-07-17 NOTE — TELEPHONE ENCOUNTER
Last ov: 4/23/24 [facility]  Upcoming ov: n/a    Labs- BMP 6/27/24    Will call pt at a more suitable time to schedule OV

## 2024-07-23 ENCOUNTER — OFFICE VISIT (OUTPATIENT)
Dept: UROGYNECOLOGY | Age: 69
End: 2024-07-23
Payer: MEDICARE

## 2024-07-23 VITALS
OXYGEN SATURATION: 99 % | WEIGHT: 206 LBS | SYSTOLIC BLOOD PRESSURE: 133 MMHG | HEART RATE: 102 BPM | BODY MASS INDEX: 38.94 KG/M2 | RESPIRATION RATE: 16 BRPM | TEMPERATURE: 97.7 F | DIASTOLIC BLOOD PRESSURE: 82 MMHG

## 2024-07-23 DIAGNOSIS — R39.15 URINARY URGENCY: Primary | ICD-10-CM

## 2024-07-23 DIAGNOSIS — N81.6 RECTOCELE: ICD-10-CM

## 2024-07-23 DIAGNOSIS — N81.9 VAGINAL VAULT PROLAPSE: ICD-10-CM

## 2024-07-23 DIAGNOSIS — R35.0 URINARY FREQUENCY: ICD-10-CM

## 2024-07-23 DIAGNOSIS — L90.0 LICHEN SCLEROSUS: ICD-10-CM

## 2024-07-23 LAB
BILIRUBIN, POC: NORMAL
BLOOD URINE, POC: NORMAL
CLARITY, POC: CLEAR
COLOR, POC: YELLOW
EMPTY COUGH STRESS TEST: NORMAL
FIRST SENSATION: 80 CC
FULL COUGH STRESS TEST: NORMAL
GLUCOSE URINE, POC: NORMAL
KETONES, POC: NORMAL
LEUKOCYTE EST, POC: NORMAL
MAX SENSATION: 170 CC
NITRATE, URINE POC: NORMAL
NITRITE, POC: NORMAL
PH, POC: 6.5
POST VOID RESIDUAL (PVR): 40 ML
PROTEIN, POC: NORMAL
RBC URINE, POC: NORMAL
SECOND SENSATION: 130 CC
SPASM: NORMAL
SPECIFIC GRAVITY, POC: 1.01
UROBILINOGEN, POC: NORMAL
WBC URINE, POC: NORMAL

## 2024-07-23 PROCEDURE — 3075F SYST BP GE 130 - 139MM HG: CPT | Performed by: OBSTETRICS & GYNECOLOGY

## 2024-07-23 PROCEDURE — 81002 URINALYSIS NONAUTO W/O SCOPE: CPT | Performed by: OBSTETRICS & GYNECOLOGY

## 2024-07-23 PROCEDURE — 51725 SIMPLE CYSTOMETROGRAM: CPT | Performed by: OBSTETRICS & GYNECOLOGY

## 2024-07-23 PROCEDURE — G8417 CALC BMI ABV UP PARAM F/U: HCPCS | Performed by: OBSTETRICS & GYNECOLOGY

## 2024-07-23 PROCEDURE — G8427 DOCREV CUR MEDS BY ELIG CLIN: HCPCS | Performed by: OBSTETRICS & GYNECOLOGY

## 2024-07-23 PROCEDURE — 1090F PRES/ABSN URINE INCON ASSESS: CPT | Performed by: OBSTETRICS & GYNECOLOGY

## 2024-07-23 PROCEDURE — 99204 OFFICE O/P NEW MOD 45 MIN: CPT | Performed by: OBSTETRICS & GYNECOLOGY

## 2024-07-23 PROCEDURE — 3079F DIAST BP 80-89 MM HG: CPT | Performed by: OBSTETRICS & GYNECOLOGY

## 2024-07-23 RX ORDER — FUROSEMIDE 40 MG/1
40 TABLET ORAL DAILY
Qty: 90 TABLET | Refills: 2 | Status: SHIPPED | OUTPATIENT
Start: 2024-07-23

## 2024-07-23 ASSESSMENT — ENCOUNTER SYMPTOMS
VOICE CHANGE: 1
BACK PAIN: 1
APNEA: 1
SHORTNESS OF BREATH: 1

## 2024-07-23 NOTE — TELEPHONE ENCOUNTER
LOV  04/23/2024  UPCOMING  NONE  BMP  06/27/2024    At her last visit the furosemide was increased to 2 tablets daily.  But it does say discontinue on 07/17/2024.  Should she now be on 1 pill per day?        Medication Refill    Medication needing refilled: furosemide (LASIX) 40 MG tablet [[id number]]        Dosage of the medication: 40 mg     How are you taking this medication (QD, BID, TID, QID, PRN):   Sig: Take 1 tablet by mouth once daily               30 or 90 day supply called in: 90     When will you run out of your medication:    Which Pharmacy are we sending the medication to?:    [facility] [address] [place] [address] [address] [place] [address] - P [phone] - F [phone]  [address] [place] [address]  Phone: [phone]  Fax: [phone]         ** Pt states pharmacy is stating rx refill is to soon but stated she keeps running out of medication sooner due to taking 2 per day per smm**

## 2024-07-23 NOTE — TELEPHONE ENCOUNTER
Medication Refill    Medication needing refilled: furosemide (LASIX) 40 MG tablet [[id number]]        Dosage of the medication: 40 mg     How are you taking this medication (QD, BID, TID, QID, PRN):   Sig: Take 1 tablet by mouth once daily               30 or 90 day supply called in: 90     When will you run out of your medication:    Which Pharmacy are we sending the medication to?:    [facility] [address] [place] [address] - P [phone] - F [phone]  [address] [place] [address]  Phone: [phone]  Fax: [phone]         ** Pt states pharmacy is stating rx refill is to soon but stated she keeps running out of medication sooner due to taking 2 per day per smm**

## 2024-07-23 NOTE — PROGRESS NOTES
arthralgias, back pain, neck pain and neck stiffness.   All other systems reviewed and are negative.          Objective:     Vital Signs  Vitals:    07/23/24 1318   BP: 133/82   Pulse: (!) 102   Resp: 16   Temp: 97.7 °F (36.5 °C)   SpO2: 99%   Weight: 93.4 kg (206 lb)     Physical Exam  Physical Exam  HENT:      Head: Normocephalic and atraumatic.   Eyes:      Conjunctiva/sclera: Conjunctivae normal.   Pulmonary:      Effort: Pulmonary effort is normal.   Abdominal:      Palpations: Abdomen is soft.   Genitourinary:     Comments: Evidence of old retropubic procedure, rectocele, vaginal vault prolapse, evidence of probable lichen sclerosis around the opening of the vagina  Musculoskeletal:      Cervical back: Normal range of motion and neck supple.   Skin:     General: Skin is warm and dry.   Neurological:      Mental Status: She is alert and oriented to person, place, and time.             Office Fill Study/Urine Dip:     Using sterile technique a manometry catheter was placed. Patient's bladder was filled with sterile water by gravity. Capacity and storage volumes were measured. Spasms assessed. Catheter was removed. Stress urinary incontinence was assessed.    Results for POC orders placed in visit on 07/23/24   POCT Urinalysis no Micro   Result Value Ref Range    Color, UA yellow     Clarity, UA clear     Glucose, UA POC neg     Bilirubin, UA neg     Ketones, UA neg     Spec Grav, UA 1.015     Blood, UA POC neg     pH, UA 6.5     Protein, UA POC neg     Urobilinogen, UA neg     Leukocytes, UA neg     Nitrite, UA neg        Cystometrogram   WBC Urine, POC   Date Value Ref Range Status   07/23/2024 neg  Final     RBC, UA   Date Value Ref Range Status   06/27/2024 0-2 0 - 4 /HPF Final     RBC Urine, POC   Date Value Ref Range Status   07/23/2024 neg  Final     Nitrate, UA POC   Date Value Ref Range Status   07/23/2024 neg  Final     post void residual   Date Value Ref Range Status   07/23/2024 40 ml Final     FIRST

## 2024-07-25 DIAGNOSIS — E11.59 TYPE 2 DIABETES MELLITUS WITH OTHER CIRCULATORY COMPLICATION, WITHOUT LONG-TERM CURRENT USE OF INSULIN (HCC): ICD-10-CM

## 2024-07-25 DIAGNOSIS — E66.01 CLASS 3 SEVERE OBESITY DUE TO EXCESS CALORIES WITH SERIOUS COMORBIDITY AND BODY MASS INDEX (BMI) OF 40.0 TO 44.9 IN ADULT (HCC): ICD-10-CM

## 2024-07-25 RX ORDER — SEMAGLUTIDE 0.68 MG/ML
0.25 INJECTION, SOLUTION SUBCUTANEOUS WEEKLY
Qty: 3 ML | Refills: 1 | Status: SHIPPED | OUTPATIENT
Start: 2024-07-25

## 2024-07-29 ENCOUNTER — OFFICE VISIT (OUTPATIENT)
Dept: FAMILY MEDICINE CLINIC | Age: 69
End: 2024-07-29
Payer: MEDICARE

## 2024-07-29 VITALS
DIASTOLIC BLOOD PRESSURE: 68 MMHG | WEIGHT: 203.4 LBS | SYSTOLIC BLOOD PRESSURE: 130 MMHG | HEIGHT: 61 IN | OXYGEN SATURATION: 95 % | BODY MASS INDEX: 38.4 KG/M2 | HEART RATE: 98 BPM | TEMPERATURE: 97.2 F

## 2024-07-29 DIAGNOSIS — E11.59 TYPE 2 DIABETES MELLITUS WITH OTHER CIRCULATORY COMPLICATION, WITHOUT LONG-TERM CURRENT USE OF INSULIN (HCC): Primary | ICD-10-CM

## 2024-07-29 DIAGNOSIS — E66.01 CLASS 3 SEVERE OBESITY DUE TO EXCESS CALORIES WITH SERIOUS COMORBIDITY AND BODY MASS INDEX (BMI) OF 40.0 TO 44.9 IN ADULT (HCC): ICD-10-CM

## 2024-07-29 DIAGNOSIS — E11.59 TYPE 2 DIABETES MELLITUS WITH OTHER CIRCULATORY COMPLICATION, WITHOUT LONG-TERM CURRENT USE OF INSULIN (HCC): ICD-10-CM

## 2024-07-29 LAB
ALBUMIN SERPL-MCNC: 4.9 G/DL (ref 3.4–5)
ALBUMIN/GLOB SERPL: 1.7 {RATIO} (ref 1.1–2.2)
ALP SERPL-CCNC: 125 U/L (ref 40–129)
ALT SERPL-CCNC: 69 U/L (ref 10–40)
ANION GAP SERPL CALCULATED.3IONS-SCNC: 15 MMOL/L (ref 3–16)
AST SERPL-CCNC: 43 U/L (ref 15–37)
BILIRUB SERPL-MCNC: 0.5 MG/DL (ref 0–1)
BUN SERPL-MCNC: 20 MG/DL (ref 7–20)
CALCIUM SERPL-MCNC: 10.4 MG/DL (ref 8.3–10.6)
CHLORIDE SERPL-SCNC: 96 MMOL/L (ref 99–110)
CO2 SERPL-SCNC: 27 MMOL/L (ref 21–32)
CREAT SERPL-MCNC: 1 MG/DL (ref 0.6–1.2)
GFR SERPLBLD CREATININE-BSD FMLA CKD-EPI: 60 ML/MIN/{1.73_M2}
GLUCOSE SERPL-MCNC: 108 MG/DL (ref 70–99)
HBA1C MFR BLD: 6.3 %
POTASSIUM SERPL-SCNC: 4.4 MMOL/L (ref 3.5–5.1)
PROT SERPL-MCNC: 7.8 G/DL (ref 6.4–8.2)
SODIUM SERPL-SCNC: 138 MMOL/L (ref 136–145)

## 2024-07-29 PROCEDURE — 3075F SYST BP GE 130 - 139MM HG: CPT | Performed by: PHYSICIAN ASSISTANT

## 2024-07-29 PROCEDURE — G8427 DOCREV CUR MEDS BY ELIG CLIN: HCPCS | Performed by: PHYSICIAN ASSISTANT

## 2024-07-29 PROCEDURE — 3044F HG A1C LEVEL LT 7.0%: CPT | Performed by: PHYSICIAN ASSISTANT

## 2024-07-29 PROCEDURE — 3017F COLORECTAL CA SCREEN DOC REV: CPT | Performed by: PHYSICIAN ASSISTANT

## 2024-07-29 PROCEDURE — 1090F PRES/ABSN URINE INCON ASSESS: CPT | Performed by: PHYSICIAN ASSISTANT

## 2024-07-29 PROCEDURE — 3078F DIAST BP <80 MM HG: CPT | Performed by: PHYSICIAN ASSISTANT

## 2024-07-29 PROCEDURE — G8417 CALC BMI ABV UP PARAM F/U: HCPCS | Performed by: PHYSICIAN ASSISTANT

## 2024-07-29 PROCEDURE — 83036 HEMOGLOBIN GLYCOSYLATED A1C: CPT | Performed by: PHYSICIAN ASSISTANT

## 2024-07-29 PROCEDURE — G8399 PT W/DXA RESULTS DOCUMENT: HCPCS | Performed by: PHYSICIAN ASSISTANT

## 2024-07-29 PROCEDURE — 1036F TOBACCO NON-USER: CPT | Performed by: PHYSICIAN ASSISTANT

## 2024-07-29 PROCEDURE — 1123F ACP DISCUSS/DSCN MKR DOCD: CPT | Performed by: PHYSICIAN ASSISTANT

## 2024-07-29 PROCEDURE — 2022F DILAT RTA XM EVC RTNOPTHY: CPT | Performed by: PHYSICIAN ASSISTANT

## 2024-07-29 PROCEDURE — 99213 OFFICE O/P EST LOW 20 MIN: CPT | Performed by: PHYSICIAN ASSISTANT

## 2024-07-29 RX ORDER — SEMAGLUTIDE 0.68 MG/ML
0.5 INJECTION, SOLUTION SUBCUTANEOUS WEEKLY
Qty: 9 ML | Refills: 0 | Status: SHIPPED | OUTPATIENT
Start: 2024-07-29

## 2024-07-29 SDOH — ECONOMIC STABILITY: FOOD INSECURITY: WITHIN THE PAST 12 MONTHS, THE FOOD YOU BOUGHT JUST DIDN'T LAST AND YOU DIDN'T HAVE MONEY TO GET MORE.: NEVER TRUE

## 2024-07-29 SDOH — ECONOMIC STABILITY: FOOD INSECURITY: WITHIN THE PAST 12 MONTHS, YOU WORRIED THAT YOUR FOOD WOULD RUN OUT BEFORE YOU GOT MONEY TO BUY MORE.: NEVER TRUE

## 2024-07-29 SDOH — ECONOMIC STABILITY: INCOME INSECURITY: HOW HARD IS IT FOR YOU TO PAY FOR THE VERY BASICS LIKE FOOD, HOUSING, MEDICAL CARE, AND HEATING?: NOT HARD AT ALL

## 2024-07-29 ASSESSMENT — PATIENT HEALTH QUESTIONNAIRE - PHQ9
SUM OF ALL RESPONSES TO PHQ QUESTIONS 1-9: 2
2. FEELING DOWN, DEPRESSED OR HOPELESS: NOT AT ALL
7. TROUBLE CONCENTRATING ON THINGS, SUCH AS READING THE NEWSPAPER OR WATCHING TELEVISION: NOT AT ALL
SUM OF ALL RESPONSES TO PHQ9 QUESTIONS 1 & 2: 0
5. POOR APPETITE OR OVEREATING: NOT AT ALL
6. FEELING BAD ABOUT YOURSELF - OR THAT YOU ARE A FAILURE OR HAVE LET YOURSELF OR YOUR FAMILY DOWN: SEVERAL DAYS
4. FEELING TIRED OR HAVING LITTLE ENERGY: SEVERAL DAYS
10. IF YOU CHECKED OFF ANY PROBLEMS, HOW DIFFICULT HAVE THESE PROBLEMS MADE IT FOR YOU TO DO YOUR WORK, TAKE CARE OF THINGS AT HOME, OR GET ALONG WITH OTHER PEOPLE: NOT DIFFICULT AT ALL
8. MOVING OR SPEAKING SO SLOWLY THAT OTHER PEOPLE COULD HAVE NOTICED. OR THE OPPOSITE, BEING SO FIGETY OR RESTLESS THAT YOU HAVE BEEN MOVING AROUND A LOT MORE THAN USUAL: NOT AT ALL
SUM OF ALL RESPONSES TO PHQ QUESTIONS 1-9: 2
SUM OF ALL RESPONSES TO PHQ QUESTIONS 1-9: 2
3. TROUBLE FALLING OR STAYING ASLEEP: NOT AT ALL
SUM OF ALL RESPONSES TO PHQ QUESTIONS 1-9: 2
1. LITTLE INTEREST OR PLEASURE IN DOING THINGS: NOT AT ALL

## 2024-07-29 ASSESSMENT — ANXIETY QUESTIONNAIRES
IF YOU CHECKED OFF ANY PROBLEMS ON THIS QUESTIONNAIRE, HOW DIFFICULT HAVE THESE PROBLEMS MADE IT FOR YOU TO DO YOUR WORK, TAKE CARE OF THINGS AT HOME, OR GET ALONG WITH OTHER PEOPLE: NOT DIFFICULT AT ALL
1. FEELING NERVOUS, ANXIOUS, OR ON EDGE: NOT AT ALL
5. BEING SO RESTLESS THAT IT IS HARD TO SIT STILL: NOT AT ALL
6. BECOMING EASILY ANNOYED OR IRRITABLE: NOT AT ALL
2. NOT BEING ABLE TO STOP OR CONTROL WORRYING: NOT AT ALL
GAD7 TOTAL SCORE: 0
3. WORRYING TOO MUCH ABOUT DIFFERENT THINGS: NOT AT ALL
7. FEELING AFRAID AS IF SOMETHING AWFUL MIGHT HAPPEN: NOT AT ALL
4. TROUBLE RELAXING: NOT AT ALL

## 2024-07-29 ASSESSMENT — ENCOUNTER SYMPTOMS
RHINORRHEA: 0
SORE THROAT: 0
COUGH: 0
ABDOMINAL PAIN: 0
DIARRHEA: 0
CONSTIPATION: 0
VOMITING: 0
SHORTNESS OF BREATH: 0
NAUSEA: 0

## 2024-07-29 NOTE — PROGRESS NOTES
2024  Estrella Whiting (: 1955)  68 y.o.    ASSESSMENT and PLAN:  Estrella was seen today for diabetes.    Diagnoses and all orders for this visit:    Type 2 diabetes mellitus with other circulatory complication, without long-term current use of insulin ([facility])  -     POCT glycosylated hemoglobin (Hb A1C) 6.3  -     Comprehensive Metabolic Panel; Future  -     Semaglutide,0.25 or 0.5MG/DOS, (OZEMPIC, 0.25 OR 0.5 MG/DOSE,) 2 MG/3ML SOPN; Inject 0.5 mg into the skin once a week  - increase to 0.5 mg weekly ozempic.   -  patient denies history of medullary thyroid cancer as well as neuroendocrine tumors. Side effects discussed including abdominal pain, nausea, vomiting, and decreased appetite. Risks including gall stones and pancreatitis were also discussed. Patient understands that lifestyle modification including healthy diet and consistent exercise is necessary for long term weight loss success in spite of weight loss obtained by taking this medication.   - patient will follow up in 6 weeks for weight check and labs.       Class 3 severe obesity due to excess calories with serious comorbidity and body mass index (BMI) of 40.0 to 44.9 in adult ([facility])  -     Comprehensive Metabolic Panel; Future  - congratulated patient on 20 lb weight loss, encouraged to continue dietary changes. Increase ozempic dose per orders.       Return in about 3 months (around 10/29/2024) for Diabetes Mellitus.    HPI    DM follow up all other conditions addressed at last appointment.   Started on ozempic at previous appointment- continued on metformin.   Patient reports has drastically changed her diet.   Has reduced the amount of sugar and fat she is eating.   20 lb weight loss from   Patient denies side effects from the medication.         Review of Systems   Constitutional:  Negative for activity change, chills and fever.   HENT:  Negative for congestion, ear pain, rhinorrhea and sore throat.    Eyes:  Negative for visual

## 2024-07-30 ENCOUNTER — PROCEDURE VISIT (OUTPATIENT)
Dept: UROGYNECOLOGY | Age: 69
End: 2024-07-30

## 2024-07-30 VITALS
DIASTOLIC BLOOD PRESSURE: 78 MMHG | OXYGEN SATURATION: 95 % | HEART RATE: 85 BPM | SYSTOLIC BLOOD PRESSURE: 107 MMHG | TEMPERATURE: 97.8 F | RESPIRATION RATE: 16 BRPM

## 2024-07-30 DIAGNOSIS — R39.15 URINARY URGENCY: ICD-10-CM

## 2024-07-30 DIAGNOSIS — N81.9 VAGINAL VAULT PROLAPSE: ICD-10-CM

## 2024-07-30 DIAGNOSIS — R35.0 URINARY FREQUENCY: ICD-10-CM

## 2024-07-30 DIAGNOSIS — L90.0 LICHEN SCLEROSUS: ICD-10-CM

## 2024-07-30 DIAGNOSIS — N81.6 RECTOCELE: Primary | ICD-10-CM

## 2024-07-30 RX ORDER — SODIUM CHLORIDE 9 MG/ML
INJECTION, SOLUTION INTRAVENOUS PRN
OUTPATIENT
Start: 2024-07-30

## 2024-07-30 RX ORDER — SODIUM CHLORIDE, SODIUM LACTATE, POTASSIUM CHLORIDE, CALCIUM CHLORIDE 600; 310; 30; 20 MG/100ML; MG/100ML; MG/100ML; MG/100ML
INJECTION, SOLUTION INTRAVENOUS CONTINUOUS
OUTPATIENT
Start: 2024-07-30

## 2024-07-30 RX ORDER — LIDOCAINE HYDROCHLORIDE 20 MG/ML
JELLY TOPICAL ONCE
Status: COMPLETED | OUTPATIENT
Start: 2024-07-30 | End: 2024-07-30

## 2024-07-30 RX ORDER — ENOXAPARIN SODIUM 100 MG/ML
40 INJECTION SUBCUTANEOUS ONCE
OUTPATIENT
Start: 2024-07-30 | End: 2024-07-30

## 2024-07-30 RX ORDER — SODIUM CHLORIDE 0.9 % (FLUSH) 0.9 %
5-40 SYRINGE (ML) INJECTION EVERY 12 HOURS SCHEDULED
OUTPATIENT
Start: 2024-07-30

## 2024-07-30 RX ORDER — SODIUM CHLORIDE 0.9 % (FLUSH) 0.9 %
5-40 SYRINGE (ML) INJECTION PRN
OUTPATIENT
Start: 2024-07-30

## 2024-07-30 RX ADMIN — LIDOCAINE HYDROCHLORIDE: 20 JELLY TOPICAL at 10:30

## 2024-07-30 NOTE — PROGRESS NOTES
(LASIX) 40 MG tablet Take 1 tablet by mouth daily 90 tablet 2    metFORMIN (GLUCOPHAGE) 500 MG tablet Take 1 tablet by mouth once daily with breakfast 90 tablet 1    promethazine (PHENERGAN) 12.5 MG tablet Take 1 tablet by mouth every 8 hours as needed for Nausea 20 tablet 2    DULoxetine (CYMBALTA) 60 MG extended release capsule Take 1 capsule by mouth once daily 90 capsule 0    calcium carbonate (TUMS) 500 MG chewable tablet       atorvastatin (LIPITOR) 40 MG tablet Take 1 tablet by mouth daily 90 tablet 5    Cholecalciferol 50 MCG (2000 UT) TABS Take 1 tablet by mouth daily Take 1 tablet by mouth daily. Start this medication after you finish the weekly regimen 90 tablet 2    albuterol sulfate HFA (PROVENTIL HFA) 108 (90 Base) MCG/ACT inhaler Inhale 2 puffs into the lungs every 4 hours as needed for Wheezing or Shortness of Breath 18 g 6    dilTIAZem (CARDIZEM CD) 360 MG extended release capsule Take 1 capsule by mouth daily 90 capsule 3    spironolactone (ALDACTONE) 25 MG tablet Take 1 tablet by mouth once daily 90 tablet 3    aspirin EC 81 MG EC tablet Take 1 tablet by mouth daily 90 tablet 1    Esomeprazole Magnesium (NEXIUM PO) Take 40 mg by mouth daily      magnesium oxide (MAG-OX) 400 (240 Mg) MG tablet Take 1 tablet by mouth daily 30 tablet 0     Current Facility-Administered Medications   Medication Dose Route Frequency Provider Last Rate Last Admin    lidocaine (XYLOCAINE) 2 % uro-jet   Urethral Once John Bocanegra MD         Allergies:   Allergies   Allergen Reactions    Omeprazole Diarrhea    Sertraline Other (See Comments)     Suicidal ideation requiring hospitalization  Suicidal ideation requiring hospitalization  Suicidal ideation requiring hospitalization    Sulfa Antibiotics Nausea Only and Other (See Comments)     dizziness     Social History:   Social History     Socioeconomic History    Marital status: Single     Spouse name: Not on file    Number of children: Not on file    Years of

## 2024-07-31 ENCOUNTER — PREP FOR PROCEDURE (OUTPATIENT)
Dept: UROGYNECOLOGY | Age: 69
End: 2024-07-31

## 2024-07-31 DIAGNOSIS — N81.5 VAGINAL ENTEROCELE: ICD-10-CM

## 2024-07-31 DIAGNOSIS — N81.11 CYSTOCELE, MIDLINE: ICD-10-CM

## 2024-07-31 DIAGNOSIS — N81.6 RECTOCELE: ICD-10-CM

## 2024-07-31 DIAGNOSIS — N81.9 VAGINAL VAULT PROLAPSE: ICD-10-CM

## 2024-08-01 ENCOUNTER — TELEPHONE (OUTPATIENT)
Dept: FAMILY MEDICINE CLINIC | Age: 69
End: 2024-08-01

## 2024-08-01 DIAGNOSIS — N89.8 VAGINAL ITCHING: ICD-10-CM

## 2024-08-01 DIAGNOSIS — N89.8 VAGINAL IRRITATION: Primary | ICD-10-CM

## 2024-08-01 RX ORDER — CLOBETASOL PROPIONATE 0.5 MG/G
OINTMENT TOPICAL
Qty: 60 G | Refills: 0 | Status: SHIPPED | OUTPATIENT
Start: 2024-08-01

## 2024-08-01 NOTE — RESULT ENCOUNTER NOTE
Spoke with patient over the phone. Informed her that biopsy came positive for lichen sclerosis. Advised her to rub the clobetasol onto the vagina towards the back and making sure to apply it to the skin in between the vagina and the rectum. Prescription for Clobetasol sent in to preferred pharmacy per Doctor Daljit's orders. Patient verbalized understanding to all instructions and denies any questions at this time. Electronically signed by Viviana Davis RN on 8/1/2024 at 3:45 PM

## 2024-08-01 NOTE — TELEPHONE ENCOUNTER
ENTRY FOR [facility] MAMMOGRAM RAFFLE    Completion of mammogram before Oct 31st equals 1 entry. Completion same day as order/visit with [facility] will equal 2 entries.    Mammogram Completion date: 2/5/24    Ordering provider:     ZACHARY MOSS #: [id number]      [facility] Raffle will be held on Friday Nov 1st.  4 winners will be selected. (One from each office POD)  If chosen office staff will contact patient to coordinate raffle basket collection.

## 2024-08-06 ENCOUNTER — TELEPHONE (OUTPATIENT)
Dept: UROGYNECOLOGY | Age: 69
End: 2024-08-06

## 2024-08-06 ENCOUNTER — TELEPHONE (OUTPATIENT)
Dept: CARDIOLOGY CLINIC | Age: 69
End: 2024-08-06

## 2024-08-06 NOTE — TELEPHONE ENCOUNTER
Intermediate risk clinically for intermediate type of surgery. Proceed as planned. OK to hold aspirin for least amount of time possible per surgeon

## 2024-08-06 NOTE — TELEPHONE ENCOUNTER
Estrella Whiting, 1955    Cardiac Risk Assessment    What type of procedure are you having?  Vaginal wall suspension, interior and posterior repair.    When is your procedure scheduled for?  8/22/24    Medications to be stopped.  Aspirin 81 mg    What physician is performing your procedure?  Dr. Bocanegra    Phone Number:   N/a    Fax number to send the letter:   [phone]    Cardiologist:   NALLELY    Last Appointment:   4/23/24 [facility]    Next Appointment:   N/a       PT states she needs EKG done by us to be cleared per Dr. Bocanegra.

## 2024-08-06 NOTE — TELEPHONE ENCOUNTER
Called and left VM to inquire on progress of pre op clearances needed prior to surgery with Dr. Bocanegra - requested patient call office back when able.

## 2024-08-08 ENCOUNTER — TELEPHONE (OUTPATIENT)
Dept: UROGYNECOLOGY | Age: 69
End: 2024-08-08

## 2024-08-08 NOTE — TELEPHONE ENCOUNTER
Surgery scheduled for 8/22/24.    Called patient and allowed time for any additional questions prior to surgery.       Advised to stop all vitamins, herbal supplements, Aspirin, or NSAIDS the week prior to surgery.    Medications to hold morning of surgery: lasix, metformin, Spironolactone .      Confirmed date and time of surgery with patient as well as post op appointment.      Patient scheduled for visit with PCP on 8/14 and she is aware of what blood labs are needed prior to surgery.    Answered all questions patient had in regards to upcoming surgery - Asked patient to call office if there are any additional questions.

## 2024-08-14 ENCOUNTER — OFFICE VISIT (OUTPATIENT)
Dept: FAMILY MEDICINE CLINIC | Age: 69
End: 2024-08-14
Payer: MEDICARE

## 2024-08-14 VITALS
TEMPERATURE: 96.9 F | WEIGHT: 203 LBS | HEART RATE: 81 BPM | BODY MASS INDEX: 38.33 KG/M2 | DIASTOLIC BLOOD PRESSURE: 72 MMHG | HEIGHT: 61 IN | OXYGEN SATURATION: 94 % | SYSTOLIC BLOOD PRESSURE: 112 MMHG

## 2024-08-14 DIAGNOSIS — N81.9 VAGINAL VAULT PROLAPSE: ICD-10-CM

## 2024-08-14 DIAGNOSIS — Z01.818 PRE-OP EXAM: ICD-10-CM

## 2024-08-14 DIAGNOSIS — Z01.818 PRE-OP EXAM: Primary | ICD-10-CM

## 2024-08-14 DIAGNOSIS — R82.998 LEUKOCYTES IN URINE: ICD-10-CM

## 2024-08-14 LAB
BILIRUBIN, POC: NEGATIVE
BLOOD URINE, POC: NORMAL
CLARITY, POC: NORMAL
COLOR, POC: YELLOW
GLUCOSE URINE, POC: NEGATIVE
KETONES, POC: NEGATIVE
LEUKOCYTE EST, POC: NORMAL
NITRITE, POC: NEGATIVE
PH, POC: 6.5
PROTEIN, POC: NEGATIVE
SPECIFIC GRAVITY, POC: 1.01
UROBILINOGEN, POC: 0.2

## 2024-08-14 PROCEDURE — 81002 URINALYSIS NONAUTO W/O SCOPE: CPT | Performed by: PHYSICIAN ASSISTANT

## 2024-08-14 PROCEDURE — 3017F COLORECTAL CA SCREEN DOC REV: CPT | Performed by: PHYSICIAN ASSISTANT

## 2024-08-14 PROCEDURE — G8427 DOCREV CUR MEDS BY ELIG CLIN: HCPCS | Performed by: PHYSICIAN ASSISTANT

## 2024-08-14 PROCEDURE — 3078F DIAST BP <80 MM HG: CPT | Performed by: PHYSICIAN ASSISTANT

## 2024-08-14 PROCEDURE — 3074F SYST BP LT 130 MM HG: CPT | Performed by: PHYSICIAN ASSISTANT

## 2024-08-14 PROCEDURE — 1123F ACP DISCUSS/DSCN MKR DOCD: CPT | Performed by: PHYSICIAN ASSISTANT

## 2024-08-14 PROCEDURE — G8417 CALC BMI ABV UP PARAM F/U: HCPCS | Performed by: PHYSICIAN ASSISTANT

## 2024-08-14 PROCEDURE — G8399 PT W/DXA RESULTS DOCUMENT: HCPCS | Performed by: PHYSICIAN ASSISTANT

## 2024-08-14 PROCEDURE — 99214 OFFICE O/P EST MOD 30 MIN: CPT | Performed by: PHYSICIAN ASSISTANT

## 2024-08-14 PROCEDURE — 1036F TOBACCO NON-USER: CPT | Performed by: PHYSICIAN ASSISTANT

## 2024-08-14 PROCEDURE — 1090F PRES/ABSN URINE INCON ASSESS: CPT | Performed by: PHYSICIAN ASSISTANT

## 2024-08-14 SDOH — ECONOMIC STABILITY: FOOD INSECURITY: WITHIN THE PAST 12 MONTHS, THE FOOD YOU BOUGHT JUST DIDN'T LAST AND YOU DIDN'T HAVE MONEY TO GET MORE.: NEVER TRUE

## 2024-08-14 SDOH — ECONOMIC STABILITY: FOOD INSECURITY: WITHIN THE PAST 12 MONTHS, YOU WORRIED THAT YOUR FOOD WOULD RUN OUT BEFORE YOU GOT MONEY TO BUY MORE.: NEVER TRUE

## 2024-08-14 SDOH — ECONOMIC STABILITY: INCOME INSECURITY: HOW HARD IS IT FOR YOU TO PAY FOR THE VERY BASICS LIKE FOOD, HOUSING, MEDICAL CARE, AND HEATING?: NOT HARD AT ALL

## 2024-08-14 NOTE — PROGRESS NOTES
Preoperative Consultation      Estrella Whiting  YOB: 1955    Date of Service:  8/14/2024    Vitals:    08/14/24 1452   BP: 112/72   Site: Right Lower Arm   Position: Sitting   Cuff Size: Medium Adult   Pulse: 81   Temp: 96.9 °F (36.1 °C)   TempSrc: Temporal   SpO2: 94%   Weight: 92.1 kg (203 lb)   Height: 1.549 m (5' 0.98\")      Wt Readings from Last 2 Encounters:   08/14/24 92.1 kg (203 lb)   07/29/24 92.3 kg (203 lb 6.4 oz)     BP Readings from Last 3 Encounters:   08/14/24 112/72   07/30/24 107/78   07/29/24 130/68        Chief Complaint   Patient presents with    Pre-op Exam     Allergies   Allergen Reactions    Omeprazole Diarrhea    Sertraline Other (See Comments)     Suicidal ideation requiring hospitalization  Suicidal ideation requiring hospitalization  Suicidal ideation requiring hospitalization    Sulfa Antibiotics Nausea Only and Other (See Comments)     dizziness     Outpatient Medications Marked as Taking for the 8/14/24 encounter (Office Visit) with Yi Weinstein PA   Medication Sig Dispense Refill    clobetasol (TEMOVATE) 0.05 % ointment Apply ointment to posterior fourchette three times daily for 3 weeks. Then, take 1 week off. Then, apply three times daily for 3 weeks. 60 g 0    Semaglutide,0.25 or 0.5MG/DOS, (OZEMPIC, 0.25 OR 0.5 MG/DOSE,) 2 MG/3ML SOPN Inject 0.5 mg into the skin once a week 9 mL 0    furosemide (LASIX) 40 MG tablet Take 1 tablet by mouth daily 90 tablet 2    metFORMIN (GLUCOPHAGE) 500 MG tablet Take 1 tablet by mouth once daily with breakfast 90 tablet 1    promethazine (PHENERGAN) 12.5 MG tablet Take 1 tablet by mouth every 8 hours as needed for Nausea 20 tablet 2    DULoxetine (CYMBALTA) 60 MG extended release capsule Take 1 capsule by mouth once daily 90 capsule 0    calcium carbonate (TUMS) 500 MG chewable tablet       magnesium oxide (MAG-OX) 400 (240 Mg) MG tablet Take 1 tablet by mouth daily 30 tablet 0    atorvastatin (LIPITOR) 40 MG tablet Take 1

## 2024-08-15 ENCOUNTER — TELEPHONE (OUTPATIENT)
Dept: FAMILY MEDICINE CLINIC | Age: 69
End: 2024-08-15

## 2024-08-15 DIAGNOSIS — E83.52 HYPERCALCEMIA: Primary | ICD-10-CM

## 2024-08-15 LAB
ANION GAP SERPL CALCULATED.3IONS-SCNC: 17 MMOL/L (ref 3–16)
BACTERIA UR CULT: NORMAL
BUN SERPL-MCNC: 22 MG/DL (ref 7–20)
CALCIUM SERPL-MCNC: 10.9 MG/DL (ref 8.3–10.6)
CHLORIDE SERPL-SCNC: 96 MMOL/L (ref 99–110)
CO2 SERPL-SCNC: 27 MMOL/L (ref 21–32)
CREAT SERPL-MCNC: 1 MG/DL (ref 0.6–1.2)
DEPRECATED RDW RBC AUTO: 15.6 % (ref 12.4–15.4)
GFR SERPLBLD CREATININE-BSD FMLA CKD-EPI: 60 ML/MIN/{1.73_M2}
GLUCOSE SERPL-MCNC: 100 MG/DL (ref 70–99)
HCT VFR BLD AUTO: 42.6 % (ref 36–48)
HGB BLD-MCNC: 14 G/DL (ref 12–16)
MCH RBC QN AUTO: 28.5 PG (ref 26–34)
MCHC RBC AUTO-ENTMCNC: 32.9 G/DL (ref 31–36)
MCV RBC AUTO: 86.6 FL (ref 80–100)
PLATELET # BLD AUTO: 342 K/UL (ref 135–450)
PMV BLD AUTO: 9.1 FL (ref 5–10.5)
POTASSIUM SERPL-SCNC: 4.1 MMOL/L (ref 3.5–5.1)
RBC # BLD AUTO: 4.91 M/UL (ref 4–5.2)
SODIUM SERPL-SCNC: 140 MMOL/L (ref 136–145)
WBC # BLD AUTO: 9.7 K/UL (ref 4–11)

## 2024-08-15 NOTE — TELEPHONE ENCOUNTER
Spoke with patient.   Reviewed labs for upcoming surgery   Elevated calcium level- patient currently taking supplement.   Will hold and have labs rechecked on Tuesday.   Otherwise labs are stable and ok for surgery pending ca recheck.

## 2024-08-16 RX ORDER — SPIRONOLACTONE 25 MG/1
TABLET ORAL
Qty: 90 TABLET | Refills: 2 | Status: SHIPPED | OUTPATIENT
Start: 2024-08-16

## 2024-08-16 NOTE — PROGRESS NOTES
[facility] PRE-SURGICAL TESTING INSTRUCTIONS                      PRIOR TO PROCEDURE DATE:    1. PLEASE FOLLOW ANY INSTRUCTIONS GIVEN TO YOU PER YOUR SURGEON.      2. Arrange for someone to drive you home and be with you for the first 24 hours after discharge for your safety after your procedure for which you received sedation. Ensure it is someone we can share information with regarding your discharge.     NOTE: At this time ONLY 2 ADULTS may accompany you   One person ENCOURAGED to stay at hospital entire time if outpatient surgery      3. You must contact your surgeon for instructions IF:  You are taking any blood thinners, aspirin, anti-inflammatory or vitamins.  There is a change in your physical condition such as a cold, fever, rash, cuts, sores, or any other infection, especially near your surgical site.    4. Do not drink alcohol the day before or day of your procedure.  Do not use any recreational marijuana at least 24 hours or street drugs (heroin, cocaine) at minimum 5 days prior to your procedure.     5. A Pre-Surgical History and Physical MUST be completed WITHIN 30 DAYS OR LESS prior to your procedure.by your Physician or an Urgent Care        THE DAY OF YOUR PROCEDURE:  1.  Follow instructions for ARRIVAL TIME as DIRECTED BY YOUR SURGEON.     2. Enter the MAIN entrance from [facility] and follow the signs to the free Parking Garage or  Parking (offered free of charge 7 am-5pm).      3. Enter the Main Entrance of the hospital (do not enter from the lower level of the parking garage). Upon entrance, check in with the  at the surgical information desk on your LEFT.   Bring your insurance card and photo ID to register      4. DO NOT EAT ANYTHING 8 hours prior to arrival for surgery.  You may have up to 8 ounces of water 4 hours prior to your arrival for surgery.   NOTE: ALL Gastric, Bariatric & Bowel surgery patients - you MUST follow your surgeon's instructions regarding  eating/ drinking as you will have very specific instructions to follow.  If you did not receive these, call your surgeon's office immediately.     5. MEDICATIONS:  Take the following medications with a SMALL sip of water: Duloxetine  Hold Metformin day of surgery  Do NOT take any more doses of Ozempic until after your procedure  Bring your Albuterol Inhaler with you day of surgery  Use your usual dose of inhalers the morning of surgery. BRING your rescue inhaler with you to hospital.   Anesthesia does NOT want you to take insulin the morning of surgery. They will control your blood sugar while you are at the hospital. Please contact your ordering physician for instructions regarding your insulin the night before your procedure. If you have an insulin pump, please keep it set on basal rate.   Bariatric patient's call your surgeon if on diabetic medications as some may need to be stopped 1 week prior to surgery    6. Do not swallow additional water when brushing teeth. No gum, candy, mints, or ice chips. Refrain from smoking or at least decrease the amount on day of surgery.    7. Morning of surgery:   Take a shower with an antibacterial soap (i.e., Safeguard or Dial) OR your physician may have instructed you to use Hibiclens.  Dress in loose, comfortable clothing appropriate for redressing after your procedure.   Do not wear jewelry (including body piercings), make-up (especially NO eye make-up), fingernail polish (NO toenail polish if foot/leg surgery), lotion, powders, or metal hairclips.   Do not shave or wax for 72 hours prior to procedure near your operative site. Shaving with a razor can irritate your skin and make it easier to develop an infection. On the day of your procedure, any hair that needs to be removed near the surgical site will be 'clipped' by a healthcare worker using a special clipper designed to avoid skin irritation.    8. Dentures, glasses, or contacts will need to be removed before your  procedure. Bring cases for your glasses, contacts, dentures, or hearing aids to protect them while you are in surgery.      9. If you use a CPAP, please bring it with you on the day of your procedure.    10. If you use oxygen at home, please bring your oxygen tank with you to hospital..     11. We recommend that valuable personal belongings such as cash, cell phones, e-tablets, or jewelry, be left at home during your stay. The hospital will not be responsible for valuables that are not secured in the hospital safe. However, if your insurance requires a co-pay, you may want to bring a method of payment, i.e., Check or credit card, if you wish to pay your co-pay the day of surgery.      12. If you are to stay overnight, you may bring a bag with personal items. Please have any large items you may need brought in by your family after your arrival to your hospital room.    13. If you have a Living Will or Durable Power of , please bring a copy on the day of your procedure.     How we keep you safe and work to prevent surgical site infections:   1. Health care workers should always check your ID bracelet to verify your name and birth date. You will be asked many times to state your name, date of birth, and allergies.    2. Health care workers should always clean their hands with soap or alcohol gel before providing care to you. It is okay to ask anyone if they cleaned their hands before they touch you.    3. You will be actively involved in verifying the type of procedure you are having and ensuring the correct surgical site. This will be confirmed multiple times prior to your procedure. Do NOT shanna your surgery site UNLESS instructed to by your surgeon.     4. When you are in the operating room, your surgical site will be cleansed with a special soap, and in most cases, you will be given an antibiotic before the surgery begins.      What to expect AFTER your procedure?  1. Immediately following your procedure,

## 2024-08-20 DIAGNOSIS — E83.52 HYPERCALCEMIA: ICD-10-CM

## 2024-08-20 LAB
ANION GAP SERPL CALCULATED.3IONS-SCNC: 13 MMOL/L (ref 3–16)
BUN SERPL-MCNC: 18 MG/DL (ref 7–20)
CALCIUM SERPL-MCNC: 10.1 MG/DL (ref 8.3–10.6)
CHLORIDE SERPL-SCNC: 100 MMOL/L (ref 99–110)
CO2 SERPL-SCNC: 28 MMOL/L (ref 21–32)
CREAT SERPL-MCNC: 0.9 MG/DL (ref 0.6–1.2)
GFR SERPLBLD CREATININE-BSD FMLA CKD-EPI: 68 ML/MIN/{1.73_M2}
GLUCOSE SERPL-MCNC: 81 MG/DL (ref 70–99)
POTASSIUM SERPL-SCNC: 4.1 MMOL/L (ref 3.5–5.1)
SODIUM SERPL-SCNC: 141 MMOL/L (ref 136–145)

## 2024-08-21 ENCOUNTER — TELEPHONE (OUTPATIENT)
Dept: UROGYNECOLOGY | Age: 69
End: 2024-08-21

## 2024-08-21 ENCOUNTER — ANESTHESIA EVENT (OUTPATIENT)
Dept: OPERATING ROOM | Age: 69
End: 2024-08-21
Payer: MEDICARE

## 2024-08-21 NOTE — TELEPHONE ENCOUNTER
Patient called to confirm time of surgery tomorrow. Reviewed arrival time for surgery - she is aware that this is the time of arrival and not the time of actual surgery.     No further questions at this time.

## 2024-08-22 ENCOUNTER — HOSPITAL ENCOUNTER (OUTPATIENT)
Age: 69
Setting detail: OBSERVATION
Discharge: HOME | End: 2024-08-23
Attending: OBSTETRICS & GYNECOLOGY | Admitting: OBSTETRICS & GYNECOLOGY
Payer: MEDICARE

## 2024-08-22 ENCOUNTER — ANESTHESIA (OUTPATIENT)
Dept: OPERATING ROOM | Age: 69
End: 2024-08-22
Payer: MEDICARE

## 2024-08-22 DIAGNOSIS — N81.6 RECTOCELE: ICD-10-CM

## 2024-08-22 DIAGNOSIS — N81.5 VAGINAL ENTEROCELE: ICD-10-CM

## 2024-08-22 DIAGNOSIS — N81.9 VAGINAL VAULT PROLAPSE: ICD-10-CM

## 2024-08-22 DIAGNOSIS — N89.8 VAGINAL ITCHING: ICD-10-CM

## 2024-08-22 DIAGNOSIS — G89.18 POST-OP PAIN: Primary | ICD-10-CM

## 2024-08-22 DIAGNOSIS — N89.8 VAGINAL IRRITATION: ICD-10-CM

## 2024-08-22 DIAGNOSIS — N81.11 CYSTOCELE, MIDLINE: ICD-10-CM

## 2024-08-22 PROBLEM — K46.9 FEMALE RECTOCELE WITH ENTEROCELE: Status: ACTIVE | Noted: 2024-08-22

## 2024-08-22 LAB
GLUCOSE BLD-MCNC: 103 MG/DL (ref 70–99)
GLUCOSE BLD-MCNC: 111 MG/DL (ref 70–99)
GLUCOSE BLD-MCNC: 220 MG/DL (ref 70–99)
GLUCOSE BLD-MCNC: 99 MG/DL (ref 70–99)
PERFORMED ON: ABNORMAL
PERFORMED ON: NORMAL

## 2024-08-22 PROCEDURE — C9290 INJ, BUPIVACAINE LIPOSOME: HCPCS | Performed by: OBSTETRICS & GYNECOLOGY

## 2024-08-22 PROCEDURE — 2500000003 HC RX 250 WO HCPCS

## 2024-08-22 PROCEDURE — 3700000000 HC ANESTHESIA ATTENDED CARE: Performed by: OBSTETRICS & GYNECOLOGY

## 2024-08-22 PROCEDURE — 87205 SMEAR GRAM STAIN: CPT

## 2024-08-22 PROCEDURE — 87102 FUNGUS ISOLATION CULTURE: CPT

## 2024-08-22 PROCEDURE — 96372 THER/PROPH/DIAG INJ SC/IM: CPT

## 2024-08-22 PROCEDURE — 2500000003 HC RX 250 WO HCPCS: Performed by: OBSTETRICS & GYNECOLOGY

## 2024-08-22 PROCEDURE — 6360000002 HC RX W HCPCS: Performed by: OBSTETRICS & GYNECOLOGY

## 2024-08-22 PROCEDURE — 2720000010 HC SURG SUPPLY STERILE: Performed by: OBSTETRICS & GYNECOLOGY

## 2024-08-22 PROCEDURE — 57283 COLPOPEXY INTRAPERITONEAL: CPT | Performed by: OBSTETRICS & GYNECOLOGY

## 2024-08-22 PROCEDURE — 3700000001 HC ADD 15 MINUTES (ANESTHESIA): Performed by: OBSTETRICS & GYNECOLOGY

## 2024-08-22 PROCEDURE — 2580000003 HC RX 258: Performed by: OBSTETRICS & GYNECOLOGY

## 2024-08-22 PROCEDURE — 3600000004 HC SURGERY LEVEL 4 BASE: Performed by: OBSTETRICS & GYNECOLOGY

## 2024-08-22 PROCEDURE — 6360000002 HC RX W HCPCS

## 2024-08-22 PROCEDURE — 57250 REPAIR RECTUM & VAGINA: CPT | Performed by: OBSTETRICS & GYNECOLOGY

## 2024-08-22 PROCEDURE — 2709999900 HC NON-CHARGEABLE SUPPLY: Performed by: OBSTETRICS & GYNECOLOGY

## 2024-08-22 PROCEDURE — 3600000014 HC SURGERY LEVEL 4 ADDTL 15MIN: Performed by: OBSTETRICS & GYNECOLOGY

## 2024-08-22 PROCEDURE — 6370000000 HC RX 637 (ALT 250 FOR IP): Performed by: OBSTETRICS & GYNECOLOGY

## 2024-08-22 PROCEDURE — 7100000001 HC PACU RECOVERY - ADDTL 15 MIN: Performed by: OBSTETRICS & GYNECOLOGY

## 2024-08-22 PROCEDURE — 7100000000 HC PACU RECOVERY - FIRST 15 MIN: Performed by: OBSTETRICS & GYNECOLOGY

## 2024-08-22 PROCEDURE — 87075 CULTR BACTERIA EXCEPT BLOOD: CPT

## 2024-08-22 PROCEDURE — 87070 CULTURE OTHR SPECIMN AEROBIC: CPT

## 2024-08-22 PROCEDURE — G0378 HOSPITAL OBSERVATION PER HR: HCPCS

## 2024-08-22 RX ORDER — SODIUM CHLORIDE 9 MG/ML
INJECTION, SOLUTION INTRAVENOUS PRN
Status: DISCONTINUED | OUTPATIENT
Start: 2024-08-22 | End: 2024-08-23 | Stop reason: HOSPADM

## 2024-08-22 RX ORDER — SODIUM CHLORIDE, SODIUM LACTATE, POTASSIUM CHLORIDE, CALCIUM CHLORIDE 600; 310; 30; 20 MG/100ML; MG/100ML; MG/100ML; MG/100ML
INJECTION, SOLUTION INTRAVENOUS CONTINUOUS
Status: DISCONTINUED | OUTPATIENT
Start: 2024-08-22 | End: 2024-08-22 | Stop reason: HOSPADM

## 2024-08-22 RX ORDER — HYDROMORPHONE HYDROCHLORIDE 2 MG/ML
INJECTION, SOLUTION INTRAMUSCULAR; INTRAVENOUS; SUBCUTANEOUS PRN
Status: DISCONTINUED | OUTPATIENT
Start: 2024-08-22 | End: 2024-08-22 | Stop reason: SDUPTHER

## 2024-08-22 RX ORDER — DILTIAZEM HYDROCHLORIDE 180 MG/1
360 CAPSULE, COATED, EXTENDED RELEASE ORAL NIGHTLY
Status: DISCONTINUED | OUTPATIENT
Start: 2024-08-22 | End: 2024-08-23 | Stop reason: HOSPADM

## 2024-08-22 RX ORDER — SODIUM CHLORIDE 0.9 % (FLUSH) 0.9 %
5-40 SYRINGE (ML) INJECTION EVERY 12 HOURS SCHEDULED
Status: DISCONTINUED | OUTPATIENT
Start: 2024-08-22 | End: 2024-08-22 | Stop reason: HOSPADM

## 2024-08-22 RX ORDER — SODIUM CHLORIDE 9 MG/ML
INJECTION, SOLUTION INTRAVENOUS PRN
Status: DISCONTINUED | OUTPATIENT
Start: 2024-08-22 | End: 2024-08-22 | Stop reason: HOSPADM

## 2024-08-22 RX ORDER — METOCLOPRAMIDE HYDROCHLORIDE 5 MG/ML
10 INJECTION INTRAMUSCULAR; INTRAVENOUS
Status: DISCONTINUED | OUTPATIENT
Start: 2024-08-22 | End: 2024-08-22 | Stop reason: HOSPADM

## 2024-08-22 RX ORDER — ATORVASTATIN CALCIUM 40 MG/1
40 TABLET, FILM COATED ORAL DAILY
Status: DISCONTINUED | OUTPATIENT
Start: 2024-08-22 | End: 2024-08-23 | Stop reason: HOSPADM

## 2024-08-22 RX ORDER — ENOXAPARIN SODIUM 100 MG/ML
40 INJECTION SUBCUTANEOUS ONCE
Status: COMPLETED | OUTPATIENT
Start: 2024-08-22 | End: 2024-08-22

## 2024-08-22 RX ORDER — FENTANYL CITRATE 50 UG/ML
25 INJECTION, SOLUTION INTRAMUSCULAR; INTRAVENOUS EVERY 5 MIN PRN
Status: DISCONTINUED | OUTPATIENT
Start: 2024-08-22 | End: 2024-08-22 | Stop reason: HOSPADM

## 2024-08-22 RX ORDER — LORAZEPAM 2 MG/ML
0.5 INJECTION INTRAMUSCULAR
Status: DISCONTINUED | OUTPATIENT
Start: 2024-08-22 | End: 2024-08-22 | Stop reason: HOSPADM

## 2024-08-22 RX ORDER — SODIUM CHLORIDE 0.9 % (FLUSH) 0.9 %
5-40 SYRINGE (ML) INJECTION PRN
Status: DISCONTINUED | OUTPATIENT
Start: 2024-08-22 | End: 2024-08-22 | Stop reason: HOSPADM

## 2024-08-22 RX ORDER — PROPOFOL 10 MG/ML
INJECTION, EMULSION INTRAVENOUS PRN
Status: DISCONTINUED | OUTPATIENT
Start: 2024-08-22 | End: 2024-08-22 | Stop reason: SDUPTHER

## 2024-08-22 RX ORDER — OXYCODONE HYDROCHLORIDE 5 MG/1
10 TABLET ORAL EVERY 4 HOURS PRN
Status: DISCONTINUED | OUTPATIENT
Start: 2024-08-22 | End: 2024-08-23 | Stop reason: HOSPADM

## 2024-08-22 RX ORDER — OXYCODONE HYDROCHLORIDE 5 MG/1
5 TABLET ORAL EVERY 4 HOURS PRN
Status: DISCONTINUED | OUTPATIENT
Start: 2024-08-22 | End: 2024-08-23 | Stop reason: HOSPADM

## 2024-08-22 RX ORDER — ONDANSETRON 2 MG/ML
4 INJECTION INTRAMUSCULAR; INTRAVENOUS
Status: DISCONTINUED | OUTPATIENT
Start: 2024-08-22 | End: 2024-08-22 | Stop reason: HOSPADM

## 2024-08-22 RX ORDER — MEPERIDINE HYDROCHLORIDE 25 MG/ML
12.5 INJECTION INTRAMUSCULAR; INTRAVENOUS; SUBCUTANEOUS EVERY 5 MIN PRN
Status: DISCONTINUED | OUTPATIENT
Start: 2024-08-22 | End: 2024-08-22 | Stop reason: HOSPADM

## 2024-08-22 RX ORDER — SPIRONOLACTONE 25 MG/1
25 TABLET ORAL DAILY
Status: DISCONTINUED | OUTPATIENT
Start: 2024-08-22 | End: 2024-08-23 | Stop reason: HOSPADM

## 2024-08-22 RX ORDER — ALBUTEROL SULFATE 0.83 MG/ML
2.5 SOLUTION RESPIRATORY (INHALATION) EVERY 4 HOURS PRN
Status: DISCONTINUED | OUTPATIENT
Start: 2024-08-22 | End: 2024-08-23 | Stop reason: HOSPADM

## 2024-08-22 RX ORDER — PROMETHAZINE HYDROCHLORIDE 25 MG/1
12.5 TABLET ORAL EVERY 6 HOURS PRN
Status: DISCONTINUED | OUTPATIENT
Start: 2024-08-22 | End: 2024-08-23 | Stop reason: HOSPADM

## 2024-08-22 RX ORDER — DULOXETIN HYDROCHLORIDE 60 MG/1
60 CAPSULE, DELAYED RELEASE ORAL DAILY
Status: DISCONTINUED | OUTPATIENT
Start: 2024-08-22 | End: 2024-08-23 | Stop reason: HOSPADM

## 2024-08-22 RX ORDER — SUCCINYLCHOLINE/SOD CL,ISO/PF 200MG/10ML
SYRINGE (ML) INTRAVENOUS PRN
Status: DISCONTINUED | OUTPATIENT
Start: 2024-08-22 | End: 2024-08-22 | Stop reason: SDUPTHER

## 2024-08-22 RX ORDER — SODIUM CHLORIDE 0.9 % (FLUSH) 0.9 %
5-40 SYRINGE (ML) INJECTION EVERY 12 HOURS SCHEDULED
Status: DISCONTINUED | OUTPATIENT
Start: 2024-08-22 | End: 2024-08-23 | Stop reason: HOSPADM

## 2024-08-22 RX ORDER — ROCURONIUM BROMIDE 10 MG/ML
INJECTION, SOLUTION INTRAVENOUS PRN
Status: DISCONTINUED | OUTPATIENT
Start: 2024-08-22 | End: 2024-08-22 | Stop reason: SDUPTHER

## 2024-08-22 RX ORDER — HYDROMORPHONE HYDROCHLORIDE 1 MG/ML
0.5 INJECTION, SOLUTION INTRAMUSCULAR; INTRAVENOUS; SUBCUTANEOUS EVERY 5 MIN PRN
Status: DISCONTINUED | OUTPATIENT
Start: 2024-08-22 | End: 2024-08-22 | Stop reason: HOSPADM

## 2024-08-22 RX ORDER — OXYCODONE HYDROCHLORIDE 5 MG/1
10 TABLET ORAL PRN
Status: DISCONTINUED | OUTPATIENT
Start: 2024-08-22 | End: 2024-08-22 | Stop reason: HOSPADM

## 2024-08-22 RX ORDER — OXYCODONE HYDROCHLORIDE 5 MG/1
5 TABLET ORAL PRN
Status: DISCONTINUED | OUTPATIENT
Start: 2024-08-22 | End: 2024-08-22 | Stop reason: HOSPADM

## 2024-08-22 RX ORDER — ACETAMINOPHEN 500 MG
1000 TABLET ORAL EVERY 8 HOURS SCHEDULED
Status: DISCONTINUED | OUTPATIENT
Start: 2024-08-22 | End: 2024-08-23 | Stop reason: HOSPADM

## 2024-08-22 RX ORDER — SODIUM CHLORIDE 9 MG/ML
INJECTION, SOLUTION INTRAVENOUS CONTINUOUS
Status: DISCONTINUED | OUTPATIENT
Start: 2024-08-22 | End: 2024-08-23 | Stop reason: HOSPADM

## 2024-08-22 RX ORDER — DEXAMETHASONE SODIUM PHOSPHATE 4 MG/ML
INJECTION, SOLUTION INTRA-ARTICULAR; INTRALESIONAL; INTRAMUSCULAR; INTRAVENOUS; SOFT TISSUE PRN
Status: DISCONTINUED | OUTPATIENT
Start: 2024-08-22 | End: 2024-08-22 | Stop reason: SDUPTHER

## 2024-08-22 RX ORDER — LIDOCAINE HYDROCHLORIDE AND EPINEPHRINE 10; 10 MG/ML; UG/ML
INJECTION, SOLUTION INFILTRATION; PERINEURAL PRN
Status: DISCONTINUED | OUTPATIENT
Start: 2024-08-22 | End: 2024-08-22 | Stop reason: HOSPADM

## 2024-08-22 RX ORDER — ALBUTEROL SULFATE 90 UG/1
2 AEROSOL, METERED RESPIRATORY (INHALATION) EVERY 4 HOURS PRN
Status: DISCONTINUED | OUTPATIENT
Start: 2024-08-22 | End: 2024-08-22

## 2024-08-22 RX ORDER — ESOMEPRAZOLE MAGNESIUM 20 MG/1
40 GRANULE, DELAYED RELEASE ORAL DAILY
Status: DISCONTINUED | OUTPATIENT
Start: 2024-08-22 | End: 2024-08-22

## 2024-08-22 RX ORDER — FENTANYL CITRATE 50 UG/ML
INJECTION, SOLUTION INTRAMUSCULAR; INTRAVENOUS PRN
Status: DISCONTINUED | OUTPATIENT
Start: 2024-08-22 | End: 2024-08-22 | Stop reason: SDUPTHER

## 2024-08-22 RX ORDER — ONDANSETRON 2 MG/ML
INJECTION INTRAMUSCULAR; INTRAVENOUS PRN
Status: DISCONTINUED | OUTPATIENT
Start: 2024-08-22 | End: 2024-08-22 | Stop reason: SDUPTHER

## 2024-08-22 RX ORDER — PANTOPRAZOLE SODIUM 40 MG/1
40 TABLET, DELAYED RELEASE ORAL
Status: DISCONTINUED | OUTPATIENT
Start: 2024-08-23 | End: 2024-08-23 | Stop reason: HOSPADM

## 2024-08-22 RX ORDER — NALOXONE HYDROCHLORIDE 0.4 MG/ML
INJECTION, SOLUTION INTRAMUSCULAR; INTRAVENOUS; SUBCUTANEOUS PRN
Status: DISCONTINUED | OUTPATIENT
Start: 2024-08-22 | End: 2024-08-22 | Stop reason: HOSPADM

## 2024-08-22 RX ORDER — FUROSEMIDE 40 MG
40 TABLET ORAL DAILY
Status: DISCONTINUED | OUTPATIENT
Start: 2024-08-22 | End: 2024-08-23 | Stop reason: HOSPADM

## 2024-08-22 RX ORDER — HYDRALAZINE HYDROCHLORIDE 20 MG/ML
10 INJECTION INTRAMUSCULAR; INTRAVENOUS
Status: DISCONTINUED | OUTPATIENT
Start: 2024-08-22 | End: 2024-08-22 | Stop reason: HOSPADM

## 2024-08-22 RX ORDER — SIMETHICONE 80 MG
160 TABLET,CHEWABLE ORAL 4 TIMES DAILY PRN
Status: DISCONTINUED | OUTPATIENT
Start: 2024-08-22 | End: 2024-08-23 | Stop reason: HOSPADM

## 2024-08-22 RX ORDER — LABETALOL HYDROCHLORIDE 5 MG/ML
10 INJECTION, SOLUTION INTRAVENOUS
Status: DISCONTINUED | OUTPATIENT
Start: 2024-08-22 | End: 2024-08-22 | Stop reason: HOSPADM

## 2024-08-22 RX ORDER — SODIUM CHLORIDE 0.9 % (FLUSH) 0.9 %
5-40 SYRINGE (ML) INJECTION PRN
Status: DISCONTINUED | OUTPATIENT
Start: 2024-08-22 | End: 2024-08-23 | Stop reason: HOSPADM

## 2024-08-22 RX ORDER — ONDANSETRON 2 MG/ML
4 INJECTION INTRAMUSCULAR; INTRAVENOUS EVERY 6 HOURS PRN
Status: DISCONTINUED | OUTPATIENT
Start: 2024-08-22 | End: 2024-08-23 | Stop reason: HOSPADM

## 2024-08-22 RX ADMIN — FUROSEMIDE 40 MG: 40 TABLET ORAL at 16:59

## 2024-08-22 RX ADMIN — OXYCODONE HYDROCHLORIDE 10 MG: 5 TABLET ORAL at 20:32

## 2024-08-22 RX ADMIN — ROCURONIUM BROMIDE 50 MG: 10 INJECTION, SOLUTION INTRAVENOUS at 12:11

## 2024-08-22 RX ADMIN — HYDROMORPHONE HYDROCHLORIDE 0.4 MG: 2 INJECTION, SOLUTION INTRAMUSCULAR; INTRAVENOUS; SUBCUTANEOUS at 12:50

## 2024-08-22 RX ADMIN — ACETAMINOPHEN 1000 MG: 500 TABLET ORAL at 17:00

## 2024-08-22 RX ADMIN — ROCURONIUM BROMIDE 50 MG: 10 INJECTION, SOLUTION INTRAVENOUS at 12:36

## 2024-08-22 RX ADMIN — ONDANSETRON 4 MG: 2 INJECTION INTRAMUSCULAR; INTRAVENOUS at 12:12

## 2024-08-22 RX ADMIN — Medication 140 MG: at 12:07

## 2024-08-22 RX ADMIN — WATER 2000 MG: 1 INJECTION INTRAMUSCULAR; INTRAVENOUS; SUBCUTANEOUS at 12:11

## 2024-08-22 RX ADMIN — SODIUM CHLORIDE, POTASSIUM CHLORIDE, SODIUM LACTATE AND CALCIUM CHLORIDE: 600; 310; 30; 20 INJECTION, SOLUTION INTRAVENOUS at 11:25

## 2024-08-22 RX ADMIN — SPIRONOLACTONE 25 MG: 25 TABLET ORAL at 17:00

## 2024-08-22 RX ADMIN — SUGAMMADEX 200 MG: 100 INJECTION, SOLUTION INTRAVENOUS at 13:40

## 2024-08-22 RX ADMIN — HYDROMORPHONE HYDROCHLORIDE 0.4 MG: 2 INJECTION, SOLUTION INTRAMUSCULAR; INTRAVENOUS; SUBCUTANEOUS at 12:29

## 2024-08-22 RX ADMIN — ENOXAPARIN SODIUM 40 MG: 100 INJECTION SUBCUTANEOUS at 11:25

## 2024-08-22 RX ADMIN — DEXAMETHASONE SODIUM PHOSPHATE 4 MG: 4 INJECTION INTRA-ARTICULAR; INTRALESIONAL; INTRAMUSCULAR; INTRAVENOUS; SOFT TISSUE at 12:12

## 2024-08-22 RX ADMIN — PROPOFOL 150 MG: 10 INJECTION, EMULSION INTRAVENOUS at 12:07

## 2024-08-22 RX ADMIN — SODIUM CHLORIDE: 9 INJECTION, SOLUTION INTRAVENOUS at 16:16

## 2024-08-22 RX ADMIN — HYDROMORPHONE HYDROCHLORIDE 0.2 MG: 2 INJECTION, SOLUTION INTRAMUSCULAR; INTRAVENOUS; SUBCUTANEOUS at 13:02

## 2024-08-22 RX ADMIN — DILTIAZEM HYDROCHLORIDE 360 MG: 180 CAPSULE, EXTENDED RELEASE ORAL at 22:38

## 2024-08-22 RX ADMIN — FENTANYL CITRATE 25 MCG: 50 INJECTION, SOLUTION INTRAMUSCULAR; INTRAVENOUS at 12:07

## 2024-08-22 RX ADMIN — FENTANYL CITRATE 75 MCG: 50 INJECTION, SOLUTION INTRAMUSCULAR; INTRAVENOUS at 12:23

## 2024-08-22 RX ADMIN — SODIUM CHLORIDE, PRESERVATIVE FREE 10 ML: 5 INJECTION INTRAVENOUS at 20:33

## 2024-08-22 RX ADMIN — ACETAMINOPHEN 1000 MG: 500 TABLET ORAL at 22:38

## 2024-08-22 ASSESSMENT — PAIN DESCRIPTION - DESCRIPTORS: DESCRIPTORS: ACHING;SORE

## 2024-08-22 ASSESSMENT — PAIN SCALES - GENERAL: PAINLEVEL_OUTOF10: 7

## 2024-08-22 ASSESSMENT — PAIN DESCRIPTION - ORIENTATION: ORIENTATION: MID

## 2024-08-22 ASSESSMENT — PAIN DESCRIPTION - LOCATION: LOCATION: ABDOMEN;VAGINA

## 2024-08-22 ASSESSMENT — PAIN - FUNCTIONAL ASSESSMENT
PAIN_FUNCTIONAL_ASSESSMENT: PREVENTS OR INTERFERES SOME ACTIVE ACTIVITIES AND ADLS
PAIN_FUNCTIONAL_ASSESSMENT: 0-10

## 2024-08-22 NOTE — FLOWSHEET NOTE
PACU Transfer Note    Vitals:    08/22/24 1600   BP: 135/68   Pulse: 91   Resp: 14   Temp: 98.8 °F (37.1 °C)   SpO2: 95%       In: 1070 [P.O.:120; I.V.:950]  Out: 125 [Urine:75]    Pain assessment:  none, had exparel for local  Pain Level: 0    Report given to Receiving unit RNHenry at bedside in PACU. Transferred with all belongings to ready room in bed with o2 on at 1 liter per PACU transporterDelia. Call placed to patient's sister  for update and to direct to ready room.    8/22/2024 4:10 PM

## 2024-08-22 NOTE — BRIEF OP NOTE
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  .    Surgeon(s):  Darwin Bocanegra MD    Assistant:  Surgical Assistant: Debra Garcia    Anesthesia: General    Estimated Blood Loss (mL): 50    Complications: None    Specimens:   ID Type Source Tests Collected by Time Destination   1 : INTRAPERITONEAL FLUID Tissue Tissue CULTURE, FUNGUS, CULTURE, ANAEROBIC AND AEROBIC Darwin Bocanegra MD 8/22/2024 1246        Implants:  * No implants in log *      Drains:   Urinary Catheter 08/22/24 Harding (Active)       Findings:  Infection Present At Time Of Surgery (PATOS) (choose all levels that have infection present):  - Superficial Infection (skin/subcutaneous) present as evidenced by purulent fluid  Other Findings: purulent material noticed at injection on vaginal wall    Electronically signed by DARWIN BOCANEGRA MD on 8/22/2024 at 1:38 PM

## 2024-08-22 NOTE — ANESTHESIA PRE PROCEDURE
\"LABABO\"    Drug/Infectious Status (If Applicable):  No results found for: \"HIV\", \"HEPCAB\"    COVID-19 Screening (If Applicable):   Lab Results   Component Value Date/Time    COVID19 NOT DETECTED 03/04/2024 08:15 PM           Anesthesia Evaluation     history of anesthetic complications: PONV.  Airway: Mallampati: IV     Neck ROM: full  Mouth opening: > = 3 FB   Dental:    (+) poor dentition      Pulmonary:normal exam  breath sounds clear to auscultation                             Cardiovascular:    (+) hypertension:      ECG reviewed  Rhythm: regular  Rate: normal  Echocardiogram reviewed                  Neuro/Psych:               GI/Hepatic/Renal:   (+) GERD:, morbid obesity          Endo/Other:    (+) DiabetesType II DM.                 Abdominal:   (+) obese          Vascular:          Other Findings:         Anesthesia Plan      general     ASA 3       Induction: intravenous.    MIPS: Postoperative opioids intended and Prophylactic antiemetics administered.  Anesthetic plan and risks discussed with patient.    Use of blood products discussed with patient whom.    Plan discussed with CRNA.    Attending anesthesiologist reviewed and agrees with Preprocedure content              Fransisco Norwood MD   8/21/2024

## 2024-08-22 NOTE — OP NOTE
The patient understood all of these risks and desired to proceed.      OPERATIVE NOTE:   The patient was taken to the operating room and general anesthesia was found to be adequate. She was prepped and draped in the normal sterile fashion and placed in Uriel stirrups. Preoperative antibiotics were administered in the patient holding area. The bladder was drained with a Harding catheter.     DISSECTION: The most distal portion of the prolapse was identified.  An allis clamp was placed midline at this point followed by other clamps in the midline moving cephalad. The vaginal wall was then injected with 1% lidocaine with epinephrine along the midline of the prolapse.  While injecting near the apex there was a purulent material that began to escape from where the needle site was located.  A knife was used to make an incision in the vaginal mucosa.  Clamps were placed along this incision bilaterally and the mucosa was then dissected off the fibromuscular layer.  The rectum, enterocele and bladder were carefully identified during the dissection.  The enterocele sac was carefully identified and sharply entered.  Again there appeared some purulent material that was difficult to tell where it was coming from.  In retrospect I believe this was coming from a inclusion cyst on the vagina.  I did consult general surgery, Dr. Moreau who was working next-door.  I copiously irrigated the pelvis and took a culture.  The Raoul retractor was placed anteriorly.     VAGINAL VAULT SUSPENSION: A moist laparotomy sponge was then placed in the cul-de-sac. The uterosacral ligaments were palpated on both sides and Allis clamps were placed on the ligaments. Two sutures of 0 PDS were placed through the each uterosacral ligament and tagged to be later brought through the vaginal mucosa. The laparotomy sponges were removed and the preliminary sponge count was correct.        RECTOCELE REPAIR AND PERINEORRHAPHY:  At t this time, after injection  with the lidocaine mixture, a avinash-shaped wedge of tissue was taken from the posterior perineum and vagina after two Allis clamps were placed on the posterior forchette in the appropriate position to ensure that the vaginal introitus was the appropriate size.  With one finger in the rectum, the rectocele was then dissected free of the vaginal mucosa by  means of sharp dissection.  The most inferior sutures of the vault suspension were then passed through the proximal edge of the fibromuscular wall of the vagina this was then tied down which reduced the rectocele and enterocele.  The rectocele was then further plicated using 2-0 Prolene suture in an interrupted fashion.  Additional 2-0 Vicryl sutures were also used.  The appropriate amount of vaginal epithelium was then trimmed.  The vault suspension sutures were then passed through the vaginal epithelium and held.  The vaginal epithelium was then closed with 2-0 Vicryl suture in a running unlocked fashion down to about 3 cm above the perineum.  The vault suspension sutures were then tied down with excellent elevation of the vaginal apex.        CYSTOURETHROSCOPY:  Cystourethroscopy was performed to confirm no injury to the bladder or urethra.  The bilateral ureters were noted to efflux strongly.   The posterior vaginal mucosa was then closed using 3-0 Vicryl in a running fashion and the perineum closed in a subcuticular fashion.        The tails of the vaginal vault sutures were trimmed in the vagina.  Iodiform vaginal packing was placed in the vagina and the Harding catheter was left in place.  Sponge, lap and needle counts were correct x 2.  There were no complications.  The patient tolerated the procedure well and was taken to the recovery room in stable condition.        Electronically signed by DARWIN BAEZ MD on 8/22/2024 at 1:57 PM

## 2024-08-22 NOTE — ANESTHESIA POSTPROCEDURE EVALUATION
Department of Anesthesiology  Postprocedure Note    Patient: Estrella Whiting  MRN: [id number]  YOB: 1955  Date of evaluation: 8/22/2024    Procedure Summary       Date: 08/22/24 Room / Location: [place] [address] / [facility]    Anesthesia Start: 1202 Anesthesia Stop: 1355    Procedures:       VAGINAL VAULT SUSPENSION, POSTERIOR REPAIR, CYSTOSCOPY      .      . Diagnosis:       Vaginal vault prolapse      Cystocele, midline      Rectocele      Vaginal enterocele      (Vaginal vault prolapse [N81.9])      (Cystocele, midline [N81.11])      (Rectocele [N81.6])      (Vaginal enterocele [N81.5])    Surgeons: John Bocanegra MD Responsible Provider: Fransisco Norwood MD    Anesthesia Type: general ASA Status: 3            Anesthesia Type: No value filed.    Fidel Phase I: Fidel Score: 9    Fidel Phase II:      Anesthesia Post Evaluation    Patient location during evaluation: PACU  Patient participation: complete - patient participated  Level of consciousness: awake and alert  Pain score: 0  Airway patency: patent  Nausea & Vomiting: no nausea and no vomiting  Cardiovascular status: blood pressure returned to baseline  Respiratory status: acceptable  Hydration status: euvolemic  Pain management: adequate    No notable events documented.

## 2024-08-22 NOTE — DISCHARGE INSTRUCTIONS
strong pain medications containing small amounts of narcotic painkillers. Some things you may want to remember:  You should use naproxyn (Naprosyn), ibuprofen (Motrin or Advil), Acetominophen (Tylenol), or other non-steroidal anti-inflammatory medications for your pain in addition to the strong narcotic pain medications, which you should use \"as needed\"   Take your pain medications when your first begin feeling discomfort. Don't wait until your pain is intense to take your medications. When used as directed, you will not become \"addicted\" to the pain medications.   The narcotic pain medications we send you home with may cause nausea or constipation. As your surgery heals, you may find that you feel better when you don't take those medications. If Tylenol or Motrin relieves the pain, use those medications instead.   Don't drive while taking narcotic pain medications.   Incision Care -   Showers (NOT tub baths) are preferred for the first 2 weeks after surgery. If you have an abdominal incision, it is ok for this to get wet. If the incision appears dirty or caked, you may clean it with hydrogen peroxide on a cotton swab. You may have small plastic bandages called Steri-strips across the incision. There is no need to worry if these fall off. If they begin to curl at the edges, simply trim with scissors.  If you have had vaginal surgery, showers (NOT tub baths) are preferred for the first 2 weeks after surgery. You may do sitz baths once or twice a day with warm water. A tablespoon of Epsom salts mixed in the water may help healing and decrease pain. DO NOT douche.    Bladder Catheter -   It is very normal to go home from the hospital with a catheter in your bladder. The surgery your doctor performed tends to cause swelling around the opening to the bladder, making it difficult for you to pass urine. If this is the case, you will be given instructions in the hospital on the care of the catheter, and will be told when to  follow-up   Six-week follow-up   Three month follow-up

## 2024-08-22 NOTE — PROGRESS NOTES
Patient ayden to room 5318 from pacu. Patient is A&O x 4. VSS. Patient oriented to the room all safety measures in place. Patient given IS and SCDs at this time. Admission orders released and patient 4 eyes completed. Admission documentation completed. No other needs are noted at this time.    [x] Bed alarm on and cord plugged into wall  [x] Bed in lowest position  [x] Call light and bedside table within reach  [x] Patient educated on all safety measures  []Oxygen connected to wall (if applicable)     Nurse 1 Esignature: Electronically signed by Henry Alvares, RN on 8/22/24 at 5:05 PM EDT  Nurse 2 Esignature: Electronically signed by Yi Allen RN on 8/22/24 at 5:15 PM EDT

## 2024-08-22 NOTE — FLOWSHEET NOTE
ED to inpatient nurses report      Chief Complaint:  Chief Complaint   Patient presents with    Shortness of Breath     Present to ED from: home    MOA:     LOC: pt does not respond to verbal or painful stimuli- pt moans or is incomprehensible   Mobility: Fully dependent  Oxygen Baseline: 4 L NC    Current needs required: BIPAP- pt is limited code and do not intubate      Code Status:   Prior    What abnormal results were found and what did you give/do to treat them?   Any procedures or intervention occur? BIPAP, lasix    Mental Status:  Level of Consciousness: Alert (0)    Psych Assessment:        Vitals:  Patient Vitals for the past 24 hrs:   BP Temp Temp src Pulse Resp SpO2 Height Weight   01/29/24 0034 (!) 117/51 -- -- -- -- -- -- --   01/29/24 0031 (!) 121/53 -- -- 60 16 99 % -- --   01/29/24 0006 139/83 -- -- 63 20 93 % -- --   01/28/24 2352 108/71 97.3 °F (36.3 °C) Rectal 85 24 90 % 1.575 m (5' 2\") 72.6 kg (160 lb)        LDAs:   Peripheral IV 01/29/24 Left Antecubital (Active)   Site Assessment Clean, dry & intact 01/29/24 0001   Line Status Normal saline locked 01/29/24 0031   Line Care Connections checked and tightened 01/29/24 0031   Phlebitis Assessment No symptoms 01/29/24 0031   Infiltration Assessment 0 01/29/24 0031   Dressing Status Clean, dry & intact 01/29/24 0031   Dressing Type Transparent 01/29/24 0001   Dressing Intervention New 01/29/24 0001       Peripheral IV 01/29/24 Left;Proximal;Anterior Cephalic (Active)   Site Assessment Clean, dry & intact 01/29/24 0032   Line Status Normal saline locked 01/29/24 0032   Line Care Connections checked and tightened 01/29/24 0032   Phlebitis Assessment No symptoms 01/29/24 0032   Infiltration Assessment 0 01/29/24 0032   Dressing Status Clean, dry & intact 01/29/24 0032       Ambulatory Status:  No data recorded    Diagnosis:  DISPOSITION Admitted 01/29/2024 12:24:35 AM   Final diagnoses:   None        Consults:  None     Pain Score:       C-SSRS:     VSS. Patient with no complaints of pain or nausea. Would be appropriate for transfer to room at this time but no room available. Will place in clinical discharge at this time and change assessments to every 2 hours and VS to every 1 hour.

## 2024-08-22 NOTE — H&P
Date of Surgery Update:  Estrella Whiting was seen, history and physical examination reviewed, and patient examined by me today. There have been no significant clinical changes since the completion of the previous history and physical. The surgical site was confirmed by the patient and me.     I have presented reasonable alternatives to the patient's proposed care, treatment, and services. The discussion I have done encompassed risks, benefits, and side effects related to the alternatives and the risks related to not receiving the proposed care, treatment, and services.     All questions answered. Patient wishes to proceed.     Electronically signed by: DARWIN BAEZ MD,8/22/2024,11:27 AM

## 2024-08-22 NOTE — PROGRESS NOTES
4 Eyes Skin Assessment     NAME:  Estrella Whiting  YOB: 1955  MEDICAL RECORD NUMBER:  [id number]    The patient is being assessed for  Post-Op Surgical    I agree that at least one RN has performed a thorough Head to Toe Skin Assessment on the patient. ALL assessment sites listed below have been assessed.      Areas assessed by both nurses:    Head, Face, Ears, Shoulders, Back, Chest, Arms, Elbows, Hands, Sacrum. Buttock, Coccyx, Ischium, Legs. Feet and Heels, and Under Medical Devices         Does the Patient have a Wound? No noted wound(s)       Wayne Prevention initiated by RN: No  Wound Care Orders initiated by RN: No    Pressure Injury (Stage 3,4, Unstageable, DTI, NWPT, and Complex wounds) if present, place Wound referral order by RN under : No    New Ostomies, if present place, Ostomy referral order under : No     Nurse 1 eSignature: Electronically signed by Henry Alvares RN on 8/22/24 at 5:05 PM EDT    **SHARE this note so that the co-signing nurse can place an eSignature**    Nurse 2 eSignature: Electronically signed by Yi Allen RN on 8/22/24 at 5:15 PM EDT

## 2024-08-22 NOTE — ANESTHESIA POSTPROCEDURE EVALUATION
Department of Anesthesiology  Postprocedure Note    Patient: Estrella Whiting  MRN: [id number]  YOB: 1955  Date of evaluation: 8/22/2024    Procedure Summary       Date: 08/22/24 Room / Location: [place] [address] / [facility]    Anesthesia Start: 1202 Anesthesia Stop:     Procedures:       VAGINAL VAULT SUSPENSION, ANTERIOR REPAIR, POSTERIOR REPAIR, CYSTOSCOPY      .      . Diagnosis:       Vaginal vault prolapse      Cystocele, midline      Rectocele      Vaginal enterocele      (Vaginal vault prolapse [N81.9])      (Cystocele, midline [N81.11])      (Rectocele [N81.6])      (Vaginal enterocele [N81.5])    Surgeons: John Bocanegra MD Responsible Provider: Fransisco Norwood MD    Anesthesia Type: general ASA Status: 3            Anesthesia Type: No value filed.    Fidel Phase I: Fidel Score: 10    Fidel Phase II:      Anesthesia Post Evaluation    Patient location during evaluation: PACU  Patient participation: complete - patient participated  Level of consciousness: awake  Pain score: 4  Airway patency: patent  Nausea & Vomiting: no nausea and no vomiting  Cardiovascular status: hemodynamically stable  Respiratory status: acceptable  Hydration status: euvolemic  Pain management: adequate    No notable events documented.

## 2024-08-22 NOTE — FLOWSHEET NOTE
Patient received from the OR to PACU #16 post VAGINAL VAULT SUSPENSION, POSTERIOR REPAIR, CYSTOSCOPY of Dr. Bocanegra. Placed on PACU monitoring equipment. Report given per CRNA and OR RN. Per report, patient was stable during the proceure. Had exparel for local and has band in place. On arrival, patient is arouseable, herbert and denies pain. WARM PACK TO BELLY. Accu check 103.

## 2024-08-22 NOTE — CARE COORDINATION
Case Management Assessment  Initial Evaluation    Date/Time of Evaluation: 8/22/2024 5:27 PM  Assessment Completed by: Diana Murphy RN    If patient is discharged prior to next notation, then this note serves as note for discharge by case management.    Patient Name: Estrella Whiting                   YOB: 1955  Diagnosis: Vaginal vault prolapse [N81.9]  Cystocele, midline [N81.11]  Rectocele [N81.6]  Vaginal enterocele [N81.5]  Female rectocele with enterocele [N81.6, K46.9]                   Date / Time: 8/22/2024 10:30 AM    Patient Admission Status: Observation   Readmission Risk (Low < 19, Mod (19-27), High > 27): No data recorded  Current PCP: Yi Weinstein PA  PCP verified by CM? Yes    Chart Reviewed: Yes      History Provided by: Patient  Patient Orientation: Alert and Oriented    Patient Cognition: Alert    Hospitalization in the last 30 days (Readmission):  No    If yes, Readmission Assessment in CM Navigator will be completed.    Advance Directives:      Code Status: Full Code   Patient's Primary Decision Maker is: Legal Next of Kin    Primary Decision Maker: Hanane Bush - Child - [phone]    Secondary Decision Maker: Chelly Forman - Brother/Sister - [phone]    Discharge Planning:    Patient lives with: Alone Type of Home: Apartment  Primary Care Giver: Self  Patient Support Systems include: Family Members   Current Financial resources: Medicaid, Medicare  Current community resources: None  Current services prior to admission: Durable Medical Equipment            Current DME: Walker (rollator)            Type of Home Care services:  None    ADLS  Prior functional level: Independent in ADLs/IADLs  Current functional level: Independent in ADLs/IADLs    PT AM-PAC:   /24  OT AM-PAC:   /24    Family can provide assistance at DC: Yes  Would you like Case Management to discuss the discharge plan with any other family members/significant others, and if so, who? No  Plans to Return  to Present Housing: Yes  Other Identified Issues/Barriers to RETURNING to current housing: n/a  Potential Assistance needed at discharge: N/A            Potential DME:    Patient expects to discharge to: Apartment  Plan for transportation at discharge: Self    Financial    Payor: [facility] MEDICARE / Plan: [employer] DUAL COMPLETE / Product Type: *No Product type* /     Does insurance require precert for SNF: Yes    Potential assistance Purchasing Medications: No  Meds-to-Beds request:        [facility] [address] [place] [address] - [address] [facility] - P [phone] - F [phone]  [address] [facility] [address]  Phone: [phone] Fax: [phone]      Notes:    Factors facilitating achievement of predicted outcomes: Family support, Motivated, Cooperative, and Pleasant    Barriers to discharge: Pain and fabian diet, void check in AM, ambulate    Additional Case Management Notes: Patient is from home alone but is independent with all ADL's. She will have transport to home and denies the need for home care. She ambulates with a rollator at baseline. No CM needs for d/c.     The Plan for Transition of Care is related to the following treatment goals of Vaginal vault prolapse [N81.9]  Cystocele, midline [N81.11]  Rectocele [N81.6]  Vaginal enterocele [N81.5]  Female rectocele with enterocele [N81.6, K46.9]    IF APPLICABLE: The Patient and/or patient representative Estrella and her family were provided with a choice of provider and agrees with the discharge plan. Freedom of choice list with basic dialogue that supports the patient's individualized plan of care/goals and shares the quality data associated with the providers was provided to: Patient   Patient Representative Name:       The Patient and/or Patient Representative Agree with the Discharge Plan? Yes    Diana Murphy RN  Case Management Department  Ph: [phone] Fax: [phone]

## 2024-08-23 VITALS
WEIGHT: 205.2 LBS | BODY MASS INDEX: 38.74 KG/M2 | HEART RATE: 70 BPM | SYSTOLIC BLOOD PRESSURE: 156 MMHG | OXYGEN SATURATION: 99 % | DIASTOLIC BLOOD PRESSURE: 67 MMHG | TEMPERATURE: 97.6 F | HEIGHT: 61 IN | RESPIRATION RATE: 18 BRPM

## 2024-08-23 PROBLEM — N81.11 CYSTOCELE, MIDLINE: Status: RESOLVED | Noted: 2024-07-31 | Resolved: 2024-08-23

## 2024-08-23 PROBLEM — N81.9 VAGINAL VAULT PROLAPSE: Status: RESOLVED | Noted: 2024-07-31 | Resolved: 2024-08-23

## 2024-08-23 PROBLEM — N81.6 RECTOCELE: Status: RESOLVED | Noted: 2024-07-31 | Resolved: 2024-08-23

## 2024-08-23 PROBLEM — N81.6 FEMALE RECTOCELE WITH ENTEROCELE: Status: RESOLVED | Noted: 2024-08-22 | Resolved: 2024-08-23

## 2024-08-23 PROBLEM — N81.5 VAGINAL ENTEROCELE: Status: RESOLVED | Noted: 2024-07-31 | Resolved: 2024-08-23

## 2024-08-23 PROBLEM — K46.9 FEMALE RECTOCELE WITH ENTEROCELE: Status: RESOLVED | Noted: 2024-08-22 | Resolved: 2024-08-23

## 2024-08-23 PROCEDURE — 6370000000 HC RX 637 (ALT 250 FOR IP): Performed by: OBSTETRICS & GYNECOLOGY

## 2024-08-23 PROCEDURE — 97161 PT EVAL LOW COMPLEX 20 MIN: CPT

## 2024-08-23 PROCEDURE — 97535 SELF CARE MNGMENT TRAINING: CPT

## 2024-08-23 PROCEDURE — G0378 HOSPITAL OBSERVATION PER HR: HCPCS

## 2024-08-23 PROCEDURE — 97530 THERAPEUTIC ACTIVITIES: CPT

## 2024-08-23 PROCEDURE — 2580000003 HC RX 258: Performed by: OBSTETRICS & GYNECOLOGY

## 2024-08-23 PROCEDURE — 97165 OT EVAL LOW COMPLEX 30 MIN: CPT

## 2024-08-23 PROCEDURE — 97116 GAIT TRAINING THERAPY: CPT

## 2024-08-23 RX ORDER — DOCUSATE SODIUM 100 MG/1
100 CAPSULE, LIQUID FILLED ORAL 2 TIMES DAILY
Qty: 90 CAPSULE | Refills: 0 | Status: SHIPPED | OUTPATIENT
Start: 2024-08-23 | End: 2024-10-07

## 2024-08-23 RX ORDER — DILTIAZEM HYDROCHLORIDE 360 MG/1
360 CAPSULE, EXTENDED RELEASE ORAL DAILY
Qty: 90 CAPSULE | Refills: 2 | Status: SHIPPED | OUTPATIENT
Start: 2024-08-23

## 2024-08-23 RX ORDER — POLYETHYLENE GLYCOL 3350 17 G/17G
17 POWDER ORAL DAILY
Qty: 510 G | Refills: 0 | Status: SHIPPED | OUTPATIENT
Start: 2024-08-23 | End: 2024-09-22

## 2024-08-23 RX ORDER — OXYCODONE AND ACETAMINOPHEN 5; 325 MG/1; MG/1
1 TABLET ORAL EVERY 6 HOURS PRN
Qty: 20 TABLET | Refills: 0 | Status: SHIPPED | OUTPATIENT
Start: 2024-08-23 | End: 2024-08-28

## 2024-08-23 RX ADMIN — SODIUM CHLORIDE, PRESERVATIVE FREE 10 ML: 5 INJECTION INTRAVENOUS at 09:49

## 2024-08-23 RX ADMIN — DULOXETINE HYDROCHLORIDE 60 MG: 60 CAPSULE, DELAYED RELEASE ORAL at 09:49

## 2024-08-23 RX ADMIN — SPIRONOLACTONE 25 MG: 25 TABLET ORAL at 09:49

## 2024-08-23 RX ADMIN — METFORMIN HYDROCHLORIDE 500 MG: 500 TABLET, FILM COATED ORAL at 09:49

## 2024-08-23 RX ADMIN — ACETAMINOPHEN 1000 MG: 500 TABLET ORAL at 06:03

## 2024-08-23 RX ADMIN — PANTOPRAZOLE SODIUM 40 MG: 40 TABLET, DELAYED RELEASE ORAL at 06:03

## 2024-08-23 RX ADMIN — ATORVASTATIN CALCIUM 40 MG: 40 TABLET, FILM COATED ORAL at 09:49

## 2024-08-23 RX ADMIN — FUROSEMIDE 40 MG: 40 TABLET ORAL at 09:49

## 2024-08-23 ASSESSMENT — PAIN SCALES - GENERAL: PAINLEVEL_OUTOF10: 0

## 2024-08-23 NOTE — PROGRESS NOTES
Patient ambulatory in [facility] on RA at DC to main entrance where ride is waiting.

## 2024-08-23 NOTE — PROGRESS NOTES
Patient alert and oriented.   Reporting pain on surgical site 8/10. PRN oxycodone given with benefits.  IV fluid infusing per order.  Harding in place with adequate UO.  Vaginal packing and mesh in place with minimal drainage.   Patient unsteady with occasional tremors on Extremities. Refused to walk due to weakness.   Warm pack applied.   IS and deep breathing instructed.

## 2024-08-23 NOTE — PLAN OF CARE
Problem: Chronic Conditions and Co-morbidities  Goal: Patient's chronic conditions and co-morbidity symptoms are monitored and maintained or improved  8/23/2024 1342 by Kimberly Saldivar RN  Outcome: Completed  8/23/2024 1008 by Kimberly Saldivar RN  Outcome: Progressing  8/23/2024 1006 by Kimberly Saldivar RN  Outcome: Progressing     Problem: Discharge Planning  Goal: Discharge to home or other facility with appropriate resources  8/23/2024 1342 by Kimberly Saldivar RN  Outcome: Completed  8/23/2024 1008 by Kimberly Saldivar RN  Outcome: Progressing  8/23/2024 1006 by Kimberly Saldivar RN  Outcome: Progressing     Problem: Pain  Goal: Verbalizes/displays adequate comfort level or baseline comfort level  8/23/2024 1342 by Kimberly Saldivar RN  Outcome: Completed  8/23/2024 1008 by Kimberly Saldivar RN  Outcome: Progressing  8/23/2024 1006 by Kimberly Saldivar RN  Outcome: Progressing     Problem: Safety - Adult  Goal: Free from fall injury  8/23/2024 1342 by Kimberly Saldivar RN  Outcome: Completed  8/23/2024 1008 by Kimberly Saldivar RN  Outcome: Progressing  8/23/2024 1006 by Kimberly Saldivar RN  Outcome: Progressing

## 2024-08-23 NOTE — DISCHARGE SUMMARY
Hospital Discharge Summary      Patient ID: Estrella Whiting      Patient's PCP: Yi Weinstein PA    Admit Date: 2024     Discharge Date: 2024  The patient was seen and examined on day of discharge and this discharge summary is in conjunction with any daily progress note from day of discharge.    Admitting Physician: John Bocanegra MD    Discharge Physician: Nikki Whiting, APRN - CNP     Admitted for No chief complaint on file.      Admitting Diagnosis Vaginal vault prolapse [N81.9]  Cystocele, midline [N81.11]  Rectocele [N81.6]  Vaginal enterocele [N81.5]  Female rectocele with enterocele [N81.6, K46.9]    Discharge Diagnoses:   There are no active hospital problems to display for this patient.         Hospital Course:   POD 1  Patient awake and alert  Reports good pain management with scheduled and prn meds  Vaginal packing removed  Awaiting voiding trial      Consults:     None        Disposition: home    Discharged Condition: Stable    Code Status: Full Code    Activity: no lifting, Driving, or Strenuous exercise for 2 weeks    Diet: regular diet      Wound Care: none needed    SUBJECTIVE / Interval History:   Patient awaiting breakfast.   Denies nausea  Pain is managed with prn medication  She denies complaints  Will be discharged home with her sister  All instructions reviewed    Exam:  TEMPERATURE:  Current - Temp: 98 °F (36.7 °C); Max - Temp  Av.2 °F (36.8 °C)  Min: 97.5 °F (36.4 °C)  Max: 99.3 °F (37.4 °C)  RESPIRATIONS RANGE: Resp  Av  Min: 13  Max: 19  PULSE RANGE: Pulse  Av.4  Min: 70  Max: 105  BLOOD PRESSURE RANGE:  Systolic (24hrs), Av , Min:105 , Max:157   ; Diastolic (24hrs), Av, Min:54, Max:139    PULSE OXIMETRY RANGE: SpO2  Av.5 %  Min: 93 %  Max: 99 %  24HR INTAKE/OUTPUT:    Intake/Output Summary (Last 24 hours) at 2024 0838  Last data filed at 2024 0611  Gross per 24 hour   Intake 1070 ml   Output 1400 ml   Net -330 ml      Wt

## 2024-08-23 NOTE — PLAN OF CARE
Problem: Chronic Conditions and Co-morbidities  Goal: Patient's chronic conditions and co-morbidity symptoms are monitored and maintained or improved  8/23/2024 1008 by Kimberly Saldivar RN  Outcome: Progressing  8/23/2024 1006 by Kimberly Saldivar RN  Outcome: Progressing     Problem: Discharge Planning  Goal: Discharge to home or other facility with appropriate resources  8/23/2024 1008 by Kimberly Saldivar RN  Outcome: Progressing  8/23/2024 1006 by Kimberly Saldivar RN  Outcome: Progressing     Problem: Pain  Goal: Verbalizes/displays adequate comfort level or baseline comfort level  8/23/2024 1008 by Kimberly Saldivar RN  Outcome: Progressing  8/23/2024 1006 by Kimberly Saldivar RN  Outcome: Progressing     Problem: Safety - Adult  Goal: Free from fall injury  8/23/2024 1008 by Kimberly Saldivar RN  Outcome: Progressing  8/23/2024 1006 by Kimberly Saldivar RN  Outcome: Progressing

## 2024-08-23 NOTE — CARE COORDINATION
Case Management Assessment            Discharge Note                    Date / Time of Note: 8/23/2024 10:46 AM                  Discharge Note Completed by: Diana Murphy RN    Patient Name: Estrella Whiting   YOB: 1955  Diagnosis: Vaginal vault prolapse [N81.9]  Cystocele, midline [N81.11]  Rectocele [N81.6]  Vaginal enterocele [N81.5]  Female rectocele with enterocele [N81.6, K46.9]   Date / Time: 8/22/2024 10:30 AM    Current PCP: Yi Weinstein PA  Clinic patient: No    Hospitalization in the last 30 days: No       Advance Directives:  Code Status: Full Code  [place] DNR form completed and on chart: No    Financial:  Payor: [facility] MEDICARE / Plan: [employer] DUAL COMPLETE / Product Type: *No Product type* /      Pharmacy:    [facility] [address] [place] [address] -  [phone] - F [phone]  [address] [place] [address]  Phone: [phone] Fax: [phone]      Assistance purchasing medications?: Potential Assistance Purchasing Medications: No  Assistance provided by Case Management: None at this time    Does patient want to participate in local refill/ meds to beds program?: Yes    Meds To Beds General Rules:  1. Can ONLY be done Monday- Friday between 8:30am-5pm  2. Prescription(s) must be in pharmacy by 3pm to be filled same day  3.Copy of patient's insurance/ prescription drug card and patient face sheet must be sent along with the prescription(s)  4. Cost of Rx cannot be added to hospital bill. If financial assistance is needed, please contact unit  or ;  or  CANNOT provide pharmacy voucher for patients co-pays  5. Patients can then  the prescription on their way out of the hospital at discharge, or pharmacy can deliver to the bedside if staff is available. (payment due at time of pick-up or delivery - cash, check, or card accepted)     Able to afford home medications/

## 2024-08-23 NOTE — PROGRESS NOTES
Physical Therapy  Facility/Department: [address]  Physical Therapy Initial Assessment/Treatment/Discharge    Name: Estrella Whiting  : 1955  MRN: [id number]  Date of Service: 2024    Discharge Recommendations:  24 hour supervision or assist   PT Equipment Recommendations  Equipment Needed: No      Patient Diagnosis(es): The primary encounter diagnosis was Post-op pain. Diagnoses of Vaginal vault prolapse, Cystocele, midline, Rectocele, Vaginal enterocele, Vaginal irritation, and Vaginal itching were also pertinent to this visit.  Past Medical History:  has a past medical history of CHF (congestive heart failure) (HCC), Colitis, Diabetes mellitus (HCC), Diverticulosis, Fatty liver, MVP (mitral valve prolapse), PONV (postoperative nausea and vomiting), Prolonged emergence from general anesthesia, Spastic colon, Urinary incontinence, and Wears hearing aid in left ear.  Past Surgical History:  has a past surgical history that includes Hysterectomy; Inner ear surgery; Carpal tunnel release; Appendectomy; Tonsillectomy; Breast enhancement surgery (); Upper gastrointestinal endoscopy (2017); Colonoscopy (2017); joint replacement (Right); other surgical history (2024); Upper gastrointestinal endoscopy (N/A, 2024); Colonoscopy (N/A, 2024); Upper gastrointestinal endoscopy (2024); Vagina surgery (N/A, 2024); Vagina surgery (N/A, 2024); and Cystoscopy (N/A, 2024).    Assessment  Assessment: Pt functioning close to baseline. Safe to go home upon D/C. Recommend initial 24-hr assistance (sister to provide). No further inpt PT indicated. D/C PT.  Decision Making: Low Complexity  Requires PT Follow-Up: No  Activity Tolerance  Activity Tolerance: Patient tolerated evaluation without incident    Plan  Physical Therapy Plan  General Plan: Discharge with evaluation only  Safety Devices  Type of Devices: Left in chair, Call light within reach, Chair alarm in  WNL  AROM LLE (degrees)  LLE AROM : WNL  Strength RLE  Strength RLE: WFL  Strength LLE  Strength LLE: WFL        Bed Mobility Training  Bed Mobility Training: Yes  Supine to Sit: Supervision (HOB elevated)  Balance  Sitting: Intact  Standing - Static:  (independent with RW)  Standing - Dynamic:  (SBA/SUPV with RW)  Transfer Training  Transfer Training: Yes  Sit to Stand: Supervision (from bed)  Stand to Sit: Supervision        Ambulation  Surface: Level tile  Device: Rolling Walker  Assistance: Stand by assistance;Supervision  Distance: 400 ft, 25 ft  Comments: steady with no LOB                                                              AM-PAC - Mobility    AM-PAC Basic Mobility - Inpatient   How much help is needed turning from your back to your side while in a flat bed without using bedrails?: None  How much help is needed moving from lying on your back to sitting on the side of a flat bed without using bedrails?: None  How much help is needed moving to and from a bed to a chair?: A Little  How much help is needed standing up from a chair using your arms?: A Little  How much help is needed walking in hospital room?: A Little  How much help is needed climbing 3-5 steps with a railing?: A Little  AM-[facility] Inpatient Mobility Raw Score : 20  AM-PAC Inpatient T-Scale Score : 47.67  Mobility Inpatient CMS 0-100% Score: 35.83  Mobility Inpatient CMS G-Code Modifier : CJ                   Education  Patient Education  Education Given To: Patient  Education Provided: Role of Therapy  Education Method: Verbal  Education Outcome: Verbalized understanding      Therapy Time   Individual Concurrent Group Co-treatment   Time In 0852         Time Out 0930         Minutes 38                 Josefina Gu, PT

## 2024-08-23 NOTE — PROGRESS NOTES
Occupational Therapy  Facility/Department: [address]  Occupational Therapy Initial Assessment, Treatment and Discharge    Name: Estrella Whiting  : 1955  MRN: [id number]  Date of Service: 2024    Discharge Recommendations:  24 hour supervision or assist  OT Equipment Recommendations  Equipment Needed: No  Other: pt has the recommended DME (will borrow RW from [facility])     Assessment  Performance deficits / Impairments: Decreased functional mobility ;Decreased ADL status  Assessment: Pt from home alone and is independent with ADLs, uses rollator for community  distance mobility at baseline. Pt demo slight functional decline due to pain and decreased activity tolerance following vaginal surgery. Pt demo safe ADLs and mobility with RW at SBA to supervision level. Educated pt on energy conservation and modifications to ADLs for increased safety at home. No additional OT needs anticipated, will sign off OT. Recommend initial 24hr supervision at home.  Decision Making: Low Complexity  REQUIRES OT FOLLOW-UP: No  Activity Tolerance  Activity Tolerance: Patient Tolerated treatment well     Plan  Occupational Therapy Plan  Times Per Week: d/c OT    Restrictions  Position Activity Restriction  Other position/activity restrictions: up as tolerated    Subjective  General  Chart Reviewed: Yes  Patient assessed for rehabilitation services?: Yes  Additional Pertinent Hx: Pt admitted  s/p  VAGINAL VAULT SUSPENSION, POSTERIOR REPAIR, CYSTOSCOPY of Dr. Bocanegra  PMHx: CHF, DM, R TKA  Family / Caregiver Present: No  Diagnosis: vaginal vault prolapse  Subjective  Subjective: Pt in bed, rates vaginal pain 3/10.    Social/Functional History  Social/Functional History  Lives With: Alone  Type of Home: Apartment ([facility]dependent Living at [facility])  Home Layout: One level  Home Access: Level entry  Bathroom Shower/Tub: Walk-in shower  Bathroom Toilet: Handicap height (vanity next  to)  Bathroom Equipment: Grab bars in shower, Hand-held shower, Built-in shower seat  Home Equipment: Rollator  Has the patient had two or more falls in the past year or any fall with injury in the past year?: No  ADL Assistance: Independent  Homemaking Assistance: Independent (cleaning lady every 2 weeks)  Ambulation Assistance: Independent  Transfer Assistance: Independent  Active : Yes  Additional Comments: sister will stay with pt as long as needed    Objective    Bed Mobility  Supine to Sit: Supervision (HOB elevated)    Toilet Transfers  Toilet - Technique: Ambulating  Equipment Used: Standard toilet (grab bar)  Toilet Transfer: Stand by assistance    UE Function  AROM: Within functional limits  Strength: Within functional limits    ADL  Feeding: Independent  Grooming: Supervision (wash hands, brush teeth, wash face standing at sink)  LE Dressing: Stand by assistance (doff/don socks with LE figure 4)  Toileting:  (waggoner catheter; states plan for voiding trial later today;)  Comment: Pt education provided on energy conservation and DME recommendations for increased safety and independence during ADLs/IADLs.    Activity Tolerance  Activity Tolerance: Patient tolerated evaluation without incident     Transfers  Sit to stand: Supervision (bed, chair)  Stand to sit: Supervision (chair, bed)    Functional Mobility  Equipment: Rolling walker  Level of Assist: Stand by assist  Distance: to/from bathroom, 200' hallway  Comment: slow pace, steady with walker, slight trunk flexion d/t pain    Sitting Balance: independent  Standing Balance: SBA-supervision during ADLs    Vision  Vision: Within Functional Limits  Vision Exceptions: Wears glasses for reading  Hearing  Hearing: Exceptions to WFL (deaf right ear)  Hearing Exceptions: Left hearing aid    Cognition  Overall Cognitive Status: WNL  Orientation  Overall Orientation Status: Within Normal Limits  Orientation Level: Oriented X4    Education Given To:

## 2024-08-24 LAB — LOEFFLER MB STN SPEC: NORMAL

## 2024-08-25 LAB
BACTERIA SPEC AEROBE CULT: NORMAL
BACTERIA SPEC ANAEROBE CULT: NORMAL
GRAM STN SPEC: NORMAL

## 2024-08-26 ENCOUNTER — TELEPHONE (OUTPATIENT)
Dept: FAMILY MEDICINE CLINIC | Age: 69
End: 2024-08-26

## 2024-08-26 ENCOUNTER — CARE COORDINATION (OUTPATIENT)
Dept: CASE MANAGEMENT | Age: 69
End: 2024-08-26

## 2024-08-26 DIAGNOSIS — N81.9 VAGINAL VAULT PROLAPSE: Primary | ICD-10-CM

## 2024-08-26 PROCEDURE — 1111F DSCHRG MED/CURRENT MED MERGE: CPT | Performed by: PHYSICIAN ASSISTANT

## 2024-08-26 NOTE — CARE COORDINATION
Patient Current Location:  Home: [address] [place] [address] [address]  [place] [id number]    Care Transition Nurse contacted the patient by telephone to perform post hospital discharge assessment, verified name and  as identifiers. Provided introduction to self, and explanation of the Care Transition Nurse role.     Patient: Estrella Whiting    Patient : 1955   MRN: [id number]     Reason for Admission: Vaginal vault prolapse, Cystoscopy  Discharge Date: 24    RURS: No data recorded    Last Discharge Facility       Date Complaint Diagnosis Description Type Department Provider    24  Post-op pain ... Admission (Discharged) [facility] John Mandel MD            Was this an external facility discharge? No    Additional needs identified to be addressed with provider   No needs identified             Method of communication with provider: none.    Patients top risk factors for readmission: medical condition-Vaginal vault prolapse, Cystoscopy    Interventions to address risk factors:   Education: patient instructed to continue to monitor for any worsening vaginal bleeding, any returning pain, difficulty with urination, or ongoing urgency and slight incontinence, reporting to MD immediately.  Review of patient management of conditions/medications: take al medications as prescribed.      Care Summary Note: CTN spoke with patient this afternoon for initial 24 hour discharge follow up CTN call.  Patient states she is doing well, states pain is controlled with PRN's, no reports of any fever, chills, nausea, vomiting, chest pain, SOB or cough.     Patient did not go home with waggoner catheter, states she is urinating well, but does have some slight incontinence with standing, instructed to continue to monitor, if no improvement, notify MD.  Patient has some slight vaginal bleeding, bright red, continue to monitor.     Care Transition Nurse reviewed discharge instructions, medical action plan, and red flags with

## 2024-08-26 NOTE — TELEPHONE ENCOUNTER
Care Transitions Initial Follow Up Call    Outreach made within 2 business days of discharge: Yes    Patient: Estrella Whiting Patient : 1955   MRN: [id number]  Reason for Admission: Vaginal vault prolapse surgery  Discharge Date: 24       Spoke with: Estrella, patient had a planned surgery and will be following up with the specialist.    Discharge department/facility: [facility] Interactive Patient Contact:  Was patient able to fill all prescriptions: Yes  Was patient instructed to bring all medications to the follow-up visit: Yes  Is patient taking all medications as directed in the discharge summary? Yes  Does patient understand their discharge instructions: Yes  Does patient have questions or concerns that need addressed prior to 7-14 day follow up office visit: no        Scheduled appointment with PCP within 7-14 days    Follow Up  Future Appointments   Date Time Provider Department Center   2024 12:15 PM Nikki Whiting, ERVIN - CNP [facility]   10/28/2024 11:00 AM [facility] [place] [facility] Rad   2024  9:30 AM Yi Weinstein PA [facility] DEP   2024 11:00 AM Caesar Blood MD [facility] Pru[place] LPN

## 2024-08-27 LAB
BACTERIA SPEC AEROBE CULT: NORMAL
BACTERIA SPEC ANAEROBE CULT: NORMAL
GRAM STN SPEC: NORMAL

## 2024-09-02 LAB
FUNGUS SPEC CULT: NORMAL
LOEFFLER MB STN SPEC: NORMAL

## 2024-09-03 DIAGNOSIS — F33.1 MODERATE EPISODE OF RECURRENT MAJOR DEPRESSIVE DISORDER (HCC): ICD-10-CM

## 2024-09-03 DIAGNOSIS — M79.7 FIBROMYALGIA: ICD-10-CM

## 2024-09-03 RX ORDER — DULOXETIN HYDROCHLORIDE 60 MG/1
CAPSULE, DELAYED RELEASE ORAL
Qty: 90 CAPSULE | Refills: 0 | Status: SHIPPED | OUTPATIENT
Start: 2024-09-03

## 2024-09-03 NOTE — TELEPHONE ENCOUNTER
Refill Request     CONFIRM preferred pharmacy with the patient.    If Mail Order Rx - Pend for 90 day refill.      Last Seen: Last Seen Department: 8/14/2024  Last Seen by PCP: 8/14/2024    Last Written: 6/5/2024    If no future appointment scheduled:  Review the last OV with PCP and review information for follow-up visit,  Route STAFF MESSAGE with patient name to the  Pool for scheduling with the following information:            -  Timing of next visit           -  Visit type ie Physical, OV, etc           -  Diagnoses/Reason ie. COPD, HTN - Do not use MEDICATION, Follow-up or CHECK UP - Give reason for visit      Next Appointment:   Future Appointments   Date Time Provider Department Center   9/6/2024 12:15 PM Nikki Whiting APRN - CNP [facility] UROGYN MMA   10/28/2024 11:00 AM [facility] [place] [facility]   11/4/2024  9:30 AM Yi Weinstein PA [facility]   11/6/2024 11:00 AM Caesar Blood MD [facility]       Message sent to  to schedule appt with patient?  NO      Requested Prescriptions     Pending Prescriptions Disp Refills    DULoxetine (CYMBALTA) 60 MG extended release capsule [Pharmacy Med Name: DULoxetine HCl 60 MG Oral Capsule Delayed Release Particles] 90 capsule 0     Sig: Take 1 capsule by mouth once daily

## 2024-09-04 ENCOUNTER — CARE COORDINATION (OUTPATIENT)
Dept: CASE MANAGEMENT | Age: 69
End: 2024-09-04

## 2024-09-04 NOTE — CARE COORDINATION
Patient Current Location:  Home: [address] [place] [address] [address]  [facility] [address]    Care Transition Nurse contacted the patient by telephone. Verified name and  as identifiers.    Additional needs identified to be addressed with provider   No needs identified                 Method of communication with provider: none.    Care Summary Note: CTN spoke with patient this afternoon for follow up CTN call.  Patient states she is doing a okay, states she is not having any fever, chills, nausea, vomiting, chest pain, SOB or cough.   Patient with no congestion, difficulty emptying bladder, LE edema, feeling lightheaded, dizziness, and heart palpitations.   Patient states she is having some pain in vaginal area, feels it may be do to rawness from biopsy of area on outside.  States she has a cream that was ordered, used it once and hasn't used it again.  Some slight bleeding vaginally still, but no clots, or heavy bleeding. Patient has HFU with surgeon on 2024.     Plan of care updates since last contact:  Education: patient instructed to keep area clean and dry, apply cream or ointment to area, report any worsening pain, any fevers or chills, reporting to MD immediately.  Review of patient management of conditions/medications: take all medications as prescribed, rest as needed.       Advance Care Planning:   Does patient have an Advance Directive:   Primary Decision Maker: Hanane Bush - Child - [phone]    Secondary Decision Maker: Chelly Forman - Brother/Sister - [phone] .    Medication Review:  No changes since last call.     Assessments:  Care Transitions Subsequent and Final Call    Schedule Follow Up Appointment with PCP: Declined  Subsequent and Final Calls  Do you have any ongoing symptoms?: Yes  Onset of Patient-reported symptoms: Other  Patient-reported symptoms: Pain  Have your medications changed?: No  Do you have any questions related to your medications?: No  Do you currently have any active

## 2024-09-06 ENCOUNTER — OFFICE VISIT (OUTPATIENT)
Dept: UROGYNECOLOGY | Age: 69
End: 2024-09-06

## 2024-09-06 VITALS
TEMPERATURE: 98.1 F | SYSTOLIC BLOOD PRESSURE: 115 MMHG | OXYGEN SATURATION: 96 % | DIASTOLIC BLOOD PRESSURE: 80 MMHG | RESPIRATION RATE: 16 BRPM | HEART RATE: 119 BPM

## 2024-09-06 DIAGNOSIS — Z09 POSTOP CHECK: Primary | ICD-10-CM

## 2024-09-06 NOTE — PROGRESS NOTES
2    calcium carbonate (TUMS) 500 MG chewable tablet       atorvastatin (LIPITOR) 40 MG tablet Take 1 tablet by mouth daily 90 tablet 5    albuterol sulfate HFA (PROVENTIL HFA) 108 (90 Base) MCG/ACT inhaler Inhale 2 puffs into the lungs every 4 hours as needed for Wheezing or Shortness of Breath 18 g 6    aspirin EC 81 MG EC tablet Take 1 tablet by mouth daily 90 tablet 1    Esomeprazole Magnesium (NEXIUM PO) Take 40 mg by mouth daily      polyethylene glycol (MIRALAX) 17 GM/SCOOP POWD powder Take 17 g by mouth daily (Patient not taking: Reported on 9/6/2024) 510 g 0    magnesium oxide (MAG-OX) 400 (240 Mg) MG tablet Take 1 tablet by mouth daily (Patient not taking: Reported on 8/22/2024) 30 tablet 0    Cholecalciferol 50 MCG (2000 UT) TABS Take 1 tablet by mouth daily Take 1 tablet by mouth daily. Start this medication after you finish the weekly regimen (Patient not taking: Reported on 9/6/2024) 90 tablet 2     No current facility-administered medications for this visit.     Allergies:   Allergies   Allergen Reactions    Omeprazole Diarrhea    Sertraline Other (See Comments)     Suicidal ideation requiring hospitalization  Suicidal ideation requiring hospitalization  Suicidal ideation requiring hospitalization    Sulfa Antibiotics Nausea Only and Other (See Comments)     dizziness     Social History:   Social History     Socioeconomic History    Marital status: Single     Spouse name: Not on file    Number of children: Not on file    Years of education: Not on file    Highest education level: Not on file   Occupational History    Not on file   Tobacco Use    Smoking status: Former     Current packs/day: 0.00     Average packs/day: 1 pack/day for 32.0 years (32.0 ttl pk-yrs)     Types: Cigarettes     Start date: 6/1/1989     Quit date: 6/1/2021     Years since quitting: 3.2    Smokeless tobacco: Never   Vaping Use    Vaping status: Former   Substance and Sexual Activity    Alcohol use: No    Drug use: Never

## 2024-09-09 ENCOUNTER — CARE COORDINATION (OUTPATIENT)
Dept: CASE MANAGEMENT | Age: 69
End: 2024-09-09

## 2024-09-09 LAB
FUNGUS SPEC CULT: NORMAL
LOEFFLER MB STN SPEC: NORMAL

## 2024-09-10 ENCOUNTER — OFFICE VISIT (OUTPATIENT)
Dept: UROGYNECOLOGY | Age: 69
End: 2024-09-10

## 2024-09-10 VITALS
SYSTOLIC BLOOD PRESSURE: 128 MMHG | RESPIRATION RATE: 16 BRPM | OXYGEN SATURATION: 99 % | HEART RATE: 90 BPM | DIASTOLIC BLOOD PRESSURE: 83 MMHG | TEMPERATURE: 97.8 F

## 2024-09-10 DIAGNOSIS — Z09 POSTOP CHECK: Primary | ICD-10-CM

## 2024-09-10 PROCEDURE — 99024 POSTOP FOLLOW-UP VISIT: CPT | Performed by: OBSTETRICS & GYNECOLOGY

## 2024-09-16 LAB
FUNGUS SPEC CULT: NORMAL
LOEFFLER MB STN SPEC: NORMAL

## 2024-09-18 ENCOUNTER — CARE COORDINATION (OUTPATIENT)
Dept: CASE MANAGEMENT | Age: 69
End: 2024-09-18

## 2024-09-23 LAB
FUNGUS SPEC CULT: NORMAL
LOEFFLER MB STN SPEC: NORMAL

## 2024-10-04 ENCOUNTER — OFFICE VISIT (OUTPATIENT)
Dept: UROGYNECOLOGY | Age: 69
End: 2024-10-04

## 2024-10-04 VITALS
TEMPERATURE: 97.7 F | OXYGEN SATURATION: 96 % | DIASTOLIC BLOOD PRESSURE: 82 MMHG | HEART RATE: 105 BPM | RESPIRATION RATE: 14 BRPM | SYSTOLIC BLOOD PRESSURE: 142 MMHG

## 2024-10-04 DIAGNOSIS — Z09 POSTOP CHECK: Primary | ICD-10-CM

## 2024-10-04 NOTE — PROGRESS NOTES
10/4/2024       HPI:     Name: Estrella Whiting  YOB: 1955    CC: Estrella Whiting is a 68 y.o. female who is here for a 6 week post op evaluation.  HPI: Recently underwent VAGINAL VAULT SUSPENSION, POSTERIOR REPAIR, CYSTOSCOPY on 8/22/2024.  Voiding difficulty: No  Initiating stream: No  Force stream: No  Lean forward to empty: No  Slow/intermittent stream: No  Unable to empty bladder: No  Incontinence: stress present prior to surgery  with sneeze  Urgency without incontinence: Yes   Frequency without incontinence: No   Bowel functions: normal   Pain Controlled: Yes    Past Medical History:   Past Medical History:   Diagnosis Date    CHF (congestive heart failure) (HCC)     Colitis     Diabetes mellitus (HCC)     Diverticulosis     Fatty liver     MVP (mitral valve prolapse)     dr did not see this last check    PONV (postoperative nausea and vomiting)     Prolonged emergence from general anesthesia     Spastic colon     Urinary incontinence     Wears hearing aid in left ear      Past Surgical History:   Past Surgical History:   Procedure Laterality Date    APPENDECTOMY      BREAST ENHANCEMENT SURGERY  1973    x3- all breast tissue removed then implants    CARPAL TUNNEL RELEASE      COLONOSCOPY  08/17/2017    polyps    COLONOSCOPY N/A 2/12/2024    COLONOSCOPY POLYPECTOMY SNARE/COLD BIOPSY performed by Chelsea Knowles MD at [facility] ENDOSCOPY    CYSTOSCOPY N/A 8/22/2024    . performed by John Bocanegra MD at [facility] OR    HYSTERECTOMY (CERVIX STATUS UNKNOWN)      INNER EAR SURGERY      JOINT REPLACEMENT Right     Knee    OTHER SURGICAL HISTORY  02/12/2024    EGD BIOPSY    TONSILLECTOMY      UPPER GASTROINTESTINAL ENDOSCOPY  08/17/2017    UPPER GASTROINTESTINAL ENDOSCOPY N/A 2/12/2024    EGD BIOPSY performed by Chelsea Knowles MD at [facility] ENDOSCOPY    UPPER GASTROINTESTINAL ENDOSCOPY  2/12/2024    EGD DILATION BALLOON performed by Chelsea Knowles MD at [facility] ENDOSCOPY    VAGINA SURGERY N/A

## 2024-10-16 DIAGNOSIS — E11.59 TYPE 2 DIABETES MELLITUS WITH OTHER CIRCULATORY COMPLICATION, WITHOUT LONG-TERM CURRENT USE OF INSULIN (HCC): ICD-10-CM

## 2024-10-16 RX ORDER — SEMAGLUTIDE 0.68 MG/ML
INJECTION, SOLUTION SUBCUTANEOUS
Qty: 9 ML | Refills: 0 | Status: SHIPPED | OUTPATIENT
Start: 2024-10-16

## 2024-10-16 NOTE — TELEPHONE ENCOUNTER
Refill Request     CONFIRM preferred pharmacy with the patient.    If Mail Order Rx - Pend for 90 day refill.      Last Seen: Last Seen Department: 8/14/2024  Last Seen by PCP: 8/14/2024    Last Written: 7/29/24 9 ml with no refills     If no future appointment scheduled:  Review the last OV with PCP and review information for follow-up visit,  Route STAFF MESSAGE with patient name to the  Pool for scheduling with the following information:            -  Timing of next visit           -  Visit type ie Physical, OV, etc           -  Diagnoses/Reason ie. COPD, HTN - Do not use MEDICATION, Follow-up or CHECK UP - Give reason for visit      Next Appointment:   Future Appointments   Date Time Provider Department Center   10/28/2024 11:00 AM [facility] MAIN [facility] CT [place] Rad   11/4/2024  9:30 AM Yi Weinstein PA [facility] DEP   11/6/2024 11:00 AM Caesar Blood MD [facility] PULM MMA   12/13/2024 12:00 PM Nikki Whiting APRN - CNP [facility] UROGYMEENA MMA       Message sent to  to schedule appt with patient?  No       Requested Prescriptions     Pending Prescriptions Disp Refills    OZEMPIC, 0.25 OR 0.5 MG/DOSE, 2 MG/3ML SOPN [Pharmacy Med Name: Ozempic (0.25 or 0.5 MG/DOSE) 2 MG/3ML Subcutaneous Solution Pen-injector] 9 mL 0     Sig: INJECT 0.5 MG SUBCUTANEOUSLY  ONCE WEEEKLY

## 2024-11-04 ENCOUNTER — OFFICE VISIT (OUTPATIENT)
Dept: FAMILY MEDICINE CLINIC | Age: 69
End: 2024-11-04

## 2024-11-04 VITALS
OXYGEN SATURATION: 97 % | WEIGHT: 206.6 LBS | TEMPERATURE: 97.5 F | BODY MASS INDEX: 39.01 KG/M2 | SYSTOLIC BLOOD PRESSURE: 138 MMHG | DIASTOLIC BLOOD PRESSURE: 84 MMHG | HEIGHT: 61 IN | HEART RATE: 88 BPM

## 2024-11-04 DIAGNOSIS — I25.83 CORONARY ARTERY DISEASE DUE TO LIPID RICH PLAQUE: ICD-10-CM

## 2024-11-04 DIAGNOSIS — I50.32 CHRONIC DIASTOLIC (CONGESTIVE) HEART FAILURE (HCC): ICD-10-CM

## 2024-11-04 DIAGNOSIS — M79.7 FIBROMYALGIA: ICD-10-CM

## 2024-11-04 DIAGNOSIS — I10 ESSENTIAL HYPERTENSION: ICD-10-CM

## 2024-11-04 DIAGNOSIS — E11.59 TYPE 2 DIABETES MELLITUS WITH OTHER CIRCULATORY COMPLICATION, WITHOUT LONG-TERM CURRENT USE OF INSULIN (HCC): Primary | ICD-10-CM

## 2024-11-04 DIAGNOSIS — I25.10 CORONARY ARTERY DISEASE DUE TO LIPID RICH PLAQUE: ICD-10-CM

## 2024-11-04 DIAGNOSIS — E11.59 TYPE 2 DIABETES MELLITUS WITH OTHER CIRCULATORY COMPLICATION, WITHOUT LONG-TERM CURRENT USE OF INSULIN (HCC): ICD-10-CM

## 2024-11-04 LAB — HBA1C MFR BLD: 5.8 %

## 2024-11-04 RX ORDER — SEMAGLUTIDE 1.34 MG/ML
1 INJECTION, SOLUTION SUBCUTANEOUS
Qty: 12 ML | Refills: 1 | Status: SHIPPED | OUTPATIENT
Start: 2024-11-04

## 2024-11-04 SDOH — ECONOMIC STABILITY: FOOD INSECURITY: WITHIN THE PAST 12 MONTHS, THE FOOD YOU BOUGHT JUST DIDN'T LAST AND YOU DIDN'T HAVE MONEY TO GET MORE.: NEVER TRUE

## 2024-11-04 SDOH — ECONOMIC STABILITY: FOOD INSECURITY: WITHIN THE PAST 12 MONTHS, YOU WORRIED THAT YOUR FOOD WOULD RUN OUT BEFORE YOU GOT MONEY TO BUY MORE.: NEVER TRUE

## 2024-11-04 SDOH — ECONOMIC STABILITY: INCOME INSECURITY: HOW HARD IS IT FOR YOU TO PAY FOR THE VERY BASICS LIKE FOOD, HOUSING, MEDICAL CARE, AND HEATING?: NOT HARD AT ALL

## 2024-11-04 ASSESSMENT — ENCOUNTER SYMPTOMS
SORE THROAT: 0
ABDOMINAL PAIN: 0
CONSTIPATION: 0
SHORTNESS OF BREATH: 0
NAUSEA: 0
DIARRHEA: 0
COUGH: 0
VOMITING: 0
RHINORRHEA: 0

## 2024-11-04 ASSESSMENT — PATIENT HEALTH QUESTIONNAIRE - PHQ9: DEPRESSION UNABLE TO ASSESS: PT REFUSES

## 2024-11-04 NOTE — PROGRESS NOTES
2024  Estrella Whiting (: 1955)  68 y.o.    ASSESSMENT and PLAN:  Estrella was seen today for diabetes.    Diagnoses and all orders for this visit:    Type 2 diabetes mellitus with other circulatory complication, without long-term current use of insulin (HCC)  -     POCT glycosylated hemoglobin (Hb A1C) 5.8  -     Comprehensive Metabolic Panel; Future  -     Semaglutide, 1 MG/DOSE, (OZEMPIC, 1 MG/DOSE,) 4 MG/3ML SOPN sc injection; Inject 1 mg into the skin every 7 days  - improving, denies lows, increase ozempic to 1 mg weekly.       Chronic diastolic (congestive) heart failure (HCC)  Coronary artery disease due to lipid rich plaque  Essential hypertension  -     Comprehensive Metabolic Panel; Future  - well controlled on current regimen, continue  - cardiology note reviewed, annual follow up due 2025    Fibromyalgia  - stable on cymbalta, continue.     Return in about 6 months (around 2025) for Diabetes Mellitus.    Diabetes  Pertinent negatives for hypoglycemia include no dizziness. Pertinent negatives for diabetes include no chest pain and no weakness.       6 month chronic condition follow up.     DM: last A1c 6.3, currently on metformin 500 mg daily, ozempic 0.5 mg weekly. Tolerating well without side effect. Monitoring bg regularly. On statin, no ace/arb.      CAD/HFpEF/HTN: R/LHC performed 2023 showed mild cad, elevated filling pressures. Current regimen includes lasix, spironolactone, asa, statin, dilt. Reports overall improvement since adding spironolactone. Compliant with medication. Most recent Echo 3/5/24 EF 55%, Grade 1 DD, mild LVH; no valve dz; no sig change from 6/15/22. 1 year follow up due 2025     Fibromyalgia- stable on cymbalta.      Anxiety and depression: Stable on cymabalta. Feels overwhelmed 2/2 to inability to lose weight- \" I feel like a stuffed pig, I don't want anyone to see me.\"      Follows with Dr. DILL for chronic abdominal pain and GERD. Currently  on esomeprazole-

## 2024-11-04 NOTE — PROGRESS NOTES
\"Have you been to the ER, urgent care clinic since your last visit?  Hospitalized since your last visit?\"    NO    “Have you seen or consulted any other health care providers outside our system since your last visit?”    NO      “Have you had a diabetic eye exam?”    NO     No diabetic eye exam on file

## 2024-11-05 LAB
ALBUMIN SERPL-MCNC: 4.8 G/DL (ref 3.4–5)
ALBUMIN/GLOB SERPL: 2 {RATIO} (ref 1.1–2.2)
ALP SERPL-CCNC: 127 U/L (ref 40–129)
ALT SERPL-CCNC: 22 U/L (ref 10–40)
ANION GAP SERPL CALCULATED.3IONS-SCNC: 14 MMOL/L (ref 3–16)
AST SERPL-CCNC: 18 U/L (ref 15–37)
BILIRUB SERPL-MCNC: <0.2 MG/DL (ref 0–1)
BUN SERPL-MCNC: 22 MG/DL (ref 7–20)
CALCIUM SERPL-MCNC: 10.1 MG/DL (ref 8.3–10.6)
CHLORIDE SERPL-SCNC: 100 MMOL/L (ref 99–110)
CO2 SERPL-SCNC: 25 MMOL/L (ref 21–32)
CREAT SERPL-MCNC: 0.8 MG/DL (ref 0.6–1.2)
GFR SERPLBLD CREATININE-BSD FMLA CKD-EPI: 79 ML/MIN/{1.73_M2}
GLUCOSE SERPL-MCNC: 92 MG/DL (ref 70–99)
POTASSIUM SERPL-SCNC: 4.5 MMOL/L (ref 3.5–5.1)
PROT SERPL-MCNC: 7.2 G/DL (ref 6.4–8.2)
SODIUM SERPL-SCNC: 139 MMOL/L (ref 136–145)

## 2024-11-06 ENCOUNTER — OFFICE VISIT (OUTPATIENT)
Dept: PULMONOLOGY | Age: 69
End: 2024-11-06
Payer: MEDICARE

## 2024-11-06 ENCOUNTER — TELEPHONE (OUTPATIENT)
Dept: FAMILY MEDICINE CLINIC | Age: 69
End: 2024-11-06

## 2024-11-06 VITALS
DIASTOLIC BLOOD PRESSURE: 80 MMHG | SYSTOLIC BLOOD PRESSURE: 112 MMHG | HEIGHT: 61 IN | RESPIRATION RATE: 17 BRPM | HEART RATE: 82 BPM | OXYGEN SATURATION: 95 % | WEIGHT: 205.8 LBS | BODY MASS INDEX: 38.86 KG/M2

## 2024-11-06 DIAGNOSIS — F17.200 SMOKING: Primary | ICD-10-CM

## 2024-11-06 PROCEDURE — 1159F MED LIST DOCD IN RCRD: CPT | Performed by: INTERNAL MEDICINE

## 2024-11-06 PROCEDURE — 99214 OFFICE O/P EST MOD 30 MIN: CPT | Performed by: INTERNAL MEDICINE

## 2024-11-06 PROCEDURE — 3079F DIAST BP 80-89 MM HG: CPT | Performed by: INTERNAL MEDICINE

## 2024-11-06 PROCEDURE — 3074F SYST BP LT 130 MM HG: CPT | Performed by: INTERNAL MEDICINE

## 2024-11-06 PROCEDURE — 1123F ACP DISCUSS/DSCN MKR DOCD: CPT | Performed by: INTERNAL MEDICINE

## 2024-11-06 NOTE — TELEPHONE ENCOUNTER
Submitted PA for Ozempic (1 MG/DOSE) 4MG/3ML pen-injectors  Via CMM Key: [id number] STATUS: PENDING.    Follow up done daily; if no decision with in three days we will refax.  If another three days goes by with no decision will call the insurance for status.

## 2024-11-06 NOTE — PROGRESS NOTES
polyarthritis/inflammatory joint disease.      PHYSICAL EXAMINATION:  Vitals:    11/06/24 1104   BP: 112/80   Pulse: 82   Resp: 17   SpO2: 95%       CVS S1,S2  Chest NO murmer  Abdomen no tenderness  CNS no focal deficit  Ext no edema        DATA:   PFT restrictive pattern  Normal DLCO    IMPRESSION:    1-SOB   2-Possible MIRIAN,sleep study down the road if not lost weight in 3 months   3-recurrent COVID   4-High BMI              5- Liver mass followed by GI     PLAN:      -SOB ,improved after she lost and no any worse   _ she did lose weigh ,BMI 37   _CT scan shows no nodules ,will keep screen   PFT/CT looks ok ,6 minutes walk is very good ,no hypoxia   -decline repeat sleep study or titration   _ check IgE and eosnophilic count looks normal  Will start albuterol as needed  CT scan no UIP or fibrotic changes   Decline previnar and flu   Thank you for allowing me to participate in Estrella [facility]OLBED[facility]. I will keep following with you ,should you have any concerns ,please contact us at [place] pulmonary office     Sincerely,        Caesar Blood MD  Pulmonary & Critical Care Medicine   e last 15 years

## 2024-11-07 NOTE — TELEPHONE ENCOUNTER
The medication is APPROVED THROUGH 11/06/2025.    If this requires a response please respond to the pool ( P [facility] MEDICATION PRE-AUTH).      Thank you please advise patient.

## 2024-12-04 ENCOUNTER — TELEPHONE (OUTPATIENT)
Dept: TELEMETRY | Age: 69
End: 2024-12-04

## 2024-12-04 DIAGNOSIS — F33.1 MODERATE EPISODE OF RECURRENT MAJOR DEPRESSIVE DISORDER (HCC): ICD-10-CM

## 2024-12-04 DIAGNOSIS — M79.7 FIBROMYALGIA: ICD-10-CM

## 2024-12-04 RX ORDER — DULOXETIN HYDROCHLORIDE 60 MG/1
60 CAPSULE, DELAYED RELEASE ORAL DAILY
Qty: 90 CAPSULE | Refills: 1 | Status: SHIPPED | OUTPATIENT
Start: 2024-12-04

## 2024-12-04 NOTE — TELEPHONE ENCOUNTER
Patient due for annual CT Lung Screening. Reminder letter mailed.    Scan already ordered. Central Scheduling number provided.    Josefina Weinstein RN

## 2024-12-04 NOTE — TELEPHONE ENCOUNTER
Refill Request     CONFIRM preferred pharmacy with the patient.    If Mail Order Rx - Pend for 90 day refill.      Last Seen: Last Seen Department: 11/4/2024  Last Seen by PCP: 11/4/2024    Last Written: 09/03/2024 90 cap 0 refills     If no future appointment scheduled:  Review the last OV with PCP and review information for follow-up visit,  Route STAFF MESSAGE with patient name to the  Pool for scheduling with the following information:            -  Timing of next visit           -  Visit type ie Physical, OV, etc           -  Diagnoses/Reason ie. COPD, HTN - Do not use MEDICATION, Follow-up or CHECK UP - Give reason for visit      Next Appointment:   Future Appointments   Date Time Provider Department Center   12/13/2024 12:00 PM Nikki Whiting APRN - CNP [facility] UROGYN MMA   5/7/2025  9:30 AM Yi Weinstein PA [facility] DEP   11/6/2025  9:30 AM [facility]IN [facility] [place] Rad   11/6/2025 10:00 AM Caesar Blood MD [facility] PULM MMA       Message sent to  to schedule appt with patient?  NO      Requested Prescriptions     Pending Prescriptions Disp Refills    DULoxetine (CYMBALTA) 60 MG extended release capsule 90 capsule 0     Sig: Take 1 capsule by mouth daily

## 2024-12-26 DIAGNOSIS — E11.69 TYPE 2 DIABETES MELLITUS WITH OBESITY (HCC): ICD-10-CM

## 2024-12-26 DIAGNOSIS — E66.9 TYPE 2 DIABETES MELLITUS WITH OBESITY (HCC): ICD-10-CM

## 2024-12-26 NOTE — TELEPHONE ENCOUNTER
Refill Request     CONFIRM preferred pharmacy with the patient.    If Mail Order Rx - Pend for 90 day refill.      Last Seen: Last Seen Department: 11/4/2024  Last Seen by PCP: 11/4/2024    Last Written: 7/1/24 90 with 1 refill     If no future appointment scheduled:  Review the last OV with PCP and review information for follow-up visit,  Route STAFF MESSAGE with patient name to the  Pool for scheduling with the following information:            -  Timing of next visit           -  Visit type ie Physical, OV, etc           -  Diagnoses/Reason ie. COPD, HTN - Do not use MEDICATION, Follow-up or CHECK UP - Give reason for visit      Next Appointment:   Future Appointments   Date Time Provider Department Center   5/7/2025  9:30 AM Yi Weinstein PA [facility]   11/6/2025  9:30 AM [facility] CT MAIN [facility] CT SC [place] Rad   11/6/2025 10:00 [facility]       Message sent to  to schedule appt with patient?  NO      Requested Prescriptions     Pending Prescriptions Disp Refills    metFORMIN (GLUCOPHAGE) 500 MG tablet [Pharmacy Med Name: metFORMIN HCl 500 MG Oral Tablet] 90 tablet 0     Sig: Take 1 tablet by mouth once daily with breakfast

## 2025-01-19 DIAGNOSIS — E11.59 TYPE 2 DIABETES MELLITUS WITH OTHER CIRCULATORY COMPLICATION, WITHOUT LONG-TERM CURRENT USE OF INSULIN (HCC): ICD-10-CM

## 2025-01-20 RX ORDER — SEMAGLUTIDE 1.34 MG/ML
1 INJECTION, SOLUTION SUBCUTANEOUS
Qty: 12 ML | Refills: 1 | OUTPATIENT
Start: 2025-01-20

## 2025-01-20 NOTE — TELEPHONE ENCOUNTER
Refill Request     CONFIRM preferred pharmacy with the patient.    If Mail Order Rx - Pend for 90 day refill.      Last Seen: Last Seen Department: 11/4/2024  Last Seen by PCP: 11/4/2024    Last Written: 11/4/2024 12 mL 1 refills    If no future appointment scheduled:  Review the last OV with PCP and review information for follow-up visit,  Route STAFF MESSAGE with patient name to the  Pool for scheduling with the following information:            -  Timing of next visit           -  Visit type ie Physical, OV, etc           -  Diagnoses/Reason ie. COPD, HTN - Do not use MEDICATION, Follow-up or CHECK UP - Give reason for visit      Next Appointment:   Future Appointments   Date Time Provider Department Center   5/7/2025  9:30 AM Yi Weinstein PA [facility] DEP   11/6/2025  9:30 AM [facility] [place] [facility]   11/6/2025 10:00 AM Caesar Blood MD CLERM PUL MMA       Message sent to  to schedule appt with patient?  NO      Requested Prescriptions     Pending Prescriptions Disp Refills    Semaglutide, 1 MG/DOSE, (OZEMPIC, 1 MG/DOSE,) 4 MG/3ML SOPN sc injection 12 mL 1     Sig: Inject 1 mg into the skin every 7 days

## 2025-05-13 NOTE — TELEPHONE ENCOUNTER
Patient last seen on 4/23/24. Advised to follow up 1 year. Next appointment none.       Patient needs an appt. With SMM     Lab Results   Component Value Date     11/04/2024    K 4.5 11/04/2024     11/04/2024    CO2 25 11/04/2024    BUN 22 (H) 11/04/2024    CREATININE 0.8 11/04/2024    GLUCOSE 92 11/04/2024    CALCIUM 10.1 11/04/2024    BILITOT <0.2 11/04/2024    ALKPHOS 127 11/04/2024    AST 18 11/04/2024    ALT 22 11/04/2024    LABGLOM 79 11/04/2024    GFRAA >60 05/05/2022    AGRATIO 2.0 11/04/2024    GLOB 2.7 08/23/2016

## 2025-05-19 RX ORDER — SPIRONOLACTONE 25 MG/1
25 TABLET ORAL DAILY
Qty: 90 TABLET | Refills: 0 | Status: SHIPPED | OUTPATIENT
Start: 2025-05-19

## 2025-05-19 RX ORDER — DILTIAZEM HYDROCHLORIDE 360 MG/1
360 CAPSULE, EXTENDED RELEASE ORAL DAILY
Qty: 90 CAPSULE | Refills: 1 | Status: SHIPPED | OUTPATIENT
Start: 2025-05-19

## 2025-05-19 NOTE — TELEPHONE ENCOUNTER
Last Office Visit: 4/23/25 Provider: NALLELY  **Is provider OOT? No    Next Office Visit: 10/21/25 Provider: NALLELY

## 2025-05-22 DIAGNOSIS — I50.32 CHRONIC DIASTOLIC (CONGESTIVE) HEART FAILURE (HCC): ICD-10-CM

## 2025-05-22 DIAGNOSIS — E78.2 MIXED HYPERLIPIDEMIA: ICD-10-CM

## 2025-05-22 DIAGNOSIS — I10 ESSENTIAL HYPERTENSION: ICD-10-CM

## 2025-05-22 DIAGNOSIS — Z79.899 MEDICATION MANAGEMENT: Primary | ICD-10-CM

## 2025-05-23 RX ORDER — ATORVASTATIN CALCIUM 40 MG/1
40 TABLET, FILM COATED ORAL DAILY
Qty: 90 TABLET | Refills: 0 | Status: SHIPPED | OUTPATIENT
Start: 2025-05-23

## 2025-05-23 RX ORDER — DILTIAZEM HYDROCHLORIDE 360 MG/1
360 CAPSULE, EXTENDED RELEASE ORAL DAILY
Qty: 90 CAPSULE | Refills: 0 | OUTPATIENT
Start: 2025-05-23

## 2025-05-23 NOTE — TELEPHONE ENCOUNTER
Lab Results   Component Value Date    CHOL 100 01/10/2024    TRIG 78 01/10/2024    HDL 45 01/10/2024    LDL 39 01/10/2024    VLDL 16 01/10/2024     Lab Results   Component Value Date/Time    ALKPHOS 127 11/04/2024 09:58 AM    ALT 22 11/04/2024 09:58 AM    AST 18 11/04/2024 09:58 AM    BILITOT <0.2 11/04/2024 09:58 AM     NOV 10/21/25  LOV 4/23/24

## 2025-05-29 ENCOUNTER — TELEPHONE (OUTPATIENT)
Dept: FAMILY MEDICINE CLINIC | Age: 70
End: 2025-05-29

## 2025-05-29 DIAGNOSIS — E11.69 TYPE 2 DIABETES MELLITUS WITH OBESITY (HCC): ICD-10-CM

## 2025-05-29 DIAGNOSIS — E11.59 TYPE 2 DIABETES MELLITUS WITH OTHER CIRCULATORY COMPLICATION, WITHOUT LONG-TERM CURRENT USE OF INSULIN (HCC): Primary | ICD-10-CM

## 2025-05-29 DIAGNOSIS — E66.9 TYPE 2 DIABETES MELLITUS WITH OBESITY (HCC): ICD-10-CM

## 2025-05-29 NOTE — TELEPHONE ENCOUNTER
Patient states she is going to have labs done for Dr. Gilberto Prince. She wants to know if Yi wants to have any labs added as well so she can get it all done at once. She is planning on going to have this drawn on Monday. She will be fasting for labs.

## 2025-06-01 ENCOUNTER — HOSPITAL ENCOUNTER (EMERGENCY)
Age: 70
Discharge: HOME | End: 2025-06-01
Payer: MEDICARE

## 2025-06-01 VITALS
HEIGHT: 62 IN | BODY MASS INDEX: 39.42 KG/M2 | RESPIRATION RATE: 16 BRPM | TEMPERATURE: 98 F | DIASTOLIC BLOOD PRESSURE: 56 MMHG | SYSTOLIC BLOOD PRESSURE: 136 MMHG | OXYGEN SATURATION: 100 % | HEART RATE: 88 BPM | WEIGHT: 214.2 LBS

## 2025-06-01 DIAGNOSIS — L03.313 CELLULITIS OF CHEST WALL: Primary | ICD-10-CM

## 2025-06-01 PROCEDURE — 99283 EMERGENCY DEPT VISIT LOW MDM: CPT

## 2025-06-01 PROCEDURE — 6370000000 HC RX 637 (ALT 250 FOR IP): Performed by: STUDENT IN AN ORGANIZED HEALTH CARE EDUCATION/TRAINING PROGRAM

## 2025-06-01 RX ORDER — CLINDAMYCIN HYDROCHLORIDE 150 MG/1
300 CAPSULE ORAL ONCE
Status: COMPLETED | OUTPATIENT
Start: 2025-06-01 | End: 2025-06-01

## 2025-06-01 RX ORDER — CLINDAMYCIN HYDROCHLORIDE 300 MG/1
300 CAPSULE ORAL 3 TIMES DAILY
Qty: 30 CAPSULE | Refills: 0 | Status: SHIPPED | OUTPATIENT
Start: 2025-06-01 | End: 2025-06-11

## 2025-06-01 RX ADMIN — CLINDAMYCIN HYDROCHLORIDE 300 MG: 150 CAPSULE ORAL at 13:19

## 2025-06-01 ASSESSMENT — PAIN DESCRIPTION - LOCATION: LOCATION: RIB CAGE

## 2025-06-01 ASSESSMENT — PAIN SCALES - GENERAL: PAINLEVEL_OUTOF10: 9

## 2025-06-01 ASSESSMENT — PAIN DESCRIPTION - ORIENTATION: ORIENTATION: LEFT

## 2025-06-01 ASSESSMENT — PAIN - FUNCTIONAL ASSESSMENT: PAIN_FUNCTIONAL_ASSESSMENT: 0-10

## 2025-06-01 NOTE — ED NOTES
Discharge instructions reviewed with patient and she verbalizes understanding. Patient ambulatory out of the ED without assistance.

## 2025-06-01 NOTE — DISCHARGE INSTRUCTIONS
Please take your antibiotics as prescribed.  If her symptoms worsen such as severe pain or you develop fevers or chills please return to emergency room immediately.  Follow-up with your primary care doctor for your scheduled appointment for wound check.

## 2025-06-01 NOTE — ED PROVIDER NOTES
SURGICAL HISTORY  02/12/2024    EGD BIOPSY    TONSILLECTOMY      UPPER GASTROINTESTINAL ENDOSCOPY  08/17/2017    UPPER GASTROINTESTINAL ENDOSCOPY N/A 2/12/2024    EGD BIOPSY performed by Chelsea Knowles MD at [facility] ENDOSCOPY    UPPER GASTROINTESTINAL ENDOSCOPY  2/12/2024    EGD DILATION BALLOON performed by Chelsea Knowles MD at [facility] ENDOSCOPY    VAGINA SURGERY N/A 8/22/2024    VAGINAL VAULT SUSPENSION, POSTERIOR REPAIR, CYSTOSCOPY performed by John Bocanegra MD at [facility] OR    VAGINA SURGERY N/A 8/22/2024    . performed by John Bocanegra MD at [facility] OR       CURRENTMEDICATIONS       Discharge Medication List as of 6/1/2025  1:50 PM        CONTINUE these medications which have NOT CHANGED    Details   atorvastatin (LIPITOR) 40 MG tablet Take 1 tablet by mouth once daily, Disp-90 tablet, R-0, DAWNormal      spironolactone (ALDACTONE) 25 MG tablet Take 1 tablet by mouth once daily, Disp-90 tablet, R-0Normal      dilTIAZem (CARDIZEM CD) 360 MG extended release capsule Take 1 capsule by mouth once daily, Disp-90 capsule, R-1Normal      semaglutide, 2 MG/DOSE, (OZEMPIC) 8 MG/3ML SOPN sc injection Inject 2 mg into the skin every 7 days, Disp-12 mL, R-2Normal      metFORMIN (GLUCOPHAGE) 500 MG tablet Take 1 tablet by mouth once daily with breakfast, Disp-90 tablet, R-1Normal      DULoxetine (CYMBALTA) 60 MG extended release capsule Take 1 capsule by mouth daily, Disp-90 capsule, R-1Normal      furosemide (LASIX) 40 MG tablet Take 1 tablet by mouth daily, Disp-90 tablet, R-2Normal      promethazine (PHENERGAN) 12.5 MG tablet Take 1 tablet by mouth every 8 hours as needed for Nausea, Disp-20 tablet, R-2Normal      calcium carbonate (TUMS) 500 MG chewable tablet Historical Med      Cholecalciferol 50 MCG (2000 UT) TABS Take 1 tablet by mouth daily Take 1 tablet by mouth daily. Start this medication after you finish the weekly regimen, Disp-90 tablet, R-2Normal      albuterol sulfate HFA (PROVENTIL HFA) 108 (90

## 2025-06-02 ENCOUNTER — RESULTS FOLLOW-UP (OUTPATIENT)
Dept: CARDIOLOGY | Age: 70
End: 2025-06-02

## 2025-06-02 ENCOUNTER — HOSPITAL ENCOUNTER (OUTPATIENT)
Dept: LAB | Age: 70
Discharge: HOME | End: 2025-06-02
Payer: MEDICARE

## 2025-06-02 DIAGNOSIS — E11.69 TYPE 2 DIABETES MELLITUS WITH OBESITY (HCC): ICD-10-CM

## 2025-06-02 DIAGNOSIS — I10 ESSENTIAL HYPERTENSION: ICD-10-CM

## 2025-06-02 DIAGNOSIS — E78.2 MIXED HYPERLIPIDEMIA: ICD-10-CM

## 2025-06-02 DIAGNOSIS — Z79.899 MEDICATION MANAGEMENT: ICD-10-CM

## 2025-06-02 DIAGNOSIS — E11.59 TYPE 2 DIABETES MELLITUS WITH OTHER CIRCULATORY COMPLICATION, WITHOUT LONG-TERM CURRENT USE OF INSULIN (HCC): ICD-10-CM

## 2025-06-02 DIAGNOSIS — I50.32 CHRONIC DIASTOLIC (CONGESTIVE) HEART FAILURE (HCC): ICD-10-CM

## 2025-06-02 DIAGNOSIS — E66.9 TYPE 2 DIABETES MELLITUS WITH OBESITY (HCC): ICD-10-CM

## 2025-06-02 LAB
ALBUMIN SERPL-MCNC: 4.1 G/DL (ref 3.4–5)
ALBUMIN/GLOB SERPL: 1.4 {RATIO} (ref 1.1–2.2)
ALP SERPL-CCNC: 108 U/L (ref 40–129)
ALT SERPL-CCNC: 21 U/L (ref 10–40)
ANION GAP SERPL CALCULATED.3IONS-SCNC: 12 MMOL/L (ref 3–16)
AST SERPL-CCNC: 16 U/L (ref 15–37)
BASOPHILS # BLD: 0.1 K/UL (ref 0–0.2)
BASOPHILS NFR BLD: 0.7 %
BILIRUB SERPL-MCNC: <0.2 MG/DL (ref 0–1)
BUN SERPL-MCNC: 17 MG/DL (ref 7–20)
CALCIUM SERPL-MCNC: 9.6 MG/DL (ref 8.3–10.6)
CHLORIDE SERPL-SCNC: 104 MMOL/L (ref 99–110)
CHOLEST SERPL-MCNC: 111 MG/DL (ref 0–199)
CO2 SERPL-SCNC: 24 MMOL/L (ref 21–32)
CREAT SERPL-MCNC: 1 MG/DL (ref 0.6–1.2)
DEPRECATED RDW RBC AUTO: 15 % (ref 12.4–15.4)
EOSINOPHIL # BLD: 0.3 K/UL (ref 0–0.6)
EOSINOPHIL NFR BLD: 3.8 %
EST. AVERAGE GLUCOSE BLD GHB EST-MCNC: 125.5 MG/DL
GFR SERPLBLD CREATININE-BSD FMLA CKD-EPI: 60 ML/MIN/{1.73_M2}
GLUCOSE SERPL-MCNC: 96 MG/DL (ref 70–99)
HBA1C MFR BLD: 6 %
HCT VFR BLD AUTO: 40.2 % (ref 36–48)
HDLC SERPL-MCNC: 48 MG/DL (ref 40–60)
HGB BLD-MCNC: 13.3 G/DL (ref 12–16)
LDLC SERPL CALC-MCNC: 46 MG/DL
LYMPHOCYTES # BLD: 1.6 K/UL (ref 1–5.1)
LYMPHOCYTES NFR BLD: 21.1 %
MCH RBC QN AUTO: 28.5 PG (ref 26–34)
MCHC RBC AUTO-ENTMCNC: 33.1 G/DL (ref 31–36)
MCV RBC AUTO: 86.2 FL (ref 80–100)
MONOCYTES # BLD: 0.5 K/UL (ref 0–1.3)
MONOCYTES NFR BLD: 7 %
NEUTROPHILS # BLD: 5 K/UL (ref 1.7–7.7)
NEUTROPHILS NFR BLD: 67.4 %
PLATELET # BLD AUTO: 263 K/UL (ref 135–450)
PMV BLD AUTO: 8.6 FL (ref 5–10.5)
POTASSIUM SERPL-SCNC: 4.4 MMOL/L (ref 3.5–5.1)
PROT SERPL-MCNC: 7.1 G/DL (ref 6.4–8.2)
RBC # BLD AUTO: 4.66 M/UL (ref 4–5.2)
SODIUM SERPL-SCNC: 140 MMOL/L (ref 136–145)
TRIGL SERPL-MCNC: 86 MG/DL (ref 0–150)
TSH SERPL DL<=0.005 MIU/L-ACNC: 3.73 UIU/ML (ref 0.27–4.2)
VLDLC SERPL CALC-MCNC: 17 MG/DL
WBC # BLD AUTO: 7.4 K/UL (ref 4–11)

## 2025-06-02 PROCEDURE — 36415 COLL VENOUS BLD VENIPUNCTURE: CPT

## 2025-06-02 PROCEDURE — 83036 HEMOGLOBIN GLYCOSYLATED A1C: CPT

## 2025-06-02 PROCEDURE — 85025 COMPLETE CBC W/AUTO DIFF WBC: CPT

## 2025-06-02 PROCEDURE — 80061 LIPID PANEL: CPT

## 2025-06-02 PROCEDURE — 84443 ASSAY THYROID STIM HORMONE: CPT

## 2025-06-02 PROCEDURE — 80053 COMPREHEN METABOLIC PANEL: CPT

## 2025-06-04 ENCOUNTER — OFFICE VISIT (OUTPATIENT)
Dept: FAMILY MEDICINE CLINIC | Age: 70
End: 2025-06-04
Payer: MEDICARE

## 2025-06-04 ENCOUNTER — TELEPHONE (OUTPATIENT)
Dept: WOMENS IMAGING | Age: 70
End: 2025-06-04

## 2025-06-04 VITALS
DIASTOLIC BLOOD PRESSURE: 60 MMHG | TEMPERATURE: 96.7 F | HEIGHT: 61 IN | OXYGEN SATURATION: 96 % | HEART RATE: 94 BPM | SYSTOLIC BLOOD PRESSURE: 122 MMHG | WEIGHT: 212.4 LBS | BODY MASS INDEX: 40.1 KG/M2

## 2025-06-04 DIAGNOSIS — N61.1 ABSCESS OF LEFT BREAST: ICD-10-CM

## 2025-06-04 DIAGNOSIS — I10 ESSENTIAL HYPERTENSION: ICD-10-CM

## 2025-06-04 DIAGNOSIS — I50.32 CHRONIC DIASTOLIC (CONGESTIVE) HEART FAILURE (HCC): ICD-10-CM

## 2025-06-04 DIAGNOSIS — E66.9 TYPE 2 DIABETES MELLITUS WITH OBESITY (HCC): ICD-10-CM

## 2025-06-04 DIAGNOSIS — M79.7 FIBROMYALGIA: ICD-10-CM

## 2025-06-04 DIAGNOSIS — L60.2 OVERGROWN TOENAILS: ICD-10-CM

## 2025-06-04 DIAGNOSIS — E11.69 TYPE 2 DIABETES MELLITUS WITH OBESITY (HCC): ICD-10-CM

## 2025-06-04 DIAGNOSIS — Z00.00 MEDICARE ANNUAL WELLNESS VISIT, SUBSEQUENT: Primary | ICD-10-CM

## 2025-06-04 DIAGNOSIS — J84.9 ILD (INTERSTITIAL LUNG DISEASE) (HCC): ICD-10-CM

## 2025-06-04 DIAGNOSIS — N61.0 CELLULITIS OF LEFT BREAST: ICD-10-CM

## 2025-06-04 DIAGNOSIS — F33.1 MODERATE EPISODE OF RECURRENT MAJOR DEPRESSIVE DISORDER (HCC): ICD-10-CM

## 2025-06-04 PROCEDURE — 1159F MED LIST DOCD IN RCRD: CPT | Performed by: PHYSICIAN ASSISTANT

## 2025-06-04 PROCEDURE — 3078F DIAST BP <80 MM HG: CPT | Performed by: PHYSICIAN ASSISTANT

## 2025-06-04 PROCEDURE — 1123F ACP DISCUSS/DSCN MKR DOCD: CPT | Performed by: PHYSICIAN ASSISTANT

## 2025-06-04 PROCEDURE — G0439 PPPS, SUBSEQ VISIT: HCPCS | Performed by: PHYSICIAN ASSISTANT

## 2025-06-04 PROCEDURE — 1160F RVW MEDS BY RX/DR IN RCRD: CPT | Performed by: PHYSICIAN ASSISTANT

## 2025-06-04 PROCEDURE — 3074F SYST BP LT 130 MM HG: CPT | Performed by: PHYSICIAN ASSISTANT

## 2025-06-04 PROCEDURE — 99214 OFFICE O/P EST MOD 30 MIN: CPT | Performed by: PHYSICIAN ASSISTANT

## 2025-06-04 PROCEDURE — 3044F HG A1C LEVEL LT 7.0%: CPT | Performed by: PHYSICIAN ASSISTANT

## 2025-06-04 SDOH — ECONOMIC STABILITY: FOOD INSECURITY: WITHIN THE PAST 12 MONTHS, THE FOOD YOU BOUGHT JUST DIDN'T LAST AND YOU DIDN'T HAVE MONEY TO GET MORE.: NEVER TRUE

## 2025-06-04 SDOH — ECONOMIC STABILITY: FOOD INSECURITY: WITHIN THE PAST 12 MONTHS, YOU WORRIED THAT YOUR FOOD WOULD RUN OUT BEFORE YOU GOT MONEY TO BUY MORE.: NEVER TRUE

## 2025-06-04 ASSESSMENT — PATIENT HEALTH QUESTIONNAIRE - PHQ9
7. TROUBLE CONCENTRATING ON THINGS, SUCH AS READING THE NEWSPAPER OR WATCHING TELEVISION: SEVERAL DAYS
SUM OF ALL RESPONSES TO PHQ QUESTIONS 1-9: 9
SUM OF ALL RESPONSES TO PHQ QUESTIONS 1-9: 9
3. TROUBLE FALLING OR STAYING ASLEEP: NEARLY EVERY DAY
1. LITTLE INTEREST OR PLEASURE IN DOING THINGS: NOT AT ALL
10. IF YOU CHECKED OFF ANY PROBLEMS, HOW DIFFICULT HAVE THESE PROBLEMS MADE IT FOR YOU TO DO YOUR WORK, TAKE CARE OF THINGS AT HOME, OR GET ALONG WITH OTHER PEOPLE: NOT DIFFICULT AT ALL
8. MOVING OR SPEAKING SO SLOWLY THAT OTHER PEOPLE COULD HAVE NOTICED. OR THE OPPOSITE, BEING SO FIGETY OR RESTLESS THAT YOU HAVE BEEN MOVING AROUND A LOT MORE THAN USUAL: NOT AT ALL
6. FEELING BAD ABOUT YOURSELF - OR THAT YOU ARE A FAILURE OR HAVE LET YOURSELF OR YOUR FAMILY DOWN: NOT AT ALL
5. POOR APPETITE OR OVEREATING: SEVERAL DAYS
2. FEELING DOWN, DEPRESSED OR HOPELESS: SEVERAL DAYS
SUM OF ALL RESPONSES TO PHQ QUESTIONS 1-9: 9
SUM OF ALL RESPONSES TO PHQ QUESTIONS 1-9: 9
9. THOUGHTS THAT YOU WOULD BE BETTER OFF DEAD, OR OF HURTING YOURSELF: NOT AT ALL
4. FEELING TIRED OR HAVING LITTLE ENERGY: NEARLY EVERY DAY

## 2025-06-04 NOTE — TELEPHONE ENCOUNTER
That sounds great  She said it had gotten worse since she was in the ER which was the reason for the US today. I had given her the referral just in case it didn't improve with antibiotics.   Thank you for your help!

## 2025-06-04 NOTE — PROGRESS NOTES
Medicare Annual Wellness Visit    Estrella Whiting is here for Medicare AWV, Follow-up, and Diabetes    Assessment & Plan   Medicare annual wellness visit, subsequent  - labs utd and reviewed.   Type 2 diabetes mellitus with obesity (HCC)  -     Albumin/Creatinine Ratio, Urine; Future  -     Diabetic Foot Exam  Moderate episode of recurrent major depressive disorder (HCC)  - PHQ-9 Total Score: 9 (6/4/2025 11:15 AM)  Thoughts that you would be better off dead, or of hurting yourself in some way: 0 (6/4/2025 11:15 AM)    - on cymbalta, discussed decreasing cymbalta and adding ssri. Patient declined for now. Will call with worsening sxs.   Chronic diastolic (congestive) heart failure (HCC)  - due for follow up with cardiology. Encouraged to schedule.   - recent labs reviewed.   - sxs stable on current regimen, mgmt per cardiology, nothing on exam today that would warrant change to treatment plan.   ILD (interstitial lung disease) ([facility])  - follow up scheduled with pulm, pt to obtain ct lung   Fibromyalgia  - stable, continue cymbalta.   Essential hypertension  Cellulitis of left breast  -     US BREAST LIMITED LEFT; Future  -     Isa Ramírez MD, Breast Surgery, [facility]  -     Culture, Wound (with Gram Stain)  - continue clindamycin, wound culture today, pt to change based on result. US following treatment. If no improvement, recommend follow up with breast team.   Abscess of left breast  -     Isa Ramírez MD, Breast Surgery, [facility]  -     Culture, Wound (with Gram Stain)  Overgrown toenails  -     FILOMENA - Jacklyn Murphy DPM, Podiatry, [facility]       Return in about 3 months (around 9/4/2025) for Depression.     Subjective   The following acute and/or chronic problems were also addressed today:    Recently seen in ed for ruptured breast cyst.   Still with discharge.   Very painful  Hot and red.   Discharged home with clindamycin.     6 month chronic condition follow up.

## 2025-06-04 NOTE — PROGRESS NOTES
Have you been to the ER, urgent care clinic since your last visit?  Hospitalized since your last visit?   YES - When: approximately 3 days ago.  Where and Why: [facility] ruptured in left breast .    Have you seen or consulted any other health care providers outside our system since your last visit?   NO

## 2025-06-05 LAB
CREAT UR-MCNC: 244 MG/DL (ref 28–259)
MICROALBUMIN UR DL<=1MG/L-MCNC: <1.2 MG/DL
MICROALBUMIN/CREAT UR: NORMAL MG/G (ref 0–30)

## 2025-06-05 NOTE — PATIENT INSTRUCTIONS
having a problem with your medicine.     If your doctor recommends aspirin, take the amount directed each day. Make sure you take aspirin and not another kind of pain reliever, such as acetaminophen (Tylenol).   When should you call for help?   Call 911 if you have symptoms of a heart attack. These may include:    Chest pain or pressure, or a strange feeling in the chest.     Sweating.     Shortness of breath.     Pain, pressure, or a strange feeling in the back, neck, jaw, or upper belly or in one or both shoulders or arms.     Lightheadedness or sudden weakness.     A fast or irregular heartbeat.   After you call 911, the  may tell you to chew 1 adult-strength or 2 to 4 low-dose aspirin. Wait for an ambulance. Do not try to drive yourself.  Watch closely for changes in your health, and be sure to contact your doctor if you have any problems.  Where can you learn more?  Go to https://www.[employer].net/patientEd and enter F075 to learn more about \"A Healthy Heart: Care Instructions.\"  Current as of: July 31, 2024  Content Version: 14.5  © 1817-5651 [employer].   Care instructions adapted under license by [employer]. If you have questions about a medical condition or this instruction, always ask your healthcare professional. [employer] disclaims any warranty or liability for your use of this information.    Personalized Preventive Plan for Estrella Whiting - 6/4/2025  Medicare offers a range of preventive health benefits. Some of the tests and screenings are paid in full while other may be subject to a deductible, co-insurance, and/or copay.  Some of these benefits include a comprehensive review of your medical history including lifestyle, illnesses that may run in your family, and various assessments and screenings as appropriate.  After reviewing your medical record and screening and assessments performed today your provider may have ordered immunizations, labs, imaging, and/or

## 2025-06-08 DIAGNOSIS — F33.1 MODERATE EPISODE OF RECURRENT MAJOR DEPRESSIVE DISORDER (HCC): ICD-10-CM

## 2025-06-08 DIAGNOSIS — M79.7 FIBROMYALGIA: ICD-10-CM

## 2025-06-08 LAB
BACTERIA SPEC AEROBE CULT: NORMAL
GRAM STN SPEC: NORMAL

## 2025-06-09 ENCOUNTER — RESULTS FOLLOW-UP (OUTPATIENT)
Dept: FAMILY MEDICINE CLINIC | Age: 70
End: 2025-06-09

## 2025-06-09 RX ORDER — DULOXETIN HYDROCHLORIDE 60 MG/1
60 CAPSULE, DELAYED RELEASE ORAL DAILY
Qty: 90 CAPSULE | Refills: 0 | Status: SHIPPED | OUTPATIENT
Start: 2025-06-09

## 2025-06-09 NOTE — TELEPHONE ENCOUNTER
Refill Request     CONFIRM preferred pharmacy with the patient.    If Mail Order Rx - Pend for 90 day refill.      Last Seen: Last Seen Department: 6/4/2025  Last Seen by PCP: 6/4/2025    Last Written: 12/4/24 90 with 1 refill     If no future appointment scheduled:  Review the last OV with PCP and review information for follow-up visit,  Route STAFF MESSAGE with patient name to the  Pool for scheduling with the following information:            -  Timing of next visit           -  Visit type ie Physical, OV, etc           -  Diagnoses/Reason ie. COPD, HTN - Do not use MEDICATION, Follow-up or CHECK UP - Give reason for visit      Next Appointment:   Future Appointments   Date Time Provider Department Center   9/4/2025 10:30 AM Yi Weinstein PA [facility] DEP   10/21/2025  8:45 AM Gilberto Prince MD P [facility]   11/10/2025  8:00 AM [facility] MAIN [facility] CT [facility]   11/10/2025  9:15 AM [facility]       Message sent to  to schedule appt with patient?  NO      Requested Prescriptions     Pending Prescriptions Disp Refills    DULoxetine (CYMBALTA) 60 MG extended release capsule [Pharmacy Med Name: DULoxetine HCl 60 MG Oral Capsule Delayed Release Particles] 90 capsule 0     Sig: Take 1 capsule by mouth once daily

## 2025-06-13 ENCOUNTER — PATIENT MESSAGE (OUTPATIENT)
Dept: FAMILY MEDICINE CLINIC | Age: 70
End: 2025-06-13

## 2025-06-13 DIAGNOSIS — E11.69 TYPE 2 DIABETES MELLITUS WITH OBESITY (HCC): ICD-10-CM

## 2025-06-13 DIAGNOSIS — E66.9 TYPE 2 DIABETES MELLITUS WITH OBESITY (HCC): ICD-10-CM

## 2025-06-13 DIAGNOSIS — E11.59 TYPE 2 DIABETES MELLITUS WITH OTHER CIRCULATORY COMPLICATION, WITHOUT LONG-TERM CURRENT USE OF INSULIN (HCC): Primary | ICD-10-CM

## 2025-06-13 DIAGNOSIS — E11.9 TYPE 2 DIABETES MELLITUS WITHOUT COMPLICATION, WITHOUT LONG-TERM CURRENT USE OF INSULIN (HCC): ICD-10-CM

## 2025-06-13 NOTE — TELEPHONE ENCOUNTER
Spoke with patient.  Notified Yi is out of the office currently and message was sent to covering provider.  Patient has been on 2 mg dose since January.  Notified patient 2 mg dose is the max dose on Ozempic.  Patient stated she does not check her blood sugar but her A1C was good at recent visit.  Reports it is helping with hunger and just wanted to see if it can be increased to again help better control hunger.  Advised I would check with covering provider but it may need to wait until Yi is back in the office next week.    Please advise.

## 2025-06-19 RX ORDER — GLUCOSAMINE HCL/CHONDROITIN SU 500-400 MG
CAPSULE ORAL
Qty: 100 STRIP | Refills: 0 | Status: SHIPPED | OUTPATIENT
Start: 2025-06-19

## 2025-06-19 RX ORDER — BLOOD-GLUCOSE METER
1 KIT MISCELLANEOUS DAILY
Qty: 1 KIT | Refills: 0 | Status: SHIPPED | OUTPATIENT
Start: 2025-06-19

## 2025-06-24 DIAGNOSIS — E66.9 TYPE 2 DIABETES MELLITUS WITH OBESITY (HCC): ICD-10-CM

## 2025-06-24 DIAGNOSIS — E11.69 TYPE 2 DIABETES MELLITUS WITH OBESITY (HCC): ICD-10-CM

## 2025-06-24 NOTE — TELEPHONE ENCOUNTER
Refill Request     CONFIRM preferred pharmacy with the patient.    If Mail Order Rx - Pend for 90 day refill.      Last Seen: Last Seen Department: 6/4/2025  Last Seen by PCP: Visit date not found    Last Written: 12/26/24 90 with 1 refill     If no future appointment scheduled:  Review the last OV with PCP and review information for follow-up visit,  Route STAFF MESSAGE with patient name to the  Pool for scheduling with the following information:            -  Timing of next visit           -  Visit type ie Physical, OV, etc           -  Diagnoses/Reason ie. COPD, HTN - Do not use MEDICATION, Follow-up or CHECK UP - Give reason for visit      Next Appointment:   Future Appointments   Date Time Provider Department Center   9/4/2025 10:30 AM Yi Weinstein PA [facility]   10/21/2025  8:45 AM Gilberto Prince MD P [facility] CAR [facility]   11/10/2025  8:00 AM [facility] MAIN [facility] [place] [facility]   11/10/2025  9:15 [facility]       Message sent to  to schedule appt with patient?  NO      Requested Prescriptions     Pending Prescriptions Disp Refills    metFORMIN (GLUCOPHAGE) 500 MG tablet [Pharmacy Med Name: metFORMIN HCl 500 MG Oral Tablet] 90 tablet 0     Sig: Take 1 tablet by mouth once daily with breakfast

## 2025-07-11 NOTE — PATIENT INSTRUCTIONS
Personalized Preventive Plan for Rubén Childress - 11/28/2022  Medicare offers a range of preventive health benefits. Some of the tests and screenings are paid in full while other may be subject to a deductible, co-insurance, and/or copay. Some of these benefits include a comprehensive review of your medical history including lifestyle, illnesses that may run in your family, and various assessments and screenings as appropriate. After reviewing your medical record and screening and assessments performed today your provider may have ordered immunizations, labs, imaging, and/or referrals for you. A list of these orders (if applicable) as well as your Preventive Care list are included within your After Visit Summary for your review. Other Preventive Recommendations:    A preventive eye exam performed by an eye specialist is recommended every 1-2 years to screen for glaucoma; cataracts, macular degeneration, and other eye disorders. A preventive dental visit is recommended every 6 months. Try to get at least 150 minutes of exercise per week or 10,000 steps per day on a pedometer . Order or download the FREE \"Exercise & Physical Activity: Your Everyday Guide\" from [employer]. Call 4-835.709.4370 or search [employer] online. You need 3011-8753 mg of calcium and 9030-0894 IU of vitamin D per day. It is possible to meet your calcium requirement with diet alone, but a vitamin D supplement is usually necessary to meet this goal.  When exposed to the sun, use a sunscreen that protects against both UVA and UVB radiation with an SPF of 30 or greater. Reapply every 2 to 3 hours or after sweating, drying off with a towel, or swimming. Always wear a seat belt when traveling in a car. Always wear a helmet when riding a bicycle or motorcycle.
How Severe Are Your Spot(S)?: moderate
What Type Of Note Output Would You Prefer (Optional)?: Standard Output
What Is The Reason For Today's Visit?: Full Body Skin Examination
What Is The Reason For Today's Visit? (Being Monitored For X): the development of new lesions
Additional History: Has history of excessive sun exposure.

## 2025-07-15 DIAGNOSIS — E11.59 TYPE 2 DIABETES MELLITUS WITH OTHER CIRCULATORY COMPLICATION, WITHOUT LONG-TERM CURRENT USE OF INSULIN (HCC): ICD-10-CM

## 2025-07-15 NOTE — TELEPHONE ENCOUNTER
Sent to the pharmacy on 6/19/25   Shouldn't need another kit   Called patient she said she just needs the lancets. Pended

## 2025-07-15 NOTE — TELEPHONE ENCOUNTER
HPI     Diabetic Eye Exam     Additional comments: 3 mo f/u           Comments    VA stable ou, no pain ou and denies floaters or flashes ou.    Cosopt bid ou          Last edited by Rosi Monroy on 7/15/2025  2:03 PM.                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 PDR OU  T2 uncontrolled without insulin  1/25 - NVD and NVE on FA   S/p PRP OU      2. DME OU  Central OS  s/p Avastin OS x 7  S/p Ozurdex OD x 2 @ 12/22, OS x 6  @ 9/24  S/p Iluvien OU 12/22    Mild steroid response - continue Cosopt BID OU  SLT with Dr. Power 12/23    10/23 - increase OS, but parafoveal and ASx  7/24 - again small increase in central ME OS with decline in Va    DME OS improved, but persistent  Obs today - get approval for Iluvien #2 in near future  1/25 - slight decrease in paracentral DME OS    7/25 - may need iluvien #2 OS soon - continue obs today    3. HTN Ret OU  BS/BP/chol control    4. PCIOL OU  Small PCO OD        3 months  OCT no dilate   Patient would like to know if she could have a referral for help with her pelvic floor/pelvic prolapse; unsure if an OBGYN is the right place to go for the issue.

## 2025-07-16 RX ORDER — AVOBENZONE, HOMOSALATE, OCTISALATE, OCTOCRYLENE 30; 40; 45; 26 MG/ML; MG/ML; MG/ML; MG/ML
1 CREAM TOPICAL DAILY
Qty: 100 EACH | Refills: 3 | Status: SHIPPED | OUTPATIENT
Start: 2025-07-16

## 2025-07-16 RX ORDER — BLOOD-GLUCOSE METER
EACH MISCELLANEOUS
Refills: 0 | OUTPATIENT
Start: 2025-07-16

## 2025-07-18 DIAGNOSIS — R11.0 NAUSEA: ICD-10-CM

## 2025-07-21 RX ORDER — PROMETHAZINE HYDROCHLORIDE 12.5 MG/1
12.5 TABLET ORAL EVERY 8 HOURS PRN
Qty: 20 TABLET | Refills: 0 | Status: SHIPPED | OUTPATIENT
Start: 2025-07-21

## 2025-07-21 NOTE — TELEPHONE ENCOUNTER
Refill Request     CONFIRM preferred pharmacy with the patient.    If Mail Order Rx - Pend for 90 day refill.      Last Seen: Last Seen Department: 6/4/2025  Last Seen by PCP: 6/4/2025    Last Written: 6/7/24 20 WITH 2 REFILLS     If no future appointment scheduled:  Review the last OV with PCP and review information for follow-up visit,  Route STAFF MESSAGE with patient name to the  Pool for scheduling with the following information:            -  Timing of next visit           -  Visit type ie Physical, OV, etc           -  Diagnoses/Reason ie. COPD, HTN - Do not use MEDICATION, Follow-up or CHECK UP - Give reason for visit      Next Appointment:   Future Appointments   Date Time Provider Department Center   9/4/2025 10:30 AM Yi Weinstein PA [facility] DEP   10/21/2025  8:45 AM Gilberto Prince MD P [facility] CAR MMA   11/10/2025  8:00 AM [facility] [place] [facility] [place] [facility]   11/10/2025  9:15 [facility]       Message sent to  to schedule appt with patient?  NO      Requested Prescriptions     Pending Prescriptions Disp Refills    promethazine (PHENERGAN) 12.5 MG tablet [Pharmacy Med Name: Promethazine HCl 12.5 MG Oral Tablet] 20 tablet 0     Sig: TAKE 1 TABLET BY MOUTH EVERY 8 HOURS AS NEEDED FOR NAUSEA

## 2025-08-19 RX ORDER — ATORVASTATIN CALCIUM 40 MG/1
40 TABLET, FILM COATED ORAL DAILY
Qty: 90 TABLET | Refills: 0 | Status: SHIPPED | OUTPATIENT
Start: 2025-08-19

## 2025-08-19 RX ORDER — SPIRONOLACTONE 25 MG/1
25 TABLET ORAL DAILY
Qty: 90 TABLET | Refills: 0 | Status: SHIPPED | OUTPATIENT
Start: 2025-08-19

## 2025-09-04 ENCOUNTER — OFFICE VISIT (OUTPATIENT)
Dept: FAMILY MEDICINE CLINIC | Age: 70
End: 2025-09-04
Payer: MEDICARE

## 2025-09-04 VITALS
TEMPERATURE: 97.4 F | HEART RATE: 91 BPM | SYSTOLIC BLOOD PRESSURE: 118 MMHG | BODY MASS INDEX: 39.23 KG/M2 | DIASTOLIC BLOOD PRESSURE: 74 MMHG | OXYGEN SATURATION: 98 % | HEIGHT: 61 IN | WEIGHT: 207.8 LBS

## 2025-09-04 DIAGNOSIS — I25.10 CORONARY ARTERY DISEASE DUE TO LIPID RICH PLAQUE: ICD-10-CM

## 2025-09-04 DIAGNOSIS — I10 ESSENTIAL HYPERTENSION: ICD-10-CM

## 2025-09-04 DIAGNOSIS — I50.32 CHRONIC DIASTOLIC (CONGESTIVE) HEART FAILURE (HCC): ICD-10-CM

## 2025-09-04 DIAGNOSIS — F33.1 MODERATE EPISODE OF RECURRENT MAJOR DEPRESSIVE DISORDER (HCC): ICD-10-CM

## 2025-09-04 DIAGNOSIS — I25.83 CORONARY ARTERY DISEASE DUE TO LIPID RICH PLAQUE: ICD-10-CM

## 2025-09-04 DIAGNOSIS — E11.59 TYPE 2 DIABETES MELLITUS WITH OTHER CIRCULATORY COMPLICATION, WITHOUT LONG-TERM CURRENT USE OF INSULIN (HCC): Primary | ICD-10-CM

## 2025-09-04 LAB — HBA1C MFR BLD: 5.8 %

## 2025-09-04 PROCEDURE — 1123F ACP DISCUSS/DSCN MKR DOCD: CPT | Performed by: PHYSICIAN ASSISTANT

## 2025-09-04 PROCEDURE — 3044F HG A1C LEVEL LT 7.0%: CPT | Performed by: PHYSICIAN ASSISTANT

## 2025-09-04 PROCEDURE — 1159F MED LIST DOCD IN RCRD: CPT | Performed by: PHYSICIAN ASSISTANT

## 2025-09-04 PROCEDURE — 3074F SYST BP LT 130 MM HG: CPT | Performed by: PHYSICIAN ASSISTANT

## 2025-09-04 PROCEDURE — 3078F DIAST BP <80 MM HG: CPT | Performed by: PHYSICIAN ASSISTANT

## 2025-09-04 PROCEDURE — 99214 OFFICE O/P EST MOD 30 MIN: CPT | Performed by: PHYSICIAN ASSISTANT

## 2025-09-04 PROCEDURE — 83036 HEMOGLOBIN GLYCOSYLATED A1C: CPT | Performed by: PHYSICIAN ASSISTANT

## 2025-09-04 RX ORDER — CITALOPRAM HYDROBROMIDE 10 MG/1
10 TABLET ORAL DAILY
Qty: 90 TABLET | Refills: 1 | Status: SHIPPED | OUTPATIENT
Start: 2025-09-04

## 2025-09-04 SDOH — ECONOMIC STABILITY: FOOD INSECURITY: WITHIN THE PAST 12 MONTHS, YOU WORRIED THAT YOUR FOOD WOULD RUN OUT BEFORE YOU GOT MONEY TO BUY MORE.: NEVER TRUE

## 2025-09-04 SDOH — ECONOMIC STABILITY: FOOD INSECURITY: WITHIN THE PAST 12 MONTHS, THE FOOD YOU BOUGHT JUST DIDN'T LAST AND YOU DIDN'T HAVE MONEY TO GET MORE.: NEVER TRUE

## 2025-09-04 ASSESSMENT — PATIENT HEALTH QUESTIONNAIRE - PHQ9
SUM OF ALL RESPONSES TO PHQ QUESTIONS 1-9: 14
4. FEELING TIRED OR HAVING LITTLE ENERGY: NEARLY EVERY DAY
10. IF YOU CHECKED OFF ANY PROBLEMS, HOW DIFFICULT HAVE THESE PROBLEMS MADE IT FOR YOU TO DO YOUR WORK, TAKE CARE OF THINGS AT HOME, OR GET ALONG WITH OTHER PEOPLE: VERY DIFFICULT
1. LITTLE INTEREST OR PLEASURE IN DOING THINGS: NEARLY EVERY DAY
SUM OF ALL RESPONSES TO PHQ QUESTIONS 1-9: 14
6. FEELING BAD ABOUT YOURSELF - OR THAT YOU ARE A FAILURE OR HAVE LET YOURSELF OR YOUR FAMILY DOWN: SEVERAL DAYS
8. MOVING OR SPEAKING SO SLOWLY THAT OTHER PEOPLE COULD HAVE NOTICED. OR THE OPPOSITE, BEING SO FIGETY OR RESTLESS THAT YOU HAVE BEEN MOVING AROUND A LOT MORE THAN USUAL: NOT AT ALL
SUM OF ALL RESPONSES TO PHQ QUESTIONS 1-9: 14
SUM OF ALL RESPONSES TO PHQ QUESTIONS 1-9: 14
5. POOR APPETITE OR OVEREATING: NOT AT ALL
9. THOUGHTS THAT YOU WOULD BE BETTER OFF DEAD, OR OF HURTING YOURSELF: NOT AT ALL
2. FEELING DOWN, DEPRESSED OR HOPELESS: NEARLY EVERY DAY
7. TROUBLE CONCENTRATING ON THINGS, SUCH AS READING THE NEWSPAPER OR WATCHING TELEVISION: SEVERAL DAYS
3. TROUBLE FALLING OR STAYING ASLEEP: NEARLY EVERY DAY

## 2025-09-04 ASSESSMENT — ENCOUNTER SYMPTOMS
NAUSEA: 0
SORE THROAT: 0
RHINORRHEA: 0
COUGH: 0
SHORTNESS OF BREATH: 0
VOMITING: 0
DIARRHEA: 0
ABDOMINAL PAIN: 0
CONSTIPATION: 0

## (undated) DEVICE — SOLUTION IRRIG 1000ML 0.9% SOD CHL USP POUR PLAS BTL

## (undated) DEVICE — SOLUTION IRRIG 1000ML STRL H2O USP PLAS POUR BTL

## (undated) DEVICE — SUTURE NDL MAYO CATGUT 824S5

## (undated) DEVICE — TOTAL TRAY, DB, 100% SILI FOLEY, 16FR 10: Brand: MEDLINE

## (undated) DEVICE — SUTURE VICRYL + SZ 3-0 L27IN ABSRB UD L26MM SH 1/2 CIR VCP416H

## (undated) DEVICE — GLOVE SURG SZ 7.5 L11.73IN FNGR THK9.8MIL STRW LTX POLYMER

## (undated) DEVICE — GAUZE,PACKING STRIP,IODOFORM,2"X5YD,STRL: Brand: CURAD

## (undated) DEVICE — SOLUTION SCRB 4OZ 7.5% POVIDONE IOD ANTIMIC BTL

## (undated) DEVICE — BAG,DRAINAGE,UROLOGY,2000ML,ANTI REFLUX: Brand: MEDLINE

## (undated) DEVICE — CONMED SCOPE SAVER BITE BLOCK, 20X27 MM: Brand: SCOPE SAVER

## (undated) DEVICE — YANKAUER,BULB TIP,W/O VENT,RIGID,STERILE: Brand: MEDLINE

## (undated) DEVICE — Device

## (undated) DEVICE — STRAP RESTRN W3.5XL18IN STD ALL PURP DISP W/ SLNG RNG

## (undated) DEVICE — SEALER ENDOSCP NANO COAT OPN DIV CRV L JAW LIGASURE IMPACT

## (undated) DEVICE — CANNULA,OXY,ADULT,SUPERSOFT,W/7'TUB,SC: Brand: MEDLINE INDUSTRIES, INC.

## (undated) DEVICE — ELECTRODE ELECSURG L 10.2 CM PTFE COAT MONOPOLAR BLADE OPN

## (undated) DEVICE — GLOVE SURG SZ 85 L12IN FNGR ORTHO 126MIL CRM LTX FREE

## (undated) DEVICE — PREMIUM WET SKIN PREP TRAY: Brand: MEDLINE INDUSTRIES, INC.

## (undated) DEVICE — SPONGE,LAP,18"X18",DLX,XR,ST,5/PK,40/PK: Brand: MEDLINE

## (undated) DEVICE — SYRINGE MED 10ML LUERLOCK TIP W/O SFTY DISP

## (undated) DEVICE — CYSTOSCOPY: Brand: MEDLINE INDUSTRIES, INC.

## (undated) DEVICE — ENDOSCOPIC KIT 2 12 FT OP4 DE2 GWN SYR

## (undated) DEVICE — SURG GL, SENSICARE PI ORTHO LT,LF,PF,8.5: Brand: MEDLINE

## (undated) DEVICE — HOOK RETRCT L5MM E SHRP SELF RET SYS LONE STAR

## (undated) DEVICE — ELECTRODE,RADIOTRANSLUCENT,FOAM,3PK: Brand: MEDLINE

## (undated) DEVICE — TRAP FLUID

## (undated) DEVICE — SYRINGE,CONTROL,LL,FINGER,GRIP: Brand: MEDLINE INDUSTRIES, INC.

## (undated) DEVICE — ENDO CARRY-ON PROCEDURE KIT INCLUDES SUCTION TUBING, LUBRICANT, GAUZE, BIOHAZARD STICKER, TRANSPORT PAD AND INTERCEPT BEDSIDE KIT.: Brand: ENDO CARRY-ON PROCEDURE KIT

## (undated) DEVICE — NEPTUNE E-SEP SMOKE EVACUATION PENCIL, COATED, 70MM BLADE, PUSH BUTTON SWITCH: Brand: NEPTUNE E-SEP

## (undated) DEVICE — NEEDLE,22GX1.5",REG,BEVEL: Brand: MEDLINE

## (undated) DEVICE — SUTURE VICRYL + SZ 2-0 L18IN ABSRB VLT L26MM SH 1/2 CIR VCP775D

## (undated) DEVICE — 1LYRTR 16FR10ML100%SIL UMS SNP: Brand: MEDLINE INDUSTRIES, INC.

## (undated) DEVICE — SPONGE,LAP,4"X18",XR,ST,5/PK,40PK/CS: Brand: MEDLINE INDUSTRIES, INC.

## (undated) DEVICE — SUTURE ETHIBOND EXCEL 2-0 L30IN NONABSORBABLE GRN L26MM SH MX563

## (undated) DEVICE — CYSTO/BLADDER IRRIGATION SET, REGULATING CLAMP

## (undated) DEVICE — SYRINGE INFL 60ML DISP ALLIANCE II

## (undated) DEVICE — SUTURE ETHIBOND EXCEL SZ 2-0 L36IN NONABSORBABLE GRN SH-2 X559H

## (undated) DEVICE — SNARE ENDOSCP L240CM SHTH DIA24MM LOOP W10MM POLYP RND REINF

## (undated) DEVICE — SUTURE VICRYL + SZ 0 L18IN ABSRB VLT L26MM CT-2 1/2 CIR VCP727D

## (undated) DEVICE — SYRINGE MED 20ML STD CLR PLAS LUERLOCK TIP N CTRL DISP

## (undated) DEVICE — ESOPHAGEAL/COLONIC/BILIARY WIREGUIDED BALLOON DILATATION CATHETER: Brand: CRE™ PRO

## (undated) DEVICE — SOLUTION IRRIG 3000ML STRL H2O USP UROMATIC PLAS CONT

## (undated) DEVICE — UNDERPANTS INCONT XL 45-70IN KNIT SEAMLESS DSGN COLOR-CODED

## (undated) DEVICE — PACK,LAPARASCOPY,PELVISCOPY,AURORA: Brand: MEDLINE

## (undated) DEVICE — FORCEP BX STD CAP 240CM RAD JAW 4

## (undated) DEVICE — SUTURE VICRYL + SZ 2-0 L27IN ABSRB UD CT-2 L26MM 1/2 CIR TAPR VCP269H

## (undated) DEVICE — RETRACTOR SURG W18.3XL32.5CM PLAS SELF RET W/ 2 CATH CLP

## (undated) DEVICE — GENERAL: Brand: MEDLINE INDUSTRIES, INC.

## (undated) DEVICE — SUTURE PDS + SZ 2 0 L27IN ABSRB VLT L26MM CT 2 1 2 CIR PDP333H